# Patient Record
Sex: MALE | Race: ASIAN
[De-identification: names, ages, dates, MRNs, and addresses within clinical notes are randomized per-mention and may not be internally consistent; named-entity substitution may affect disease eponyms.]

---

## 2018-07-21 ENCOUNTER — HOSPITAL ENCOUNTER (INPATIENT)
Dept: HOSPITAL 36 - ER | Age: 68
LOS: 8 days | Discharge: NURSING, REHAB OR LONG-TERM CARE | DRG: 377 | End: 2018-07-29
Payer: MEDICARE

## 2018-07-21 DIAGNOSIS — N40.0: ICD-10-CM

## 2018-07-21 DIAGNOSIS — Z87.891: ICD-10-CM

## 2018-07-21 DIAGNOSIS — I25.2: ICD-10-CM

## 2018-07-21 DIAGNOSIS — J44.0: ICD-10-CM

## 2018-07-21 DIAGNOSIS — K92.2: Primary | ICD-10-CM

## 2018-07-21 DIAGNOSIS — J18.9: ICD-10-CM

## 2018-07-21 DIAGNOSIS — Z91.14: ICD-10-CM

## 2018-07-21 DIAGNOSIS — G89.4: ICD-10-CM

## 2018-07-21 DIAGNOSIS — F25.0: ICD-10-CM

## 2018-07-21 DIAGNOSIS — I25.10: ICD-10-CM

## 2018-07-21 DIAGNOSIS — F23: ICD-10-CM

## 2018-07-21 DIAGNOSIS — F03.90: ICD-10-CM

## 2018-07-21 DIAGNOSIS — G93.41: ICD-10-CM

## 2018-07-21 DIAGNOSIS — I48.91: ICD-10-CM

## 2018-07-21 DIAGNOSIS — N39.0: ICD-10-CM

## 2018-07-21 DIAGNOSIS — Z86.73: ICD-10-CM

## 2018-07-21 LAB
ALBUMIN SERPL-MCNC: 5 GM/DL (ref 4.2–5.5)
ALBUMIN/GLOB SERPL: 1.3 {RATIO} (ref 1–1.8)
ALP SERPL-CCNC: 109 U/L (ref 34–104)
ALT SERPL-CCNC: 20 U/L (ref 7–52)
AMYLASE SERPL-CCNC: 63 U/L (ref 29–103)
ANION GAP SERPL CALC-SCNC: 15.3 MMOL/L (ref 7–16)
AST SERPL-CCNC: 27 U/L (ref 13–39)
BASOPHILS # BLD AUTO: 0.1 TH/CUMM (ref 0–0.2)
BASOPHILS NFR BLD AUTO: 0.9 % (ref 0–2)
BILIRUB SERPL-MCNC: 0.5 MG/DL (ref 0.3–1)
BUN SERPL-MCNC: 19 MG/DL (ref 7–25)
CALCIUM SERPL-MCNC: 10.8 MG/DL (ref 8.6–10.3)
CHLORIDE SERPL-SCNC: 92 MEQ/L (ref 98–107)
CO2 SERPL-SCNC: 31.2 MEQ/L (ref 21–31)
CREAT SERPL-MCNC: 0.9 MG/DL (ref 0.7–1.3)
EOSINOPHIL # BLD AUTO: 0 TH/CMM (ref 0.1–0.4)
EOSINOPHIL NFR BLD AUTO: 0.4 % (ref 0–5)
ERYTHROCYTE [DISTWIDTH] IN BLOOD BY AUTOMATED COUNT: 14.9 % (ref 11.5–20)
GLOBULIN SER-MCNC: 3.8 GM/DL
GLUCOSE SERPL-MCNC: 119 MG/DL (ref 70–105)
HCT VFR BLD CALC: 44.8 % (ref 41–60)
HGB BLD-MCNC: 15.2 GM/DL (ref 12–16)
LIPASE SERPL-CCNC: 42 U/L (ref 11–82)
LYMPHOCYTE AB SER FC-ACNC: 1.3 TH/CMM (ref 1.5–3)
LYMPHOCYTES NFR BLD AUTO: 11.9 % (ref 20–50)
MAGNESIUM SERPL-MCNC: 2.4 MG/DL (ref 1.9–2.7)
MCH RBC QN AUTO: 31.3 PG (ref 27–31)
MCHC RBC AUTO-ENTMCNC: 33.8 PG (ref 28–36)
MCV RBC AUTO: 92.7 FL (ref 80–99)
MONOCYTES # BLD AUTO: 0.9 TH/CMM (ref 0.3–1)
MONOCYTES NFR BLD AUTO: 8.5 % (ref 2–10)
NEUTROPHILS # BLD: 8.7 TH/CMM (ref 1.8–8)
NEUTROPHILS NFR BLD AUTO: 78.3 % (ref 40–80)
PHOSPHATE SERPL-MCNC: 3.8 MG/DL (ref 2.5–5)
PLATELET # BLD: 315 TH/CMM (ref 150–400)
PMV BLD AUTO: 7 FL
POTASSIUM SERPL-SCNC: 3.5 MEQ/L (ref 3.5–5.1)
RBC # BLD AUTO: 4.84 MIL/CMM (ref 3.8–5.8)
SODIUM SERPL-SCNC: 135 MEQ/L (ref 136–145)
WBC # BLD AUTO: 11 TH/CMM (ref 4.8–10.8)

## 2018-07-21 PROCEDURE — Z7610: HCPCS

## 2018-07-21 PROCEDURE — C9113 INJ PANTOPRAZOLE SODIUM, VIA: HCPCS

## 2018-07-21 RX ADMIN — POTASSIUM CHLORIDE SCH MLS/HR: 2 INJECTION, SOLUTION, CONCENTRATE INTRAVENOUS at 09:50

## 2018-07-21 RX ADMIN — DILTIAZEM HYDROCHLORIDE SCH MG: 30 TABLET, FILM COATED ORAL at 09:51

## 2018-07-21 RX ADMIN — DILTIAZEM HYDROCHLORIDE SCH: 30 TABLET, FILM COATED ORAL at 10:55

## 2018-07-21 RX ADMIN — POTASSIUM CHLORIDE SCH MLS/HR: 2 INJECTION, SOLUTION, CONCENTRATE INTRAVENOUS at 23:33

## 2018-07-21 NOTE — ED PHYSICIAN CHART
ED Chief Complaint/HPI





- Patient Information


Date Seen:: 07/21/18


Time Seen:: 02:20


Chief Complaint:: coffee ground emesis


History of Present Illness:: 





coffee ground emesis


Allergies:: 


 Allergies











Allergy/AdvReac Type Severity Reaction Status Date / Time


 


No Known Allergies Allergy   Verified 07/21/18 02:32











Vitals:: 


 Vital Signs - 8 hr











  07/21/18





  02:20


 


Temp 98.4 F


 


HR 88


 


RR 20


 


/76


 


O2 Sat % 95














Family Medical History





- Family Member


  ** Mother


History Unknown: Yes





ED Physical Exam





- Physical Examination


General/Constitutional: Awake


Other Gen/Cons comments:: 


agitated, fighting and swearing.


Head: Atraumatic


Eyes: Lids, conjuctiva normal, PERRL, EOMI


Skin: Nl inspection, No rash, No skin lesions, No ecchymosis, Well hydrated, No 

lymphadenopathy


ENMT: External ears, nose nl, Nasal exam nl, Lips, teeth, gums nl


Neck: Nontender, Full ROM w/o pain, No JVD, No nuchal rigidity, No bruit, No 

mass, No stridor


Respiratory: Nl effort/Exclusion, Clear to Auscultation, No Wheeze/Rhonchi/Rales


Cardio Vascular: No murmur, gallop, rubs, NL S1 S2


Other Cardio Vascular comments:: 


tachycardic


GI: No tenderness/rebounding/guarding, No organomegaly, No hernia, Normal BS's, 

Nondistended, No mass/bruits, No McBurney tenderness


Other GI comments:: 


midline laparotomy scar.


: No CVA tenderness


Extremities: No tenderness or effusion, Full ROM, normal strength in all 

extremities, No edema, Normal digits & nails


Other Extremities comments:: 


right hip scar


Neuro/Psych: Normal motor strength


Other Neuro/Psych comments:: 


agitated, swearing and fighting.


Misc: Normal back, No paraspinal tenderness





ED Labs/Radiology/EKG Results





- Lab Results


Results: 


 Laboratory Tests











  07/21/18 07/21/18 07/21/18





  03:30 03:30 03:30


 


WBC  11.0 H  


 


RBC  4.84  


 


Hgb  15.2  


 


Hct  44.8  


 


MCV  92.7  


 


MCH  31.3 H  


 


MCHC Differential  33.8  


 


RDW  14.9  


 


Plt Count  315  


 


MPV  7.0  


 


Neutrophils %  78.3  


 


Lymphocytes %  11.9 L  


 


Monocytes %  8.5  


 


Eosinophils %  0.4  


 


Basophils %  0.9  


 


Sodium   135 L 


 


Potassium   3.5 


 


Chloride   92 L 


 


Carbon Dioxide   31.2 H 


 


Anion Gap   15.3 


 


BUN   19 


 


Creatinine   0.9 


 


Est GFR ( Amer)   > 60.0 


 


Est GFR (Non-Af Amer)   > 60.0 


 


BUN/Creatinine Ratio   21.1 


 


Glucose   119 H 


 


Calcium   10.8 H 


 


Phosphorus   3.8 


 


Magnesium   2.4 


 


Total Bilirubin   0.5 


 


AST   27 


 


ALT   20 


 


Alkaline Phosphatase   109 H 


 


Troponin I   


 


Total Protein   8.8 H 


 


Albumin   5.0 


 


Globulin   3.8 


 


Albumin/Globulin Ratio   1.3 


 


Amylase   63 


 


Lipase   42 


 


Blood Type    O POSITIVE


 


Antibody Screen    NEGATIVE














  07/21/18





  03:30


 


WBC 


 


RBC 


 


Hgb 


 


Hct 


 


MCV 


 


MCH 


 


MCHC Differential 


 


RDW 


 


Plt Count 


 


MPV 


 


Neutrophils % 


 


Lymphocytes % 


 


Monocytes % 


 


Eosinophils % 


 


Basophils % 


 


Sodium 


 


Potassium 


 


Chloride 


 


Carbon Dioxide 


 


Anion Gap 


 


BUN 


 


Creatinine 


 


Est GFR ( Amer) 


 


Est GFR (Non-Af Amer) 


 


BUN/Creatinine Ratio 


 


Glucose 


 


Calcium 


 


Phosphorus 


 


Magnesium 


 


Total Bilirubin 


 


AST 


 


ALT 


 


Alkaline Phosphatase 


 


Troponin I  < 0.01 L


 


Total Protein 


 


Albumin 


 


Globulin 


 


Albumin/Globulin Ratio 


 


Amylase 


 


Lipase 


 


Blood Type 


 


Antibody Screen 














ED Assessment





- Assessment


General Assessment: 


patient had to be given a shot of benadryl, haldol and ativan just to calm him 

down enough so that he would not hit personnel.  He also was placed in soft 

restraints which he broke off of his right wrist and then a leather restraint 

had to be placed on that side instead.


Assessment/Comments:: 


spoke to Dr. Limon about her patient at 11:45 p.m. who ordered the following 

for admit:  telemetry bed, hematocrit check every 6 hours, NPO, Protonix bolus 

and drip and GI consult.





EKG from 4:03:12 a.m. reveals normal sinus rhythm with a rate of 89, flipped t 

wave in III and nonspecific ST T wave changes.  











ED Septic Shock





- .


Is Septic Shock (SBP<90, OR Lactate>4 mmol\L) present?: No





- <6hrs of presentation:


Vital Signs: 


 Vital Signs - 8 hr











  07/21/18





  02:20


 


Temp 98.4 F


 


HR 88


 


RR 20


 


/76


 


O2 Sat % 95














ED Reassessment (Disposition)





- Reassessment


Reassessment Condition:: Improved





- Diagnosis


Diagnosis:: 


Upper GI bleed





Schizophrenia





H/o atrial fibrillation

## 2018-07-21 NOTE — CONSULTATION
DATE OF CONSULTATION:  07/21/2018



INPATIENT GASTROINTESTINAL CONSULTATION



CONSULTING PHYSICIAN:  Dr. Limon.



REASON FOR CONSULTATION:  Reported coffee-ground emesis.



HISTORY OF PRESENT ILLNESS:  The patient is a 68-year-old male with past medical

history significant for schizophrenia, atrial fibrillation, who was brought into

the hospital from his nursing facility after he was seen to have coffee-ground

emesis.  Of note, the patient is a poor historian and is unwilling to

participate in the interview other than saying that he feels fine and he denies

any vomiting.  Most of the history is obtained from the chart and the nursing

staff.  Apparently, the patient was seen to have coffee-ground emesis at his

other facility, although nothing has been observed here.  He was admitted to the

ER, given this history and was started on a Protonix drip and q.6 CBCs.  The

patient has been stable after admission and has not had any further emesis.  He

denies feeling sick and suddenly reports that he does not want to have any

procedures done on him.



PAST MEDICAL HISTORY:  Schizophrenia, atrial fibrillation.



PAST SURGICAL HISTORY:  Unknown.



FAMILY HISTORY:  Noncontributory.



SOCIAL HISTORY:  Unknown.



ALLERGIES:  No known drug allergies.



REVIEW OF SYSTEMS:  Not possible given the patient does not participate in the

interview process.



CURRENT MEDICATIONS:  Include amiodarone, cyanocobalamin, Cardizem, Depakote,

Colace, iron, gabapentin, Remeron, multivitamin, olanzapine, oxybutynin,

Protonix, tamsulosin.



PHYSICAL EXAMINATION:

VITAL SIGNS:  Blood pressure is 131/71, pulse 74 beats per minute, temperature

97.8, oxygenation 97%.

GENERAL:  The patient is lying on his side in bed, alert and oriented x 2.  He

is grumpy.

HEAD, EARS, EYES, NOSE AND THROAT:  Normocephalic, atraumatic appearing head. 

Pupils are equal and reactive.  Moist mucous membranes.

NECK:  Supple.  No obvious JVD or thyromegaly.

CHEST:  Clear to auscultation bilaterally.

CARDIOVASCULAR:  S1, S2 present.  Regular rate and rhythm.

ABDOMEN:  Soft, nontender to palpation.  No obvious guarding, rebound or fluid

distention.

EXTREMITIES:  No pitting edema.  Pulses are not present.

SKIN:  There is no obvious jaundice.



LABORATORIES:  White blood cell count is 11.0, hemoglobin 15.2, platelet count

is 315.  Sodium 135, BUN is 19, creatinine 0.9, AST 27, ALT 20, total bilirubin

0.5.  Troponin is negative.  Lipase 42.  No abdominal imaging has been

performed.



IMPRESSION:  This is a 68-year-old male with schizophrenia, who resides at the

nursing facility with atrial fibrillation, who comes into the hospital after

reported coffee-ground emesis.

1.  Coffee-ground emesis.

2.  Schizophrenia.

3.  Atrial fibrillation.



DISCUSSION:  Unclear if the patient actually had coffee-ground emesis, however,

this is reported.  He has not had any further episodes here.  His hemoglobin is

stable.  I doubt he is having an active GI bleed.  Nevertheless, we can

investigate the coffee ground emesis episode with upper endoscopy, although we

would have to obtain consent from this patient's conservator or family member

that is making decisions for him.  This would be on Monday if we _____ consent. 

This is not an emergency procedure.  Thus, we do not need to proceed urgently.



RECOMMENDATIONS:

1.  Start clear liquid diet and this can be advanced as tolerated.

2.  If we are able to obtain consent for EGD, we can plan for this Monday

morning, although this is not urgent.

3.  We will reduce the CBCs to every 12 hours.

4.  Change the Protonix drip to twice daily dosing Protonix as this is unlikely

to be a significant GI bleed.  We will continue to follow the patient.



Thank you for allowing us to participate in his care.  Please call if any

further questions.





DD: 07/21/2018 09:43

DT: 07/21/2018 10:10

[facility]# [id number]  [id number]

## 2018-07-21 NOTE — DIAGNOSTIC IMAGING REPORT
Exam: Portable chest x-ray



HISTORY: Pneumonia.



Findings:



Portable upright examination of the chest at 0815 hours reviewed, no

prior studies available comparison.



The study demonstrates mild bilateral interstitial infiltrates.  The

costophrenic angles are clear bony thorax is intact.  Mediastinal

structures midline the aortic arch calcified.



IMPRESSION:



Mild bilateral interstitial infiltrates.  Follow-up examination

recommended.

## 2018-07-21 NOTE — HISTORY & PHYSICAL
ADMIT DATE:  07/21/2018



CHIEF COMPLAINT:  Coffee-ground emesis by nursing staff.



HISTORY OF PRESENT ILLNESS:  The patient is a confused, noncompliant 68-year-old

male admitted from Emergency Room to telemetry floor of [facility] due to acute upper GI bleeding with ____ by nursing staff during

[facility] with coffee-ground emesis.  The patient was also noted to

have atrial fibrillation, rate is controlled.  The patient is more confused than

his baseline status, he is kind of noncompliant but education provided.  Vital

signs are basically stable.  Labs revealed WBC 11,000.  Sodium 135, BUN 19,

creatinine 0.9.  Troponin less than 0.01.  BNP was not done.  Chest x-ray

ordered earlier revealed bilateral interstitial infiltrates.



PAST MEDICAL HISTORY:  COPD, pneumonia, atrial fibrillation, coronary heart

disease, status post MI, urinary tract infection, BPH, difficulty walking due to

chronic pain syndrome.



PAST SURGICAL HISTORY:  Denies significant past surgical history.



MEDICATIONS:  See medication reconciliation list.



ALLERGIES:  No known drug allergies.



FAMILY HISTORY:  Noncontributory.



SOCIAL HISTORY:  The patient smoked before, quit years ago.  No history of

alcohol or IV drug use.



REVIEW OF SYSTEMS:  As per HPI.



PHYSICAL EXAMINATION:

GENERAL:  Well-developed, thin/cachectic male in no acute distress.

SKIN:  Warm and dry.

VITAL SIGNS:  Basically stable.

HEENT:  Normocephalic, atraumatic.  Pupils equal, round, react to light and

accommodation.

CHEST:  Symmetrical.

LUNGS:  Few rhonchi appreciated, bilaterally mild wheezing.

CARDIAC:  Atrial fibrillation, on off.

ABDOMEN:  Benign, soft, nontender.

EXTREMITIES:  No clubbing, cyanosis, edema bilaterally, 2+.

CRANIOLOGICAL:  Unremarkable.



LABORATORY TESTING:  Reviewed as seen from the computer as mentioned above.



ASSESSMENT AND PLAN:

1.  Acute upper gastrointestinal bleeding:  N.p.o.  GI consultation grossly

appreciated.  We will follow recommendation.

2.  Altered level of consciousness due to metabolic encephalopathy and dementia.

 We will observe closely.  The patient is on telemetry floor.

3.  Atrial fibrillation on and off, ____ rate controlled.

4.  Psychosis:  Continue medication, adjust accordingly.

5.  Mild early bilateral pneumonia:  Blood culture, sputum culture ordered,

empiric antibiotics started, which will be adjusted accordingly.

6.  Noncompliance:  Education provided.

7.  ____ urinary tract infection and BPH:  Urine culture ordered.  Blood culture

also ordered.  I will order PSA as well.

8.  Difficulty walking:  Multifactorial probably partially due to prior

cerebrovascular accident.

9.  DVT prophylaxis.





DD: 07/21/2018 13:12

DT: 07/21/2018 16:16

JOB# [id number]  [id number]

## 2018-07-22 LAB
APPEARANCE UR: CLEAR
BACTERIA #/AREA URNS HPF: (no result) /HPF
BILIRUB UR-MCNC: NEGATIVE MG/DL
COLOR UR: (no result)
EPI CELLS URNS QL MICRO: (no result) /LPF
GLUCOSE UR STRIP-MCNC: NEGATIVE MG/DL
KETONES UR STRIP-MCNC: (no result) MG/DL
LEUKOCYTE ESTERASE UR-ACNC: NEGATIVE
MICRO URNS: YES
NITRITE UR QL STRIP: NEGATIVE
PH UR STRIP: 5.5 [PH] (ref 4.6–8)
PROT UR STRIP-MCNC: (no result) MG/DL
RBC # UR STRIP: NEGATIVE /UL
RBC #/AREA URNS HPF: (no result) /HPF (ref 0–5)
SP GR UR STRIP: 1.02 (ref 1–1.03)
URINALYSIS COMPLETE PNL UR: (no result)
UROBILINOGEN UR STRIP-ACNC: 0.2 E.U./DL (ref 0.2–1)
WBC #/AREA URNS HPF: (no result) /HPF (ref 0–5)

## 2018-07-22 RX ADMIN — DILTIAZEM HYDROCHLORIDE SCH MG: 30 TABLET, FILM COATED ORAL at 08:57

## 2018-07-22 RX ADMIN — POTASSIUM CHLORIDE SCH MLS/HR: 2 INJECTION, SOLUTION, CONCENTRATE INTRAVENOUS at 13:05

## 2018-07-22 NOTE — INTERNAL MEDICINE PROG NOTE
Internal Medicine Subjective





- Subjective


Service Date: 07/22/18


Patient seen and examined:: without staff


Patient is:: awake, verbal, interactive, in bed, confused


Patient Complaints of:: congestion


Per staff patient has:: no adverse event





Internal Medicine Objective





- Results


Result Diagrams: 


 07/21/18 03:30





 07/21/18 03:30


Recent Labs: 


 Laboratory Last Values











WBC  11.0 Th/cmm (4.8-10.8)  H  07/21/18  03:30    


 


RBC  4.84 Mil/cmm (3.80-5.80)   07/21/18  03:30    


 


Hgb  15.2 gm/dL (12-16)   07/21/18  03:30    


 


Hct  44.8 % (41.0-60)   07/21/18  03:30    


 


MCV  92.7 fl (80-99)   07/21/18  03:30    


 


MCH  31.3 pg (27.0-31.0)  H  07/21/18  03:30    


 


MCHC Differential  33.8 pg (28.0-36.0)   07/21/18  03:30    


 


RDW  14.9 % (11.5-20.0)   07/21/18  03:30    


 


Plt Count  315 Th/cmm (150-400)   07/21/18  03:30    


 


MPV  7.0 fl  07/21/18  03:30    


 


Neutrophils %  78.3 % (40.0-80.0)   07/21/18  03:30    


 


Lymphocytes %  11.9 % (20.0-50.0)  L  07/21/18  03:30    


 


Monocytes %  8.5 % (2.0-10.0)   07/21/18  03:30    


 


Eosinophils %  0.4 % (0.0-5.0)   07/21/18  03:30    


 


Basophils %  0.9 % (0.0-2.0)   07/21/18  03:30    


 


Sodium  135 mEq/L (136-145)  L  07/21/18  03:30    


 


Potassium  3.5 mEq/L (3.5-5.1)   07/21/18  03:30    


 


Chloride  92 mEq/L ()  L  07/21/18  03:30    


 


Carbon Dioxide  31.2 mEq/L (21.0-31.0)  H  07/21/18  03:30    


 


Anion Gap  15.3  (7.0-16.0)   07/21/18  03:30    


 


BUN  19 mg/dL (7-25)   07/21/18  03:30    


 


Creatinine  0.9 mg/dL (0.7-1.3)   07/21/18  03:30    


 


Est GFR ( Amer)  > 60.0 ml/min (>90)   07/21/18  03:30    


 


Est GFR (Non-Af Amer)  > 60.0 ml/min  07/21/18  03:30    


 


BUN/Creatinine Ratio  21.1   07/21/18  03:30    


 


Glucose  119 mg/dL ()  H  07/21/18  03:30    


 


Calcium  10.8 mg/dL (8.6-10.3)  H  07/21/18  03:30    


 


Phosphorus  3.8 mg/dL (2.5-5.0)   07/21/18  03:30    


 


Magnesium  2.4 mg/dL (1.9-2.7)   07/21/18  03:30    


 


Total Bilirubin  0.5 mg/dL (0.3-1.0)   07/21/18  03:30    


 


AST  27 U/L (13-39)   07/21/18  03:30    


 


ALT  20 U/L (7-52)   07/21/18  03:30    


 


Alkaline Phosphatase  109 U/L ()  H  07/21/18  03:30    


 


Ammonia  53 umol/L (16-53)   07/22/18  05:06    


 


Troponin I  < 0.01 ng/mL (0.01-0.05)  L  07/21/18  03:30    


 


B-Natriuretic Peptide  87.0 pg/mL (5.0-100.0)   07/22/18  05:06    


 


Total Protein  8.8 gm/dL (6.0-8.3)  H  07/21/18  03:30    


 


Albumin  5.0 gm/dL (4.2-5.5)   07/21/18  03:30    


 


Globulin  3.8 gm/dL  07/21/18  03:30    


 


Albumin/Globulin Ratio  1.3  (1.0-1.8)   07/21/18  03:30    


 


Amylase  63 U/L ()   07/21/18  03:30    


 


Lipase  42 U/L (11-82)   07/21/18  03:30    


 


Urine Source  CLEAN C   07/22/18  03:44    


 


Urine Color  BROWN   07/22/18  03:44    


 


Urine Clarity  CLEAR  (CLEAR)   07/22/18  03:44    


 


Urine pH  5.5  (4.6 - 8.0)   07/22/18  03:44    


 


Ur Specific Gravity  1.025  (1.005-1.030)   07/22/18  03:44    


 


Urine Protein  TRACE mg/dL (NEGATIVE)   07/22/18  03:44    


 


Urine Glucose (UA)  NEGATIVE mg/dL (NEGATIVE)   07/22/18  03:44    


 


Urine Ketones  TRACE mg/dL (NEGATIVE)   07/22/18  03:44    


 


Urine Blood  NEGATIVE  (NEGATIVE)   07/22/18  03:44    


 


Urine Nitrate  NEGATIVE  (NEGATIVE)   07/22/18  03:44    


 


Urine Bilirubin  NEGATIVE  (NEGATIVE)   07/22/18  03:44    


 


Urine Urobilinogen  0.2 E.U./dL (0.2 - 1.0)   07/22/18  03:44    


 


Ur Leukocyte Esterase  NEGATIVE  (NEGATIVE)   07/22/18  03:44    


 


Urine RBC  NONE SEEN /hpf (0-5)   07/22/18  03:44    


 


Urine WBC  NONE SEEN /hpf (0-5)   07/22/18  03:44    


 


Ur Epithelial Cells  NONE SEEN /lpf (FEW)   07/22/18  03:44    


 


Urine Bacteria  NONE SEEN /hpf (NONE SEEN)   07/22/18  03:44    


 


Blood Type  O POSITIVE   07/21/18  03:30    


 


Antibody Screen  NEGATIVE   07/21/18  03:30    














- Physical Exam


Vitals and I&O: 


 Vital Signs











Temp  96.4 F   07/22/18 16:00


 


Pulse  67   07/22/18 16:00


 


Resp  21   07/22/18 19:00


 


BP  103/57   07/22/18 16:00


 


Pulse Ox  98   07/22/18 16:00








 Intake & Output











 07/22/18 07/22/18 07/23/18





 06:59 18:59 06:59


 


Intake Total 1055 1805 50


 


Output Total  1 


 


Balance 1055 1804 50


 


Weight (lbs)  67.585 kg 


 


Intake:   


 


  Intake, IV Amount 1055 1105 50


 


    Piperacillin Sodium/ 50 100 50





    Tazobact 3.375 gm In   





    Sodium Chloride 0.9% 50   





    ml @ 100 mls/hr IV Q8H   





    [facility] Rx#:[id number]   


 


    Potassium Chloride 10 meq 1005 1005 





    In D5-0.9%Ns 1,000 ml @   





    75 mls/hr IV .[id number] [facility]   





    Rx#:[id number]   


 


  Oral  700 


 


Output:   


 


  Emesis  1 


 


Other:   


 


  # Voids  3 


 


  Weight Source  Bedscale 











Active Medications: 


Current Medications





Albuterol/Ipratropium (Duoneb Neb)  3 ml HHN Q2H PRN


   PRN Reason: Wheezing


   Stop: 09/19/18 14:09


Amiodarone HCl (Cordarone)  200 mg PO DAILY [facility]


   Stop: 09/19/18 08:59


   Last Admin: 07/22/18 08:57 Dose:  200 mg


Cyanocobalamin (Vitamin B12)  1,000 mcg PO DAILY [facility]


   Stop: 09/19/18 08:59


   Last Admin: 07/22/18 08:57 Dose:  1,000 mcg


Diltiazem HCl (Cardizem)  60 mg PO DAILY [facility]


   Stop: 09/19/18 08:59


   Last Admin: 07/22/18 08:57 Dose:  60 mg


Divalproex Sodium (Depakote Dr)  500 mg PO BID [facility]; Protocol


   Stop: 09/19/18 08:59


   Last Admin: 07/22/18 16:56 Dose:  500 mg


Docusate Sodium (Colace)  100 mg PO BID PRN


   PRN Reason: CONSTIPATION


   Stop: 09/19/18 08:47


Ferrous Sulfate (Iron)  325 mg PO DAILY [facility]


   Stop: 09/19/18 08:59


   Last Admin: 07/22/18 08:58 Dose:  325 mg


Gabapentin (Neurontin)  300 mg PO TID [facility]


   Stop: 09/19/18 08:59


   Last Admin: 07/22/18 20:35 Dose:  300 mg


Potassium Chloride 10 meq/ (Dextrose/Sodium Chloride)  1,005 mls @ 75 mls/hr IV 

.[id number] [facility]


   Stop: 09/19/18 08:44


   Last Admin: 07/22/18 13:05 Dose:  75 mls/hr


Piperacillin Sod/Tazobactam (Sod 3.375 gm/ Sodium Chloride)  50 mls @ 100 mls/

hr IV Q8H [facility]


   Stop: 09/19/18 13:14


   Last Infusion: 07/22/18 21:10 Dose:  Infused


Lorazepam (Ativan)  1 mg IVP Q6HR PRN; Protocol


   PRN Reason: Agitation


   Stop: 09/19/18 12:32


Mirtazapine (Remeron)  15 mg PO HS [facility]; Protocol


   Stop: 09/19/18 20:59


   Last Admin: 07/22/18 20:35 Dose:  15 mg


Multivitamins/Vitamin C (Theragran)  1 tab PO DAILY [facility]


   Stop: 09/19/18 08:59


   Last Admin: 07/22/18 08:57 Dose:  1 tab


Olanzapine (Zyprexa)  10 mg PO HS [facility]


   Stop: 09/19/18 20:59


   Last Admin: 07/22/18 20:35 Dose:  10 mg


Ondansetron HCl (Zofran)  4 mg IV Q6H PRN


   PRN Reason: Nausea / Vomiting


   Stop: 09/20/18 19:24


Oxybutynin Chloride (Ditropan)  5 mg PO HS [facility]


   Stop: 09/19/18 20:59


   Last Admin: 07/22/18 20:35 Dose:  5 mg


Pantoprazole Sodium (Protonix)  40 mg IVP BID [facility]


   Stop: 09/19/18 16:59


   Last Admin: 07/22/18 16:56 Dose:  40 mg


Sodium Chloride (Nacl Tab)  1 gm PO BID [facility]


   Stop: 09/19/18 08:59


   Last Admin: 07/22/18 16:56 Dose:  1 gm


Tamsulosin HCl (Flomax)  0.4 mg PO DAILY [facility]


   Stop: 09/19/18 08:59


   Last Admin: 07/22/18 08:57 Dose:  0.4 mg








General: weak, lethargic, congested


HEENT: NC/AT, PERRLA, EOMI, anicteric sclerae, throat clear


Neck: Supple, No JVD, No thyromegaly


Lungs: wheezing, ronchi


Cardiovascular: RRR, Normal S1


Abdomen: soft, non-tender, non-distended, positive bowel sound


Extremities: clear


Neurological: no change





Internal Medicine Assmt/Plan





- Assessment


Assessment: 


Acute UGIB in SNF: observe; GI consultation.





ALOC: multifactorial.





BL early PNA: IVPB ABX.





h/o A. Fib: rate controlled.





Noncompliance: education provided.





Psychosis: continue meds.





DVT prophylaxis.

## 2018-07-22 NOTE — GI PROGRESS NOTE
Subjective





- Review of Systems


Service Date: 07/22/18


Subjective: 


RN reports one episode of dark green emesis today





Objective





- Results


Result Diagrams: 


 07/21/18 03:30





 07/21/18 03:30


Recent Labs: 


 Laboratory Last Values











WBC  11.0 Th/cmm (4.8-10.8)  H  07/21/18  03:30    


 


RBC  4.84 Mil/cmm (3.80-5.80)   07/21/18  03:30    


 


Hgb  15.2 gm/dL (12-16)   07/21/18  03:30    


 


Hct  44.8 % (41.0-60)   07/21/18  03:30    


 


MCV  92.7 fl (80-99)   07/21/18  03:30    


 


MCH  31.3 pg (27.0-31.0)  H  07/21/18  03:30    


 


MCHC Differential  33.8 pg (28.0-36.0)   07/21/18  03:30    


 


RDW  14.9 % (11.5-20.0)   07/21/18  03:30    


 


Plt Count  315 Th/cmm (150-400)   07/21/18  03:30    


 


MPV  7.0 fl  07/21/18  03:30    


 


Neutrophils %  78.3 % (40.0-80.0)   07/21/18  03:30    


 


Lymphocytes %  11.9 % (20.0-50.0)  L  07/21/18  03:30    


 


Monocytes %  8.5 % (2.0-10.0)   07/21/18  03:30    


 


Eosinophils %  0.4 % (0.0-5.0)   07/21/18  03:30    


 


Basophils %  0.9 % (0.0-2.0)   07/21/18  03:30    


 


Sodium  135 mEq/L (136-145)  L  07/21/18  03:30    


 


Potassium  3.5 mEq/L (3.5-5.1)   07/21/18  03:30    


 


Chloride  92 mEq/L ()  L  07/21/18  03:30    


 


Carbon Dioxide  31.2 mEq/L (21.0-31.0)  H  07/21/18  03:30    


 


Anion Gap  15.3  (7.0-16.0)   07/21/18  03:30    


 


BUN  19 mg/dL (7-25)   07/21/18  03:30    


 


Creatinine  0.9 mg/dL (0.7-1.3)   07/21/18  03:30    


 


Est GFR ( Amer)  > 60.0 ml/min (>90)   07/21/18  03:30    


 


Est GFR (Non-Af Amer)  > 60.0 ml/min  07/21/18  03:30    


 


BUN/Creatinine Ratio  21.1   07/21/18  03:30    


 


Glucose  119 mg/dL ()  H  07/21/18  03:30    


 


Calcium  10.8 mg/dL (8.6-10.3)  H  07/21/18  03:30    


 


Phosphorus  3.8 mg/dL (2.5-5.0)   07/21/18  03:30    


 


Magnesium  2.4 mg/dL (1.9-2.7)   07/21/18  03:30    


 


Total Bilirubin  0.5 mg/dL (0.3-1.0)   07/21/18  03:30    


 


AST  27 U/L (13-39)   07/21/18  03:30    


 


ALT  20 U/L (7-52)   07/21/18  03:30    


 


Alkaline Phosphatase  109 U/L ()  H  07/21/18  03:30    


 


Ammonia  53 umol/L (16-53)   07/22/18  05:06    


 


Troponin I  < 0.01 ng/mL (0.01-0.05)  L  07/21/18  03:30    


 


B-Natriuretic Peptide  87.0 pg/mL (5.0-100.0)   07/22/18  05:06    


 


Total Protein  8.8 gm/dL (6.0-8.3)  H  07/21/18  03:30    


 


Albumin  5.0 gm/dL (4.2-5.5)   07/21/18  03:30    


 


Globulin  3.8 gm/dL  07/21/18  03:30    


 


Albumin/Globulin Ratio  1.3  (1.0-1.8)   07/21/18  03:30    


 


Amylase  63 U/L ()   07/21/18  03:30    


 


Lipase  42 U/L (11-82)   07/21/18  03:30    


 


Urine Source  CLEAN C   07/22/18  03:44    


 


Urine Color  BROWN   07/22/18  03:44    


 


Urine Clarity  CLEAR  (CLEAR)   07/22/18  03:44    


 


Urine pH  5.5  (4.6 - 8.0)   07/22/18  03:44    


 


Ur Specific Gravity  1.025  (1.005-1.030)   07/22/18  03:44    


 


Urine Protein  TRACE mg/dL (NEGATIVE)   07/22/18  03:44    


 


Urine Glucose (UA)  NEGATIVE mg/dL (NEGATIVE)   07/22/18  03:44    


 


Urine Ketones  TRACE mg/dL (NEGATIVE)   07/22/18  03:44    


 


Urine Blood  NEGATIVE  (NEGATIVE)   07/22/18  03:44    


 


Urine Nitrate  NEGATIVE  (NEGATIVE)   07/22/18  03:44    


 


Urine Bilirubin  NEGATIVE  (NEGATIVE)   07/22/18  03:44    


 


Urine Urobilinogen  0.2 E.U./dL (0.2 - 1.0)   07/22/18  03:44    


 


Ur Leukocyte Esterase  NEGATIVE  (NEGATIVE)   07/22/18  03:44    


 


Urine RBC  NONE SEEN /hpf (0-5)   07/22/18  03:44    


 


Urine WBC  NONE SEEN /hpf (0-5)   07/22/18  03:44    


 


Ur Epithelial Cells  NONE SEEN /lpf (FEW)   07/22/18  03:44    


 


Urine Bacteria  NONE SEEN /hpf (NONE SEEN)   07/22/18  03:44    


 


Blood Type  O POSITIVE   07/21/18  03:30    


 


Antibody Screen  NEGATIVE   07/21/18  03:30    














- Physical Exam


Vitals and I&O: 


 Vital Signs











Temp  97.7 F   07/21/18 16:00


 


Pulse  85   07/22/18 07:51


 


Resp  14   07/22/18 07:51


 


BP  115/67   07/21/18 16:00


 


Pulse Ox  93   07/22/18 07:51








 Intake & Output











 07/21/18 07/22/18 07/22/18





 18:59 06:59 18:59


 


Intake Total 894.474 1925 


 


Balance 224.418 3188 


 


Weight (lbs) 67.585 kg  


 


Intake:   


 


  Intake, IV Amount 155.708 4309 


 


    Pantoprazole 80 mg In 69.333  





    Sodium Chloride 0.9% 100   





    ml @ 10 mls/hr IV Q10H   





    [facility] Rx#:[id number]   


 


    Piperacillin Sodium/ 50 50 





    Tazobact 3.375 gm In   





    Sodium Chloride 0.9% 50   





    ml @ 100 mls/hr IV Q8H   





    [facility] Rx#:[id number]   


 


    Potassium Chloride 10 meq  1005 





    In D5-0.9%Ns 1,000 ml @   





    75 mls/hr IV [id number] [facility]   





    Rx#:[id number]   


 


  Oral 200  


 


Other:   


 


  # Voids 1  


 


  Weight Source Bedscale  











Active Medications: 


Current Medications





Albuterol/Ipratropium (Duoneb Neb)  3 ml HHN Q2H PRN


   PRN Reason: Wheezing


   Stop: 09/19/18 14:09


Amiodarone HCl (Cordarone)  200 mg PO DAILY [facility]


   Stop: 09/19/18 08:59


   Last Admin: 07/21/18 10:55 Dose:  Not Given


Cyanocobalamin (Vitamin B12)  1,000 mcg PO DAILY [facility]


   Stop: 09/19/18 08:59


   Last Admin: 07/21/18 10:55 Dose:  Not Given


Diltiazem HCl (Cardizem)  60 mg PO DAILY [facility]


   Stop: 09/19/18 08:59


   Last Admin: 07/21/18 10:55 Dose:  Not Given


Divalproex Sodium (Depakote Dr)  500 mg PO BID [facility]; Protocol


   Stop: 09/19/18 08:59


   Last Admin: 07/21/18 17:21 Dose:  Not Given


Docusate Sodium (Colace)  100 mg PO BID PRN


   PRN Reason: CONSTIPATION


   Stop: 09/19/18 08:47


Ferrous Sulfate (Iron)  325 mg PO DAILY [facility]


   Stop: 09/19/18 08:59


   Last Admin: 07/21/18 10:56 Dose:  Not Given


Gabapentin (Neurontin)  300 mg PO TID [facility]


   Stop: 09/19/18 08:59


   Last Admin: 07/21/18 21:34 Dose:  Not Given


Potassium Chloride 10 meq/ (Dextrose/Sodium Chloride)  1,005 mls @ 75 mls/hr IV 

.[id number] [facility]


   Stop: 09/19/18 08:44


   Last Admin: 07/21/18 23:33 Dose:  75 mls/hr


Piperacillin Sod/Tazobactam (Sod 3.375 gm/ Sodium Chloride)  50 mls @ 100 mls/

hr IV Q8H [facility]


   Stop: 09/19/18 13:14


   Last Admin: 07/22/18 05:01 Dose:  100 mls/hr


Lorazepam (Ativan)  1 mg IVP Q6HR PRN; Protocol


   PRN Reason: Agitation


   Stop: 09/19/18 12:32


Mirtazapine (Remeron)  15 mg PO HS [facility]; Protocol


   Stop: 09/19/18 20:59


   Last Admin: 07/21/18 21:34 Dose:  Not Given


Multivitamins/Vitamin C (Theragran)  1 tab PO DAILY [facility]


   Stop: 09/19/18 08:59


   Last Admin: 07/21/18 10:56 Dose:  Not Given


Olanzapine (Zyprexa)  10 mg PO HS [facility]


   Stop: 09/19/18 20:59


   Last Admin: 07/21/18 21:34 Dose:  Not Given


Oxybutynin Chloride (Ditropan)  5 mg PO HS [facility]


   Stop: 09/19/18 20:59


   Last Admin: 07/21/18 21:35 Dose:  Not Given


Pantoprazole Sodium (Protonix)  40 mg IVP BID [facility]


   Stop: 09/19/18 16:59


   Last Admin: 07/21/18 17:14 Dose:  40 mg


Sodium Chloride (Nacl Tab)  1 gm PO BID [facility]


   Stop: 09/19/18 08:59


   Last Admin: 07/21/18 17:22 Dose:  Not Given


Tamsulosin HCl (Flomax)  0.4 mg PO DAILY [facility]


   Stop: 09/19/18 08:59


   Last Admin: 07/21/18 10:56 Dose:  Not Given








General: Alert


HEENT: Atraumatic


Neck: Supple


Cardiovascular: Regular rate


Abdomen: Bowel sounds, Soft, no Tender, no Hepatomegaly, no Splenomegaly, no 

Distended, no Rebound, no Mass, no Guarding





Assessment/Plan





- Problem List


Patient Problems: 


All Active Problems





Coffee ground emesis (Acute) K92.0











- Assessment


Assessment: 


# Coffee ground emesis


# Schizophrenia





Pt have have gastroenteritis, gastroparesis, esophagitis, or peptic ulcer 

disease as the cause of his vomiting. EGD indicated given the coffee ground 

color, but we will need to obtain consent from conservator.





Plan:





- EGD tomorrow if consent can be obtained\


- PPI q12


- diet as tolerated


- anti emetics

## 2018-07-22 NOTE — DIAGNOSTIC IMAGING REPORT
Exam: KUB the abdomen.



HISTORY: Constipation.



Findings:



Portable supine examination of the abdomen 0906 hours reviewed.  The

study demonstrates significant distention of the right colon with air.



There is evidence for distention of small bowel loops left lower

quadrant.  There is no evidence of significant fecal impaction.  Total

right hip prosthesis is noted.  No abnormal masses calcifications noted.



IMPRESSION significant distention of right colon with air consistent

with ileus.



No evidence for fecal impaction.

## 2018-07-23 RX ADMIN — POTASSIUM CHLORIDE SCH MLS/HR: 2 INJECTION, SOLUTION, CONCENTRATE INTRAVENOUS at 03:34

## 2018-07-23 RX ADMIN — POTASSIUM CHLORIDE SCH MLS/HR: 2 INJECTION, SOLUTION, CONCENTRATE INTRAVENOUS at 16:58

## 2018-07-23 RX ADMIN — DILTIAZEM HYDROCHLORIDE SCH MG: 30 TABLET, FILM COATED ORAL at 09:19

## 2018-07-23 NOTE — INTERNAL MEDICINE PROG NOTE
Internal Medicine Subjective





- Subjective


Service Date: 07/23/18


Patient seen and examined:: without staff


Patient is:: awake, verbal, interactive, in bed, confused


Patient Complaints of:: congestion


Per staff patient has:: no adverse event





Internal Medicine Objective





- Results


Result Diagrams: 


 07/21/18 03:30





 07/21/18 03:30


Recent Labs: 


 Laboratory Last Values











WBC  11.0 Th/cmm (4.8-10.8)  H  07/21/18  03:30    


 


RBC  4.84 Mil/cmm (3.80-5.80)   07/21/18  03:30    


 


Hgb  15.2 gm/dL (12-16)   07/21/18  03:30    


 


Hct  44.8 % (41.0-60)   07/21/18  03:30    


 


MCV  92.7 fl (80-99)   07/21/18  03:30    


 


MCH  31.3 pg (27.0-31.0)  H  07/21/18  03:30    


 


MCHC Differential  33.8 pg (28.0-36.0)   07/21/18  03:30    


 


RDW  14.9 % (11.5-20.0)   07/21/18  03:30    


 


Plt Count  315 Th/cmm (150-400)   07/21/18  03:30    


 


MPV  7.0 fl  07/21/18  03:30    


 


Neutrophils %  78.3 % (40.0-80.0)   07/21/18  03:30    


 


Lymphocytes %  11.9 % (20.0-50.0)  L  07/21/18  03:30    


 


Monocytes %  8.5 % (2.0-10.0)   07/21/18  03:30    


 


Eosinophils %  0.4 % (0.0-5.0)   07/21/18  03:30    


 


Basophils %  0.9 % (0.0-2.0)   07/21/18  03:30    


 


Sodium  135 mEq/L (136-145)  L  07/21/18  03:30    


 


Potassium  3.5 mEq/L (3.5-5.1)   07/21/18  03:30    


 


Chloride  92 mEq/L ()  L  07/21/18  03:30    


 


Carbon Dioxide  31.2 mEq/L (21.0-31.0)  H  07/21/18  03:30    


 


Anion Gap  15.3  (7.0-16.0)   07/21/18  03:30    


 


BUN  19 mg/dL (7-25)   07/21/18  03:30    


 


Creatinine  0.9 mg/dL (0.7-1.3)   07/21/18  03:30    


 


Est GFR ( Amer)  > 60.0 ml/min (>90)   07/21/18  03:30    


 


Est GFR (Non-Af Amer)  > 60.0 ml/min  07/21/18  03:30    


 


BUN/Creatinine Ratio  21.1   07/21/18  03:30    


 


Glucose  119 mg/dL ()  H  07/21/18  03:30    


 


Calcium  10.8 mg/dL (8.6-10.3)  H  07/21/18  03:30    


 


Phosphorus  3.8 mg/dL (2.5-5.0)   07/21/18  03:30    


 


Magnesium  2.4 mg/dL (1.9-2.7)   07/21/18  03:30    


 


Total Bilirubin  0.5 mg/dL (0.3-1.0)   07/21/18  03:30    


 


AST  27 U/L (13-39)   07/21/18  03:30    


 


ALT  20 U/L (7-52)   07/21/18  03:30    


 


Alkaline Phosphatase  109 U/L ()  H  07/21/18  03:30    


 


Ammonia  53 umol/L (16-53)   07/22/18  05:06    


 


Troponin I  < 0.01 ng/mL (0.01-0.05)  L  07/21/18  03:30    


 


B-Natriuretic Peptide  87.0 pg/mL (5.0-100.0)   07/22/18  05:06    


 


Total Protein  8.8 gm/dL (6.0-8.3)  H  07/21/18  03:30    


 


Albumin  5.0 gm/dL (4.2-5.5)   07/21/18  03:30    


 


Globulin  3.8 gm/dL  07/21/18  03:30    


 


Albumin/Globulin Ratio  1.3  (1.0-1.8)   07/21/18  03:30    


 


Amylase  63 U/L ()   07/21/18  03:30    


 


Lipase  42 U/L (11-82)   07/21/18  03:30    


 


Urine Source  CLEAN C   07/22/18  03:44    


 


Urine Color  BROWN   07/22/18  03:44    


 


Urine Clarity  CLEAR  (CLEAR)   07/22/18  03:44    


 


Urine pH  5.5  (4.6 - 8.0)   07/22/18  03:44    


 


Ur Specific Gravity  1.025  (1.005-1.030)   07/22/18  03:44    


 


Urine Protein  TRACE mg/dL (NEGATIVE)   07/22/18  03:44    


 


Urine Glucose (UA)  NEGATIVE mg/dL (NEGATIVE)   07/22/18  03:44    


 


Urine Ketones  TRACE mg/dL (NEGATIVE)   07/22/18  03:44    


 


Urine Blood  NEGATIVE  (NEGATIVE)   07/22/18  03:44    


 


Urine Nitrate  NEGATIVE  (NEGATIVE)   07/22/18  03:44    


 


Urine Bilirubin  NEGATIVE  (NEGATIVE)   07/22/18  03:44    


 


Urine Urobilinogen  0.2 E.U./dL (0.2 - 1.0)   07/22/18  03:44    


 


Ur Leukocyte Esterase  NEGATIVE  (NEGATIVE)   07/22/18  03:44    


 


Urine RBC  NONE SEEN /hpf (0-5)   07/22/18  03:44    


 


Urine WBC  NONE SEEN /hpf (0-5)   07/22/18  03:44    


 


Ur Epithelial Cells  NONE SEEN /lpf (FEW)   07/22/18  03:44    


 


Urine Bacteria  NONE SEEN /hpf (NONE SEEN)   07/22/18  03:44    


 


Blood Type  O POSITIVE   07/21/18  03:30    


 


Antibody Screen  NEGATIVE   07/21/18  03:30    














- Physical Exam


Vitals and I&O: 


 Vital Signs











Temp  97.4 F   07/23/18 20:00


 


Pulse  57   07/23/18 20:00


 


Resp  18   07/23/18 20:00


 


BP  107/63   07/23/18 20:00


 


Pulse Ox  97   07/23/18 20:00








 Intake & Output











 07/23/18 07/23/18 07/24/18





 06:59 18:59 06:59


 


Intake Total 1255 3355 


 


Balance 1255 3355 


 


Weight (lbs) 67.585 kg 66.678 kg 


 


Intake:   


 


  Intake, IV Amount 1105 1055 


 


    Piperacillin Sodium/ 100 50 





    Tazobact 3.375 gm In   





    Sodium Chloride 0.9% 50   





    ml @ 100 mls/hr IV Q8H   





    [facility] Rx#:[id number]   


 


    Potassium Chloride 10 meq 1005 1005 





    In D5-0.9%Ns 1,000 ml @   





    75 mls/hr IV [id number] [facility]   





    Rx#:[id number]   


 


  Oral 150 2300 


 


Other:   


 


  # Voids 3 5 


 


  # Bowel Movements 1 2 


 


  Stool Characteristics Soft Liquid 


 


  Weight Source Bedscale Bedscale 











Active Medications: 


Current Medications





Albuterol/Ipratropium (Duoneb Neb)  3 ml HHN Q2H PRN


   PRN Reason: Wheezing


   Stop: 09/19/18 14:09


Amiodarone HCl (Cordarone)  200 mg PO DAILY [facility]


   Stop: 09/19/18 08:59


   Last Admin: 07/23/18 09:20 Dose:  200 mg


Cyanocobalamin (Vitamin B12)  1,000 mcg PO DAILY [facility]


   Stop: 09/19/18 08:59


   Last Admin: 07/23/18 09:19 Dose:  1,000 mcg


Diltiazem HCl (Cardizem)  60 mg PO DAILY [facility]


   Stop: 09/19/18 08:59


   Last Admin: 07/23/18 09:19 Dose:  60 mg


Divalproex Sodium (Depakote Dr)  500 mg PO BID [facility]; Protocol


   Stop: 09/19/18 08:59


   Last Admin: 07/23/18 17:07 Dose:  500 mg


Docusate Sodium (Colace)  100 mg PO BID PRN


   PRN Reason: CONSTIPATION


   Stop: 09/19/18 08:47


Ferrous Sulfate (Iron)  325 mg PO DAILY [facility]


   Stop: 09/19/18 08:59


   Last Admin: 07/23/18 09:20 Dose:  325 mg


Gabapentin (Neurontin)  300 mg PO TID [facility]


   Stop: 09/19/18 08:59


   Last Admin: 07/23/18 21:27 Dose:  300 mg


Potassium Chloride 10 meq/ (Dextrose/Sodium Chloride)  1,005 mls @ 75 mls/hr IV 

.[id number] [facility]


   Stop: 09/19/18 08:44


   Last Admin: 07/23/18 16:58 Dose:  75 mls/hr


Piperacillin Sod/Tazobactam (Sod 3.375 gm/ Sodium Chloride)  50 mls @ 100 mls/

hr IV Q8H [facility]


   Stop: 09/19/18 13:14


   Last Admin: 07/23/18 21:27 Dose:  100 mls/hr


Lorazepam (Ativan)  1 mg IVP Q6HR PRN; Protocol


   PRN Reason: Agitation


   Stop: 09/19/18 12:32


   Last Admin: 07/23/18 09:31 Dose:  1 mg


Mirtazapine (Remeron)  15 mg PO HS [facility]; Protocol


   Stop: 09/19/18 20:59


   Last Admin: 07/23/18 21:27 Dose:  15 mg


Multivitamins/Vitamin C (Theragran)  1 tab PO DAILY [facility]


   Stop: 09/19/18 08:59


   Last Admin: 07/23/18 09:20 Dose:  1 tab


Olanzapine (Zyprexa)  10 mg PO HS [facility]


   Stop: 09/19/18 20:59


   Last Admin: 07/23/18 21:27 Dose:  10 mg


Ondansetron HCl (Zofran)  4 mg IV Q6H PRN


   PRN Reason: Nausea / Vomiting


   Stop: 09/20/18 19:24


Oxybutynin Chloride (Ditropan)  5 mg PO HS [facility]


   Stop: 09/19/18 20:59


   Last Admin: 07/23/18 21:27 Dose:  5 mg


Pantoprazole Sodium (Protonix)  40 mg IVP BID [facility]


   Stop: 09/19/18 16:59


   Last Admin: 07/23/18 17:17 Dose:  40 mg


Sodium Chloride (Nacl Tab)  1 gm PO BID [facility]


   Stop: 09/19/18 08:59


   Last Admin: 07/23/18 17:07 Dose:  1 gm


Tamsulosin HCl (Flomax)  0.4 mg PO DAILY [facility]


   Stop: 09/19/18 08:59


   Last Admin: 07/23/18 09:20 Dose:  0.4 mg








General: weak, lethargic, congested


HEENT: NC/AT, PERRLA, EOMI, anicteric sclerae, throat clear


Neck: Supple, No JVD, No thyromegaly


Lungs: wheezing, ronchi


Cardiovascular: RRR, Normal S1


Abdomen: soft, non-tender, non-distended, positive bowel sound


Extremities: clear


Neurological: no change





Internal Medicine Assmt/Plan





- Assessment


Assessment: 


BL early PNA: IVPB ABX.





Acute UGIB in [facility]: observe; GI consultation.





ALOC: multifactorial.





h/o A. Fib: rate controlled.





Noncompliance: education provided.





Psychosis: continue meds.





DVT prophylaxis.

## 2018-07-23 NOTE — GI PROGRESS NOTE
Subjective





- Review of Systems


Service Date: 07/23/18


Subjective: 


No further vomiting, pt remains combative 





Objective





- Results


Result Diagrams: 


 07/21/18 03:30





 07/21/18 03:30


Recent Labs: 


 Laboratory Last Values











WBC  11.0 Th/cmm (4.8-10.8)  H  07/21/18  03:30    


 


RBC  4.84 Mil/cmm (3.80-5.80)   07/21/18  03:30    


 


Hgb  15.2 gm/dL (12-16)   07/21/18  03:30    


 


Hct  44.8 % (41.0-60)   07/21/18  03:30    


 


MCV  92.7 fl (80-99)   07/21/18  03:30    


 


MCH  31.3 pg (27.0-31.0)  H  07/21/18  03:30    


 


MCHC Differential  33.8 pg (28.0-36.0)   07/21/18  03:30    


 


RDW  14.9 % (11.5-20.0)   07/21/18  03:30    


 


Plt Count  315 Th/cmm (150-400)   07/21/18  03:30    


 


MPV  7.0 fl  07/21/18  03:30    


 


Neutrophils %  78.3 % (40.0-80.0)   07/21/18  03:30    


 


Lymphocytes %  11.9 % (20.0-50.0)  L  07/21/18  03:30    


 


Monocytes %  8.5 % (2.0-10.0)   07/21/18  03:30    


 


Eosinophils %  0.4 % (0.0-5.0)   07/21/18  03:30    


 


Basophils %  0.9 % (0.0-2.0)   07/21/18  03:30    


 


Sodium  135 mEq/L (136-145)  L  07/21/18  03:30    


 


Potassium  3.5 mEq/L (3.5-5.1)   07/21/18  03:30    


 


Chloride  92 mEq/L ()  L  07/21/18  03:30    


 


Carbon Dioxide  31.2 mEq/L (21.0-31.0)  H  07/21/18  03:30    


 


Anion Gap  15.3  (7.0-16.0)   07/21/18  03:30    


 


BUN  19 mg/dL (7-25)   07/21/18  03:30    


 


Creatinine  0.9 mg/dL (0.7-1.3)   07/21/18  03:30    


 


Est GFR ( Amer)  > 60.0 ml/min (>90)   07/21/18  03:30    


 


Est GFR (Non-Af Amer)  > 60.0 ml/min  07/21/18  03:30    


 


BUN/Creatinine Ratio  21.1   07/21/18  03:30    


 


Glucose  119 mg/dL ()  H  07/21/18  03:30    


 


Calcium  10.8 mg/dL (8.6-10.3)  H  07/21/18  03:30    


 


Phosphorus  3.8 mg/dL (2.5-5.0)   07/21/18  03:30    


 


Magnesium  2.4 mg/dL (1.9-2.7)   07/21/18  03:30    


 


Total Bilirubin  0.5 mg/dL (0.3-1.0)   07/21/18  03:30    


 


AST  27 U/L (13-39)   07/21/18  03:30    


 


ALT  20 U/L (7-52)   07/21/18  03:30    


 


Alkaline Phosphatase  109 U/L ()  H  07/21/18  03:30    


 


Ammonia  53 umol/L (16-53)   07/22/18  05:06    


 


Troponin I  < 0.01 ng/mL (0.01-0.05)  L  07/21/18  03:30    


 


B-Natriuretic Peptide  87.0 pg/mL (5.0-100.0)   07/22/18  05:06    


 


Total Protein  8.8 gm/dL (6.0-8.3)  H  07/21/18  03:30    


 


Albumin  5.0 gm/dL (4.2-5.5)   07/21/18  03:30    


 


Globulin  3.8 gm/dL  07/21/18  03:30    


 


Albumin/Globulin Ratio  1.3  (1.0-1.8)   07/21/18  03:30    


 


Amylase  63 U/L ()   07/21/18  03:30    


 


Lipase  42 U/L (11-82)   07/21/18  03:30    


 


Urine Source  CLEAN C   07/22/18  03:44    


 


Urine Color  BROWN   07/22/18  03:44    


 


Urine Clarity  CLEAR  (CLEAR)   07/22/18  03:44    


 


Urine pH  5.5  (4.6 - 8.0)   07/22/18  03:44    


 


Ur Specific Gravity  1.025  (1.005-1.030)   07/22/18  03:44    


 


Urine Protein  TRACE mg/dL (NEGATIVE)   07/22/18  03:44    


 


Urine Glucose (UA)  NEGATIVE mg/dL (NEGATIVE)   07/22/18  03:44    


 


Urine Ketones  TRACE mg/dL (NEGATIVE)   07/22/18  03:44    


 


Urine Blood  NEGATIVE  (NEGATIVE)   07/22/18  03:44    


 


Urine Nitrate  NEGATIVE  (NEGATIVE)   07/22/18  03:44    


 


Urine Bilirubin  NEGATIVE  (NEGATIVE)   07/22/18  03:44    


 


Urine Urobilinogen  0.2 E.U./dL (0.2 - 1.0)   07/22/18  03:44    


 


Ur Leukocyte Esterase  NEGATIVE  (NEGATIVE)   07/22/18  03:44    


 


Urine RBC  NONE SEEN /hpf (0-5)   07/22/18  03:44    


 


Urine WBC  NONE SEEN /hpf (0-5)   07/22/18  03:44    


 


Ur Epithelial Cells  NONE SEEN /lpf (FEW)   07/22/18  03:44    


 


Urine Bacteria  NONE SEEN /hpf (NONE SEEN)   07/22/18  03:44    


 


Blood Type  O POSITIVE   07/21/18  03:30    


 


Antibody Screen  NEGATIVE   07/21/18  03:30    














- Physical Exam


Vitals and I&O: 


 Vital Signs











Temp  98.6 F   07/23/18 04:00


 


Pulse  67   07/23/18 04:00


 


Resp  21   07/23/18 06:43


 


BP  115/52   07/23/18 04:00


 


Pulse Ox  98   07/23/18 04:00








 Intake & Output











 07/22/18 07/23/18 07/23/18





 18:59 06:59 18:59


 


Intake Total 1805 1205 


 


Output Total 1  


 


Balance 1804 1205 


 


Weight (lbs) 67.585 kg 67.585 kg 


 


Intake:   


 


  Intake, IV Amount 1105 1055 


 


    Piperacillin Sodium/ 100 50 





    Tazobact 3.375 gm In   





    Sodium Chloride 0.9% 50   





    ml @ 100 mls/hr IV Q8H   





    [facility] Rx#:[id number]   


 


    Potassium Chloride 10 meq 1005 1005 





    In D5-0.9%Ns 1,000 ml @   





    75 mls/hr IV .[id number] [facility]   





    Rx#:[id number]   


 


  Oral 700 150 


 


Output:   


 


  Emesis 1  


 


Other:   


 


  # Voids 3 3 


 


  # Bowel Movements  1 


 


  Stool Characteristics  Soft 


 


  Weight Source Bedscale Bedscale 











Active Medications: 


Current Medications





Albuterol/Ipratropium (Duoneb Neb)  3 ml HHN Q2H PRN


   PRN Reason: Wheezing


   Stop: 09/19/18 14:09


Amiodarone HCl (Cordarone)  200 mg PO DAILY [facility]


   Stop: 09/19/18 08:59


   Last Admin: 07/22/18 08:57 Dose:  200 mg


Cyanocobalamin (Vitamin B12)  1,000 mcg PO DAILY [facility]


   Stop: 09/19/18 08:59


   Last Admin: 07/22/18 08:57 Dose:  1,000 mcg


Diltiazem HCl (Cardizem)  60 mg PO DAILY [facility]


   Stop: 09/19/18 08:59


   Last Admin: 07/22/18 08:57 Dose:  60 mg


Divalproex Sodium (Depakote Dr)  500 mg PO BID [facility]; Protocol


   Stop: 09/19/18 08:59


   Last Admin: 07/22/18 16:56 Dose:  500 mg


Docusate Sodium (Colace)  100 mg PO BID PRN


   PRN Reason: CONSTIPATION


   Stop: 09/19/18 08:47


Ferrous Sulfate (Iron)  325 mg PO DAILY [facility]


   Stop: 09/19/18 08:59


   Last Admin: 07/22/18 08:58 Dose:  325 mg


Gabapentin (Neurontin)  300 mg PO TID [facility]


   Stop: 09/19/18 08:59


   Last Admin: 07/22/18 20:35 Dose:  300 mg


Potassium Chloride 10 meq/ (Dextrose/Sodium Chloride)  1,005 mls @ 75 mls/hr IV 

.[id number] [facility]


   Stop: 09/19/18 08:44


   Last Admin: 07/23/18 03:34 Dose:  75 mls/hr


Piperacillin Sod/Tazobactam (Sod 3.375 gm/ Sodium Chloride)  50 mls @ 100 mls/

hr IV Q8H [facility]


   Stop: 09/19/18 13:14


   Last Admin: 07/23/18 04:27 Dose:  100 mls/hr


Lorazepam (Ativan)  1 mg IVP Q6HR PRN; Protocol


   PRN Reason: Agitation


   Stop: 09/19/18 12:32


   Last Admin: 07/23/18 02:23 Dose:  1 mg


Mirtazapine (Remeron)  15 mg PO HS [facility]; Protocol


   Stop: 09/19/18 20:59


   Last Admin: 07/22/18 20:35 Dose:  15 mg


Multivitamins/Vitamin C (Theragran)  1 tab PO DAILY [facility]


   Stop: 09/19/18 08:59


   Last Admin: 07/22/18 08:57 Dose:  1 tab


Olanzapine (Zyprexa)  10 mg PO HS [facility]


   Stop: 09/19/18 20:59


   Last Admin: 07/22/18 20:35 Dose:  10 mg


Ondansetron HCl (Zofran)  4 mg IV Q6H PRN


   PRN Reason: Nausea / Vomiting


   Stop: 09/20/18 19:24


Oxybutynin Chloride (Ditropan)  5 mg PO HS [facility]


   Stop: 09/19/18 20:59


   Last Admin: 07/22/18 20:35 Dose:  5 mg


Pantoprazole Sodium (Protonix)  40 mg IVP BID [facility]


   Stop: 09/19/18 16:59


   Last Admin: 07/22/18 16:56 Dose:  40 mg


Sodium Chloride (Nacl Tab)  1 gm PO BID [facility]


   Stop: 09/19/18 08:59


   Last Admin: 07/22/18 16:56 Dose:  1 gm


Tamsulosin HCl (Flomax)  0.4 mg PO DAILY [facility]


   Stop: 09/19/18 08:59


   Last Admin: 07/22/18 08:57 Dose:  0.4 mg








General: Alert


HEENT: Atraumatic


Neck: Supple


Cardiovascular: Regular rate


Abdomen: Bowel sounds, Soft, no Tender, no Hepatomegaly, no Splenomegaly, no 

Distended, no Rebound, no Mass, no Guarding





Assessment/Plan





- Problem List


Patient Problems: 


All Active Problems





Coffee ground emesis (Acute) K92.0











- Assessment


Assessment: 


# Coffee ground emesis


# Schizophrenia





Pt have have gastroenteritis, gastroparesis, esophagitis, or peptic ulcer 

disease as the cause of his vomiting. EGD indicated given the coffee ground 

color, but we will need to obtain consent from conservator.





Pt no longer having any evidence of GI bleed, and he is not vomiting. There is 

no urgency to EGD, although should likely still be performed non urgently to 

investigate the coffee grounds that the pt was reported to have at his 

facility. Still would need consent from conservator for this.





Plan:





- non urgent EGD if consent can be obtained


- psychiatric optimization


- PPI q12


- diet as tolerated


- anti emetics

## 2018-07-24 LAB
ALBUMIN SERPL-MCNC: 3.9 GM/DL (ref 4.2–5.5)
ALBUMIN/GLOB SERPL: 1.5 {RATIO} (ref 1–1.8)
ALP SERPL-CCNC: 90 U/L (ref 34–104)
ALT SERPL-CCNC: 18 U/L (ref 7–52)
ANION GAP SERPL CALC-SCNC: 9.7 MMOL/L (ref 7–16)
AST SERPL-CCNC: 21 U/L (ref 13–39)
BASOPHILS # BLD AUTO: 0 TH/CUMM (ref 0–0.2)
BASOPHILS NFR BLD AUTO: 0.5 % (ref 0–2)
BILIRUB SERPL-MCNC: 0.2 MG/DL (ref 0.3–1)
BUN SERPL-MCNC: 9 MG/DL (ref 7–25)
CALCIUM SERPL-MCNC: 9.1 MG/DL (ref 8.6–10.3)
CHLORIDE SERPL-SCNC: 103 MEQ/L (ref 98–107)
CO2 SERPL-SCNC: 27.1 MEQ/L (ref 21–31)
CREAT SERPL-MCNC: 0.7 MG/DL (ref 0.7–1.3)
EOSINOPHIL # BLD AUTO: 0.2 TH/CMM (ref 0.1–0.4)
EOSINOPHIL NFR BLD AUTO: 2.7 % (ref 0–5)
ERYTHROCYTE [DISTWIDTH] IN BLOOD BY AUTOMATED COUNT: 14.8 % (ref 11.5–20)
GLOBULIN SER-MCNC: 2.6 GM/DL
GLUCOSE SERPL-MCNC: 77 MG/DL (ref 70–105)
HCT VFR BLD CALC: 33.6 % (ref 41–60)
HGB BLD-MCNC: 11.5 GM/DL (ref 12–16)
LYMPHOCYTE AB SER FC-ACNC: 1.3 TH/CMM (ref 1.5–3)
LYMPHOCYTES NFR BLD AUTO: 21.5 % (ref 20–50)
MCH RBC QN AUTO: 32.2 PG (ref 27–31)
MCHC RBC AUTO-ENTMCNC: 34.4 PG (ref 28–36)
MCV RBC AUTO: 93.6 FL (ref 80–99)
MONOCYTES # BLD AUTO: 0.8 TH/CMM (ref 0.3–1)
MONOCYTES NFR BLD AUTO: 12.5 % (ref 2–10)
NEUTROPHILS # BLD: 3.8 TH/CMM (ref 1.8–8)
NEUTROPHILS NFR BLD AUTO: 62.8 % (ref 40–80)
PLATELET # BLD: 219 TH/CMM (ref 150–400)
PMV BLD AUTO: 7.3 FL
POTASSIUM SERPL-SCNC: 3.8 MEQ/L (ref 3.5–5.1)
RBC # BLD AUTO: 3.59 MIL/CMM (ref 3.8–5.8)
SODIUM SERPL-SCNC: 136 MEQ/L (ref 136–145)
WBC # BLD AUTO: 6.1 TH/CMM (ref 4.8–10.8)

## 2018-07-24 RX ADMIN — DILTIAZEM HYDROCHLORIDE SCH MG: 30 TABLET, FILM COATED ORAL at 09:09

## 2018-07-24 NOTE — GI PROGRESS NOTE
Subjective





- Review of Systems


Service Date: 07/24/18


Subjective: 


No events





Objective





- Results


Result Diagrams: 


 07/24/18 06:00





 07/24/18 06:00


Recent Labs: 


 Laboratory Last Values











WBC  6.1 Th/cmm (4.8-10.8)   07/24/18  06:00    


 


RBC  3.59 Mil/cmm (3.80-5.80)  L  07/24/18  06:00    


 


Hgb  11.5 gm/dL (12-16)  L  07/24/18  06:00    


 


Hct  33.6 % (41.0-60)  L  07/24/18  06:00    


 


MCV  93.6 fl (80-99)   07/24/18  06:00    


 


MCH  32.2 pg (27.0-31.0)  H  07/24/18  06:00    


 


MCHC Differential  34.4 pg (28.0-36.0)   07/24/18  06:00    


 


RDW  14.8 % (11.5-20.0)   07/24/18  06:00    


 


Plt Count  219 Th/cmm (150-400)   07/24/18  06:00    


 


MPV  7.3 fl  07/24/18  06:00    


 


Neutrophils %  62.8 % (40.0-80.0)   07/24/18  06:00    


 


Lymphocytes %  21.5 % (20.0-50.0)   07/24/18  06:00    


 


Monocytes %  12.5 % (2.0-10.0)  H  07/24/18  06:00    


 


Eosinophils %  2.7 % (0.0-5.0)   07/24/18  06:00    


 


Basophils %  0.5 % (0.0-2.0)   07/24/18  06:00    


 


Sodium  136 mEq/L (136-145)   07/24/18  06:00    


 


Potassium  3.8 mEq/L (3.5-5.1)   07/24/18  06:00    


 


Chloride  103 mEq/L ()   07/24/18  06:00    


 


Carbon Dioxide  27.1 mEq/L (21.0-31.0)   07/24/18  06:00    


 


Anion Gap  9.7  (7.0-16.0)   07/24/18  06:00    


 


BUN  9 mg/dL (7-25)   07/24/18  06:00    


 


Creatinine  0.7 mg/dL (0.7-1.3)   07/24/18  06:00    


 


Est GFR ( Amer)  > 60.0 ml/min (>90)   07/24/18  06:00    


 


Est GFR (Non-Af Amer)  > 60.0 ml/min  07/24/18  06:00    


 


BUN/Creatinine Ratio  12.9   07/24/18  06:00    


 


Glucose  77 mg/dL ()   07/24/18  06:00    


 


Calcium  9.1 mg/dL (8.6-10.3)   07/24/18  06:00    


 


Phosphorus  3.8 mg/dL (2.5-5.0)   07/21/18  03:30    


 


Magnesium  2.4 mg/dL (1.9-2.7)   07/21/18  03:30    


 


Total Bilirubin  0.2 mg/dL (0.3-1.0)  L  07/24/18  06:00    


 


AST  21 U/L (13-39)   07/24/18  06:00    


 


ALT  18 U/L (7-52)   07/24/18  06:00    


 


Alkaline Phosphatase  90 U/L ()   07/24/18  06:00    


 


Ammonia  53 umol/L (16-53)   07/22/18  05:06    


 


Troponin I  < 0.01 ng/mL (0.01-0.05)  L  07/21/18  03:30    


 


B-Natriuretic Peptide  87.0 pg/mL (5.0-100.0)   07/22/18  05:06    


 


Total Protein  6.5 gm/dL (6.0-8.3)   07/24/18  06:00    


 


Albumin  3.9 gm/dL (4.2-5.5)  L  07/24/18  06:00    


 


Globulin  2.6 gm/dL  07/24/18  06:00    


 


Albumin/Globulin Ratio  1.5  (1.0-1.8)   07/24/18  06:00    


 


Amylase  63 U/L ()   07/21/18  03:30    


 


Lipase  42 U/L (11-82)   07/21/18  03:30    


 


Urine Source  CLEAN C   07/22/18  03:44    


 


Urine Color  BROWN   07/22/18  03:44    


 


Urine Clarity  CLEAR  (CLEAR)   07/22/18  03:44    


 


Urine pH  5.5  (4.6 - 8.0)   07/22/18  03:44    


 


Ur Specific Gravity  1.025  (1.005-1.030)   07/22/18  03:44    


 


Urine Protein  TRACE mg/dL (NEGATIVE)   07/22/18  03:44    


 


Urine Glucose (UA)  NEGATIVE mg/dL (NEGATIVE)   07/22/18  03:44    


 


Urine Ketones  TRACE mg/dL (NEGATIVE)   07/22/18  03:44    


 


Urine Blood  NEGATIVE  (NEGATIVE)   07/22/18  03:44    


 


Urine Nitrate  NEGATIVE  (NEGATIVE)   07/22/18  03:44    


 


Urine Bilirubin  NEGATIVE  (NEGATIVE)   07/22/18  03:44    


 


Urine Urobilinogen  0.2 E.U./dL (0.2 - 1.0)   07/22/18  03:44    


 


Ur Leukocyte Esterase  NEGATIVE  (NEGATIVE)   07/22/18  03:44    


 


Urine RBC  NONE SEEN /hpf (0-5)   07/22/18  03:44    


 


Urine WBC  NONE SEEN /hpf (0-5)   07/22/18  03:44    


 


Ur Epithelial Cells  NONE SEEN /lpf (FEW)   07/22/18  03:44    


 


Urine Bacteria  NONE SEEN /hpf (NONE SEEN)   07/22/18  03:44    


 


Blood Type  O POSITIVE   07/21/18  03:30    


 


Antibody Screen  NEGATIVE   07/21/18  03:30    














- Physical Exam


Vitals and I&O: 


 Vital Signs











Temp  98.0 F   07/24/18 04:00


 


Pulse  64   07/24/18 04:00


 


Resp  20   07/24/18 04:00


 


BP  98/73   07/24/18 04:00


 


Pulse Ox  98   07/24/18 04:00








 Intake & Output











 07/23/18 07/24/18 07/24/18





 18:59 06:59 18:59


 


Intake Total 3355 907.5 


 


Balance 3355 907.5 


 


Weight (lbs) 66.678 kg  


 


Intake:   


 


  Intake, IV Amount 1055 907.5 


 


    Piperacillin Sodium/ 50 50 





    Tazobact 3.375 gm In   





    Sodium Chloride 0.9% 50   





    ml @ 100 mls/hr IV Q8H   





    [facility] Rx#:[id number]   


 


    Potassium Chloride 10 meq 1005 857.5 





    In D5-0.9%Ns 1,000 ml @   





    75 mls/hr IV .[id number] [facility]   





    Rx#:[id number]   


 


  Oral 2300  


 


Other:   


 


  # Voids 5  


 


  # Bowel Movements 2  


 


  Stool Characteristics Liquid  


 


  Weight Source Bedscale  











Active Medications: 


Current Medications





Albuterol/Ipratropium (Duoneb Neb)  3 ml HHN Q2H PRN


   PRN Reason: Wheezing


   Stop: 09/19/18 14:09


Amiodarone HCl (Cordarone)  200 mg PO DAILY [facility]


   Stop: 09/19/18 08:59


   Last Admin: 07/23/18 09:20 Dose:  200 mg


Cyanocobalamin (Vitamin B12)  1,000 mcg PO DAILY [facility]


   Stop: 09/19/18 08:59


   Last Admin: 07/23/18 09:19 Dose:  1,000 mcg


Diltiazem HCl (Cardizem)  60 mg PO DAILY [facility]


   Stop: 09/19/18 08:59


   Last Admin: 07/23/18 09:19 Dose:  60 mg


Divalproex Sodium (Depakote Dr)  500 mg PO BID [facility]; [facility]


   Stop: 09/19/18 08:59


   Last Admin: 07/23/18 17:07 Dose:  500 mg


Docusate Sodium (Colace)  100 mg PO BID PRN


   PRN Reason: CONSTIPATION


   Stop: 09/19/18 08:47


Ferrous Sulfate (Iron)  325 mg PO DAILY [facility]


   Stop: 09/19/18 08:59


   Last Admin: 07/23/18 09:20 Dose:  325 mg


Gabapentin (Neurontin)  300 mg PO TID [facility]


   Stop: 09/19/18 08:59


   Last Admin: 07/23/18 21:27 Dose:  300 mg


Potassium Chloride 10 meq/ (Dextrose/Sodium Chloride)  1,005 mls @ 75 mls/hr IV 

[id number] [facility]


   Stop: 09/19/18 08:44


   Last Infusion: 07/24/18 04:24 Dose:  0 mls/hr


Piperacillin Sod/Tazobactam (Sod 3.375 gm/ Sodium Chloride)  50 mls @ 100 mls/

hr IV Q8H [facility]


   Stop: 09/19/18 13:14


   Last Admin: 07/24/18 06:39 Dose:  Not Given


Lorazepam (Ativan)  1 mg IVP Q6HR PRN; Protocol


   PRN Reason: Agitation


   Stop: 09/19/18 12:32


   Last Admin: 07/23/18 09:31 Dose:  1 mg


Mirtazapine (Remeron)  15 mg PO HS [facility]; [facility]


   Stop: 09/19/18 20:59


   Last Admin: 07/23/18 21:27 Dose:  15 mg


Multivitamins/Vitamin C (Theragran)  1 tab PO DAILY [facility]


   Stop: 09/19/18 08:59


   Last Admin: 07/23/18 09:20 Dose:  1 tab


Olanzapine (Zyprexa)  10 mg PO HS [facility]


   Stop: 09/19/18 20:59


   Last Admin: 07/23/18 21:27 Dose:  10 mg


Ondansetron HCl (Zofran)  4 mg IV Q6H PRN


   PRN Reason: Nausea / Vomiting


   Stop: 09/20/18 19:24


Oxybutynin Chloride (Ditropan)  5 mg PO HS [facility]


   Stop: 09/19/18 20:59


   Last Admin: 07/23/18 21:27 Dose:  5 mg


Pantoprazole Sodium (Protonix)  40 mg IVP BID [facility]


   Stop: 09/19/18 16:59


   Last Admin: 07/23/18 17:17 Dose:  40 mg


Sodium Chloride (Nacl Tab)  1 gm PO BID [facility]


   Stop: 09/19/18 08:59


   Last Admin: 07/23/18 17:07 Dose:  1 gm


Tamsulosin HCl (Flomax)  0.4 mg PO DAILY [facility]


   Stop: 09/19/18 08:59


   Last Admin: 07/23/18 09:20 Dose:  0.4 mg








General: Alert


HEENT: Atraumatic


Neck: Supple


Cardiovascular: Regular rate


Abdomen: Bowel sounds, Soft, no Tender, no Hepatomegaly, no Splenomegaly, no 

Distended, no Rebound, no Mass, no Guarding





Assessment/Plan





- Problem List


Patient Problems: 


All Active Problems





Coffee ground emesis (Acute) K92.0











- Assessment


Assessment: 


# Coffee ground emesis


# Schizophrenia





Pt have have gastroenteritis, gastroparesis, esophagitis, or peptic ulcer 

disease as the cause of his vomiting. EGD indicated given the coffee ground 

color, but we will need to obtain consent from conservator.





Pt no longer having any evidence of GI bleed, and he is not vomiting. There is 

no urgency to EGD, although should likely still be performed non urgently to 

investigate the coffee grounds that the pt was reported to have at his 

facility. Still would need consent from conservator for this.





Plan:





- non urgent EGD if consent can be obtained


- psychiatric optimization


- PPI q12


- diet as tolerated


- anti emetics

## 2018-07-24 NOTE — INTERNAL MEDICINE PROG NOTE
Internal Medicine Subjective





- Subjective


Service Date: 07/24/18


Patient seen and examined:: with staff


Patient is:: awake, verbal, interactive, in bed, confused


Patient Complaints of:: congestion


Per staff patient has:: no adverse event





Internal Medicine Objective





- Results


Result Diagrams: 


 07/24/18 06:00





 07/24/18 06:00


Recent Labs: 


 Laboratory Last Values











WBC  6.1 Th/cmm (4.8-10.8)   07/24/18  06:00    


 


RBC  3.59 Mil/cmm (3.80-5.80)  L  07/24/18  06:00    


 


Hgb  11.5 gm/dL (12-16)  L  07/24/18  06:00    


 


Hct  33.6 % (41.0-60)  L  07/24/18  06:00    


 


MCV  93.6 fl (80-99)   07/24/18  06:00    


 


MCH  32.2 pg (27.0-31.0)  H  07/24/18  06:00    


 


MCHC Differential  34.4 pg (28.0-36.0)   07/24/18  06:00    


 


RDW  14.8 % (11.5-20.0)   07/24/18  06:00    


 


Plt Count  219 Th/cmm (150-400)   07/24/18  06:00    


 


MPV  7.3 fl  07/24/18  06:00    


 


Neutrophils %  62.8 % (40.0-80.0)   07/24/18  06:00    


 


Lymphocytes %  21.5 % (20.0-50.0)   07/24/18  06:00    


 


Monocytes %  12.5 % (2.0-10.0)  H  07/24/18  06:00    


 


Eosinophils %  2.7 % (0.0-5.0)   07/24/18  06:00    


 


Basophils %  0.5 % (0.0-2.0)   07/24/18  06:00    


 


Sodium  136 mEq/L (136-145)   07/24/18  06:00    


 


Potassium  3.8 mEq/L (3.5-5.1)   07/24/18  06:00    


 


Chloride  103 mEq/L ()   07/24/18  06:00    


 


Carbon Dioxide  27.1 mEq/L (21.0-31.0)   07/24/18  06:00    


 


Anion Gap  9.7  (7.0-16.0)   07/24/18  06:00    


 


BUN  9 mg/dL (7-25)   07/24/18  06:00    


 


Creatinine  0.7 mg/dL (0.7-1.3)   07/24/18  06:00    


 


Est GFR ( Amer)  > 60.0 ml/min (>90)   07/24/18  06:00    


 


Est GFR (Non-Af Amer)  > 60.0 ml/min  07/24/18  06:00    


 


BUN/Creatinine Ratio  12.9   07/24/18  06:00    


 


Glucose  77 mg/dL ()   07/24/18  06:00    


 


Calcium  9.1 mg/dL (8.6-10.3)   07/24/18  06:00    


 


Phosphorus  3.8 mg/dL (2.5-5.0)   07/21/18  03:30    


 


Magnesium  2.4 mg/dL (1.9-2.7)   07/21/18  03:30    


 


Total Bilirubin  0.2 mg/dL (0.3-1.0)  L  07/24/18  06:00    


 


AST  21 U/L (13-39)   07/24/18  06:00    


 


ALT  18 U/L (7-52)   07/24/18  06:00    


 


Alkaline Phosphatase  90 U/L ()   07/24/18  06:00    


 


Ammonia  53 umol/L (16-53)   07/22/18  05:06    


 


Troponin I  < 0.01 ng/mL (0.01-0.05)  L  07/21/18  03:30    


 


B-Natriuretic Peptide  87.0 pg/mL (5.0-100.0)   07/22/18  05:06    


 


Total Protein  6.5 gm/dL (6.0-8.3)   07/24/18  06:00    


 


Albumin  3.9 gm/dL (4.2-5.5)  L  07/24/18  06:00    


 


Globulin  2.6 gm/dL  07/24/18  06:00    


 


Albumin/Globulin Ratio  1.5  (1.0-1.8)   07/24/18  06:00    


 


Amylase  63 U/L ()   07/21/18  03:30    


 


Lipase  42 U/L (11-82)   07/21/18  03:30    


 


Carcinoembryonic Ag  3.2 ng/mL (0.0-4.7)   07/22/18  05:06    


 


Urine Source  CLEAN C   07/22/18  03:44    


 


Urine Color  BROWN   07/22/18  03:44    


 


Urine Clarity  CLEAR  (CLEAR)   07/22/18  03:44    


 


Urine pH  5.5  (4.6 - 8.0)   07/22/18  03:44    


 


Ur Specific Gravity  1.025  (1.005-1.030)   07/22/18  03:44    


 


Urine Protein  TRACE mg/dL (NEGATIVE)   07/22/18  03:44    


 


Urine Glucose (UA)  NEGATIVE mg/dL (NEGATIVE)   07/22/18  03:44    


 


Urine Ketones  TRACE mg/dL (NEGATIVE)   07/22/18  03:44    


 


Urine Blood  NEGATIVE  (NEGATIVE)   07/22/18  03:44    


 


Urine Nitrate  NEGATIVE  (NEGATIVE)   07/22/18  03:44    


 


Urine Bilirubin  NEGATIVE  (NEGATIVE)   07/22/18  03:44    


 


Urine Urobilinogen  0.2 E.U./dL (0.2 - 1.0)   07/22/18  03:44    


 


Ur Leukocyte Esterase  NEGATIVE  (NEGATIVE)   07/22/18  03:44    


 


Urine RBC  NONE SEEN /hpf (0-5)   07/22/18  03:44    


 


Urine WBC  NONE SEEN /hpf (0-5)   07/22/18  03:44    


 


Ur Epithelial Cells  NONE SEEN /lpf (FEW)   07/22/18  03:44    


 


Urine Bacteria  NONE SEEN /hpf (NONE SEEN)   07/22/18  03:44    


 


Blood Type  O POSITIVE   07/21/18  03:30    


 


Antibody Screen  NEGATIVE   07/21/18  03:30    














- Physical Exam


Vitals and I&O: 


 Vital Signs











Temp  98.8 F   07/24/18 20:00


 


Pulse  72   07/24/18 20:00


 


Resp  18   07/24/18 20:00


 


BP  133/68   07/24/18 20:00


 


Pulse Ox  98   07/24/18 20:00








 Intake & Output











 07/24/18 07/24/18 07/25/18





 06:59 18:59 06:59


 


Intake Total 907.5 852 


 


Balance 907.5 852 


 


Weight (lbs)  66.678 kg 


 


Intake:   


 


  Intake, IV Amount 907.5  


 


    Piperacillin Sodium/ 50  





    Tazobact 3.375 gm In   





    Sodium Chloride 0.9% 50   





    ml @ 100 mls/hr IV Q8H   





    [facility] Rx#:[id number]   


 


    Potassium Chloride 10 meq 857.5  





    In D5-0.9%Ns 1,000 ml @   





    75 mls/hr IV .[id number] [facility]   





    Rx#:[id number]   


 


  Oral  852 


 


Other:   


 


  Stool Characteristics  Soft 





  Formed 





  Liquid 


 


  Weight Source  Bedscale 











Active Medications: 


Current Medications





Albuterol/Ipratropium (Duoneb Neb)  3 ml HHN Q2H PRN


   PRN Reason: Wheezing


   Stop: 09/19/18 14:09


Amiodarone HCl (Cordarone)  200 mg PO DAILY [facility]


   Stop: 09/19/18 08:59


   Last Admin: 07/24/18 09:09 Dose:  200 mg


Cyanocobalamin (Vitamin B12)  1,000 mcg PO DAILY [facility]


   Stop: 09/19/18 08:59


   Last Admin: 07/24/18 09:09 Dose:  1,000 mcg


Diltiazem HCl (Cardizem)  60 mg PO DAILY [facility]


   Stop: 09/19/18 08:59


   Last Admin: 07/24/18 09:09 Dose:  60 mg


Divalproex Sodium (Depakote Dr)  500 mg PO BID [facility]; Protocol


   Stop: 09/19/18 08:59


   Last Admin: 07/24/18 16:26 Dose:  500 mg


Docusate Sodium (Colace)  100 mg PO BID PRN


   PRN Reason: CONSTIPATION


   Stop: 09/19/18 08:47


Ferrous Sulfate (Iron)  325 mg PO DAILY [facility]


   Stop: 09/19/18 08:59


   Last Admin: 07/24/18 09:10 Dose:  325 mg


Gabapentin (Neurontin)  300 mg PO TID [facility]


   Stop: 09/19/18 08:59


   Last Admin: 07/24/18 21:23 Dose:  300 mg


Potassium Chloride 10 meq/ (Dextrose/Sodium Chloride)  1,005 mls @ 75 mls/hr IV 

[id number] [facility]


   Stop: 09/19/18 08:44


   Last Infusion: 07/24/18 04:24 Dose:  0 mls/hr


Piperacillin Sod/Tazobactam (Sod 3.375 gm/ Sodium Chloride)  50 mls @ 100 mls/

hr IV Q8H [facility]


   Stop: 09/19/18 13:14


   Last Admin: 07/24/18 22:12 Dose:  Not Given


Lorazepam (Ativan)  1 mg IVP Q6HR PRN; Protocol


   PRN Reason: Agitation


   Stop: 09/19/18 12:32


   Last Admin: 07/23/18 09:31 Dose:  1 mg


Mirtazapine (Remeron)  15 mg PO HS [facility]; Protocol


   Stop: 09/19/18 20:59


   Last Admin: 07/24/18 21:22 Dose:  15 mg


Multivitamins/Vitamin C (Theragran)  1 tab PO DAILY [facility]


   Stop: 09/19/18 08:59


   Last Admin: 07/24/18 09:09 Dose:  1 tab


Olanzapine (Zyprexa)  10 mg PO HS [facility]


   Stop: 09/19/18 20:59


   Last Admin: 07/24/18 21:22 Dose:  10 mg


Ondansetron HCl (Zofran)  4 mg IV Q6H PRN


   PRN Reason: Nausea / Vomiting


   Stop: 09/20/18 19:24


Oxybutynin Chloride (Ditropan)  5 mg PO HS [facility]


   Stop: 09/19/18 20:59


   Last Admin: 07/24/18 21:23 Dose:  5 mg


Pantoprazole Sodium (Protonix)  40 mg IVP BID [facility]


   Stop: 09/19/18 16:59


   Last Admin: 07/24/18 16:52 Dose:  Not Given


Sodium Chloride (Nacl Tab)  1 gm PO BID [facility]


   Stop: 09/19/18 08:59


   Last Admin: 07/24/18 16:26 Dose:  1 gm


Tamsulosin HCl (Flomax)  0.4 mg PO DAILY [facility]


   Stop: 09/19/18 08:59


   Last Admin: 07/24/18 09:13 Dose:  0.4 mg








General: weak, lethargic, congested


HEENT: NC/AT, PERRLA, EOMI, anicteric sclerae, throat clear


Neck: Supple, No JVD, No thyromegaly


Lungs: wheezing, ronchi


Cardiovascular: RRR, Normal S1


Abdomen: soft, non-tender, non-distended, positive bowel sound


Extremities: clear


Neurological: no change





Internal Medicine Assmt/Plan





- Assessment


Assessment: 


ALOC: multifactorial.





h/o A. Fib: rate controlled.





BL early PNA: IVPB ABX.





Acute UGIB in SNF: observe; GI consultation.





Noncompliance: education provided.





Psychosis: continue meds.





DVT prophylaxis.

## 2018-07-25 RX ADMIN — DILTIAZEM HYDROCHLORIDE SCH MG: 30 TABLET, FILM COATED ORAL at 10:10

## 2018-07-25 NOTE — GI PROGRESS NOTE
Subjective





- Review of Systems


Service Date: 07/25/18


Subjective: 


No events, walking the halls. No vomiting





Objective





- Results


Result Diagrams: 


 07/24/18 06:00





 07/24/18 06:00


Recent Labs: 


 Laboratory Last Values











WBC  6.1 Th/cmm (4.8-10.8)   07/24/18  06:00    


 


RBC  3.59 Mil/cmm (3.80-5.80)  L  07/24/18  06:00    


 


Hgb  11.5 gm/dL (12-16)  L  07/24/18  06:00    


 


Hct  33.6 % (41.0-60)  L  07/24/18  06:00    


 


MCV  93.6 fl (80-99)   07/24/18  06:00    


 


MCH  32.2 pg (27.0-31.0)  H  07/24/18  06:00    


 


MCHC Differential  34.4 pg (28.0-36.0)   07/24/18  06:00    


 


RDW  14.8 % (11.5-20.0)   07/24/18  06:00    


 


Plt Count  219 Th/cmm (150-400)   07/24/18  06:00    


 


MPV  7.3 fl  07/24/18  06:00    


 


Neutrophils %  62.8 % (40.0-80.0)   07/24/18  06:00    


 


Lymphocytes %  21.5 % (20.0-50.0)   07/24/18  06:00    


 


Monocytes %  12.5 % (2.0-10.0)  H  07/24/18  06:00    


 


Eosinophils %  2.7 % (0.0-5.0)   07/24/18  06:00    


 


Basophils %  0.5 % (0.0-2.0)   07/24/18  06:00    


 


Sodium  136 mEq/L (136-145)   07/24/18  06:00    


 


Potassium  3.8 mEq/L (3.5-5.1)   07/24/18  06:00    


 


Chloride  103 mEq/L ()   07/24/18  06:00    


 


Carbon Dioxide  27.1 mEq/L (21.0-31.0)   07/24/18  06:00    


 


Anion Gap  9.7  (7.0-16.0)   07/24/18  06:00    


 


BUN  9 mg/dL (7-25)   07/24/18  06:00    


 


Creatinine  0.7 mg/dL (0.7-1.3)   07/24/18  06:00    


 


Est GFR ( Amer)  > 60.0 ml/min (>90)   07/24/18  06:00    


 


Est GFR (Non-Af Amer)  > 60.0 ml/min  07/24/18  06:00    


 


BUN/Creatinine Ratio  12.9   07/24/18  06:00    


 


Glucose  77 mg/dL ()   07/24/18  06:00    


 


Calcium  9.1 mg/dL (8.6-10.3)   07/24/18  06:00    


 


Phosphorus  3.8 mg/dL (2.5-5.0)   07/21/18  03:30    


 


Magnesium  2.4 mg/dL (1.9-2.7)   07/21/18  03:30    


 


Total Bilirubin  0.2 mg/dL (0.3-1.0)  L  07/24/18  06:00    


 


AST  21 U/L (13-39)   07/24/18  06:00    


 


ALT  18 U/L (7-52)   07/24/18  06:00    


 


Alkaline Phosphatase  90 U/L ()   07/24/18  06:00    


 


Ammonia  53 umol/L (16-53)   07/22/18  05:06    


 


Troponin I  < 0.01 ng/mL (0.01-0.05)  L  07/21/18  03:30    


 


B-Natriuretic Peptide  87.0 pg/mL (5.0-100.0)   07/22/18  05:06    


 


Total Protein  6.5 gm/dL (6.0-8.3)   07/24/18  06:00    


 


Albumin  3.9 gm/dL (4.2-5.5)  L  07/24/18  06:00    


 


Globulin  2.6 gm/dL  07/24/18  06:00    


 


Albumin/Globulin Ratio  1.5  (1.0-1.8)   07/24/18  06:00    


 


Amylase  63 U/L ()   07/21/18  03:30    


 


Lipase  42 U/L (11-82)   07/21/18  03:30    


 


Carcinoembryonic Ag  3.2 ng/mL (0.0-4.7)   07/22/18  05:06    


 


Urine Source  CLEAN C   07/22/18  03:44    


 


Urine Color  BROWN   07/22/18  03:44    


 


Urine Clarity  CLEAR  (CLEAR)   07/22/18  03:44    


 


Urine pH  5.5  (4.6 - 8.0)   07/22/18  03:44    


 


Ur Specific Gravity  1.025  (1.005-1.030)   07/22/18  03:44    


 


Urine Protein  TRACE mg/dL (NEGATIVE)   07/22/18  03:44    


 


Urine Glucose (UA)  NEGATIVE mg/dL (NEGATIVE)   07/22/18  03:44    


 


Urine Ketones  TRACE mg/dL (NEGATIVE)   07/22/18  03:44    


 


Urine Blood  NEGATIVE  (NEGATIVE)   07/22/18  03:44    


 


Urine Nitrate  NEGATIVE  (NEGATIVE)   07/22/18  03:44    


 


Urine Bilirubin  NEGATIVE  (NEGATIVE)   07/22/18  03:44    


 


Urine Urobilinogen  0.2 E.U./dL (0.2 - 1.0)   07/22/18  03:44    


 


Ur Leukocyte Esterase  NEGATIVE  (NEGATIVE)   07/22/18  03:44    


 


Urine RBC  NONE SEEN /hpf (0-5)   07/22/18  03:44    


 


Urine WBC  NONE SEEN /hpf (0-5)   07/22/18  03:44    


 


Ur Epithelial Cells  NONE SEEN /lpf (FEW)   07/22/18  03:44    


 


Urine Bacteria  NONE SEEN /hpf (NONE SEEN)   07/22/18  03:44    


 


Blood Type  O POSITIVE   07/21/18  03:30    


 


Antibody Screen  NEGATIVE   07/21/18  03:30    














- Physical Exam


Vitals and I&O: 


 Vital Signs











Temp  97.4 F   07/25/18 07:44


 


Pulse  80   07/25/18 07:44


 


Resp  18   07/25/18 07:44


 


BP  117/70   07/25/18 07:44


 


Pulse Ox  96   07/25/18 07:44








 Intake & Output











 07/24/18 07/25/18 07/25/18





 18:59 06:59 18:59


 


Intake Total 852 500 


 


Balance 852 500 


 


Weight (lbs) 66.678 kg 65.317 kg 


 


Intake:   


 


  Oral 852 500 


 


Other:   


 


  # Voids  4 


 


  # Bowel Movements  0 


 


  Stool Characteristics Soft  





 Formed  





 Liquid  


 


  Weight Source Bedscale Bedscale 











Active Medications: 


Current Medications





Albuterol/Ipratropium (Duoneb Neb)  3 ml HHN Q2H PRN


   PRN Reason: Wheezing


   Stop: 09/19/18 14:09


Amiodarone HCl (Cordarone)  200 mg PO DAILY [facility]


   Stop: 09/19/18 08:59


   Last Admin: 07/24/18 09:09 Dose:  200 mg


Cyanocobalamin (Vitamin B12)  1,000 mcg PO DAILY [facility]


   Stop: 09/19/18 08:59


   Last Admin: 07/24/18 09:09 Dose:  1,000 mcg


Diltiazem HCl (Cardizem)  60 mg PO DAILY [facility]


   Stop: 09/19/18 08:59


   Last Admin: 07/24/18 09:09 Dose:  60 mg


Divalproex Sodium (Depakote Dr)  500 mg PO BID [facility]; Protocol


   Stop: 09/19/18 08:59


   Last Admin: 07/24/18 16:26 Dose:  500 mg


Docusate Sodium (Colace)  100 mg PO BID PRN


   PRN Reason: CONSTIPATION


   Stop: 09/19/18 08:47


Ferrous Sulfate (Iron)  325 mg PO DAILY [facility]


   Stop: 09/19/18 08:59


   Last Admin: 07/24/18 09:10 Dose:  325 mg


Gabapentin (Neurontin)  300 mg PO TID [facility]


   Stop: 09/19/18 08:59


   Last Admin: 07/24/18 21:23 Dose:  300 mg


Potassium Chloride 10 meq/ (Dextrose/Sodium Chloride)  1,005 mls @ 75 mls/hr IV 

.[id number] [facility]


   Stop: 09/19/18 08:44


   Last Infusion: 07/24/18 04:24 Dose:  0 mls/hr


Piperacillin Sod/Tazobactam (Sod 3.375 gm/ Sodium Chloride)  50 mls @ 100 mls/

hr IV Q8H [facility]


   Stop: 09/19/18 13:14


   Last Admin: 07/25/18 05:49 Dose:  Not Given


Lorazepam (Ativan)  1 mg IVP Q6HR PRN; Protocol


   PRN Reason: Agitation


   Stop: 09/19/18 12:32


   Last Admin: 07/23/18 09:31 Dose:  1 mg


Mirtazapine (Remeron)  15 mg PO HS [facility]; Protocol


   Stop: 09/19/18 20:59


   Last Admin: 07/24/18 21:22 Dose:  15 mg


Multivitamins/Vitamin C (Theragran)  1 tab PO DAILY [facility]


   Stop: 09/19/18 08:59


   Last Admin: 07/24/18 09:09 Dose:  1 tab


Olanzapine (Zyprexa)  10 mg PO HS [facility]


   Stop: 09/19/18 20:59


   Last Admin: 07/24/18 21:22 Dose:  10 mg


Ondansetron HCl (Zofran)  4 mg IV Q6H PRN


   PRN Reason: Nausea / Vomiting


   Stop: 09/20/18 19:24


Oxybutynin Chloride (Ditropan)  5 mg PO HS [facility]


   Stop: 09/19/18 20:59


   Last Admin: 07/24/18 21:23 Dose:  5 mg


Pantoprazole Sodium (Protonix)  40 mg IVP BID [facility]


   Stop: 09/19/18 16:59


   Last Admin: 07/24/18 16:52 Dose:  Not Given


Sodium Chloride (Nacl Tab)  1 gm PO BID [facility]


   Stop: 09/19/18 08:59


   Last Admin: 07/24/18 16:26 Dose:  1 gm


Tamsulosin HCl (Flomax)  0.4 mg PO DAILY [facility]


   Stop: 09/19/18 08:59


   Last Admin: 07/24/18 09:13 Dose:  0.4 mg








General: Alert


HEENT: Atraumatic


Neck: Supple


Cardiovascular: Regular rate


Abdomen: Bowel sounds, Soft, no Tender, no Hepatomegaly, no Splenomegaly, no 

Distended, no Rebound, no Mass, no Guarding





Assessment/Plan





- Problem List


Patient Problems: 


All Active Problems





Coffee ground emesis (Acute) K92.0











- Assessment


Assessment: 


# Coffee ground emesis


# Schizophrenia





Pt have have gastroenteritis, gastroparesis, esophagitis, or peptic ulcer 

disease as the cause of his vomiting. EGD indicated given the coffee ground 

color, but we will need to obtain consent from conservator.





Pt no longer having any evidence of GI bleed, and he is not vomiting. There is 

no urgency to EGD, although should likely still be performed non urgently to 

investigate the coffee grounds that the pt was reported to have at his 

facility. Still would need consent from conservator for this.








Plan:





- non urgent EGD if consent can be obtained


- psychiatric optimization


- PPI q12


- diet as tolerated


- anti emetics








GI is available to see this patient as needed, or if consent is obtained for 

EGD. Please call with any questions

## 2018-07-26 RX ADMIN — DILTIAZEM HYDROCHLORIDE SCH MG: 30 TABLET, FILM COATED ORAL at 09:55

## 2018-07-27 RX ADMIN — PANTOPRAZOLE SODIUM SCH MG: 40 TABLET, DELAYED RELEASE ORAL at 17:53

## 2018-07-27 RX ADMIN — DILTIAZEM HYDROCHLORIDE SCH MG: 30 TABLET, FILM COATED ORAL at 09:37

## 2018-07-27 RX ADMIN — PANTOPRAZOLE SODIUM SCH MG: 40 TABLET, DELAYED RELEASE ORAL at 09:37

## 2018-07-27 NOTE — INTERNAL MEDICINE PROG NOTE
Internal Medicine Subjective





- Subjective


Service Date: 07/26/18


Patient seen and examined:: without staff


Patient is:: awake, verbal, interactive, in bed, confused


Patient Complaints of:: congestion


Per staff patient has:: no adverse event





Internal Medicine Objective





- Results


Result Diagrams: 


 07/24/18 06:00





 07/24/18 06:00


Recent Labs: 


 Laboratory Last Values











WBC  6.1 Th/cmm (4.8-10.8)   07/24/18  06:00    


 


RBC  3.59 Mil/cmm (3.80-5.80)  L  07/24/18  06:00    


 


Hgb  11.5 gm/dL (12-16)  L  07/24/18  06:00    


 


Hct  33.6 % (41.0-60)  L  07/24/18  06:00    


 


MCV  93.6 fl (80-99)   07/24/18  06:00    


 


MCH  32.2 pg (27.0-31.0)  H  07/24/18  06:00    


 


MCHC Differential  34.4 pg (28.0-36.0)   07/24/18  06:00    


 


RDW  14.8 % (11.5-20.0)   07/24/18  06:00    


 


Plt Count  219 Th/cmm (150-400)   07/24/18  06:00    


 


MPV  7.3 fl  07/24/18  06:00    


 


Neutrophils %  62.8 % (40.0-80.0)   07/24/18  06:00    


 


Lymphocytes %  21.5 % (20.0-50.0)   07/24/18  06:00    


 


Monocytes %  12.5 % (2.0-10.0)  H  07/24/18  06:00    


 


Eosinophils %  2.7 % (0.0-5.0)   07/24/18  06:00    


 


Basophils %  0.5 % (0.0-2.0)   07/24/18  06:00    


 


Sodium  136 mEq/L (136-145)   07/24/18  06:00    


 


Potassium  3.8 mEq/L (3.5-5.1)   07/24/18  06:00    


 


Chloride  103 mEq/L ()   07/24/18  06:00    


 


Carbon Dioxide  27.1 mEq/L (21.0-31.0)   07/24/18  06:00    


 


Anion Gap  9.7  (7.0-16.0)   07/24/18  06:00    


 


BUN  9 mg/dL (7-25)   07/24/18  06:00    


 


Creatinine  0.7 mg/dL (0.7-1.3)   07/24/18  06:00    


 


Est GFR ( Amer)  > 60.0 ml/min (>90)   07/24/18  06:00    


 


Est GFR (Non-Af Amer)  > 60.0 ml/min  07/24/18  06:00    


 


BUN/Creatinine Ratio  12.9   07/24/18  06:00    


 


Glucose  77 mg/dL ()   07/24/18  06:00    


 


Calcium  9.1 mg/dL (8.6-10.3)   07/24/18  06:00    


 


Phosphorus  3.8 mg/dL (2.5-5.0)   07/21/18  03:30    


 


Magnesium  2.4 mg/dL (1.9-2.7)   07/21/18  03:30    


 


Total Bilirubin  0.2 mg/dL (0.3-1.0)  L  07/24/18  06:00    


 


AST  21 U/L (13-39)   07/24/18  06:00    


 


ALT  18 U/L (7-52)   07/24/18  06:00    


 


Alkaline Phosphatase  90 U/L ()   07/24/18  06:00    


 


Ammonia  53 umol/L (16-53)   07/22/18  05:06    


 


Troponin I  < 0.01 ng/mL (0.01-0.05)  L  07/21/18  03:30    


 


B-Natriuretic Peptide  87.0 pg/mL (5.0-100.0)   07/22/18  05:06    


 


Total Protein  6.5 gm/dL (6.0-8.3)   07/24/18  06:00    


 


Albumin  3.9 gm/dL (4.2-5.5)  L  07/24/18  06:00    


 


Globulin  2.6 gm/dL  07/24/18  06:00    


 


Albumin/Globulin Ratio  1.5  (1.0-1.8)   07/24/18  06:00    


 


Amylase  63 U/L ()   07/21/18  03:30    


 


Lipase  42 U/L (11-82)   07/21/18  03:30    


 


Carcinoembryonic Ag  3.2 ng/mL (0.0-4.7)   07/22/18  05:06    


 


Urine Source  CLEAN C   07/22/18  03:44    


 


Urine Color  BROWN   07/22/18  03:44    


 


Urine Clarity  CLEAR  (CLEAR)   07/22/18  03:44    


 


Urine pH  5.5  (4.6 - 8.0)   07/22/18  03:44    


 


Ur Specific Gravity  1.025  (1.005-1.030)   07/22/18  03:44    


 


Urine Protein  TRACE mg/dL (NEGATIVE)   07/22/18  03:44    


 


Urine Glucose (UA)  NEGATIVE mg/dL (NEGATIVE)   07/22/18  03:44    


 


Urine Ketones  TRACE mg/dL (NEGATIVE)   07/22/18  03:44    


 


Urine Blood  NEGATIVE  (NEGATIVE)   07/22/18  03:44    


 


Urine Nitrate  NEGATIVE  (NEGATIVE)   07/22/18  03:44    


 


Urine Bilirubin  NEGATIVE  (NEGATIVE)   07/22/18  03:44    


 


Urine Urobilinogen  0.2 E.U./dL (0.2 - 1.0)   07/22/18  03:44    


 


Ur Leukocyte Esterase  NEGATIVE  (NEGATIVE)   07/22/18  03:44    


 


Urine RBC  NONE SEEN /hpf (0-5)   07/22/18  03:44    


 


Urine WBC  NONE SEEN /hpf (0-5)   07/22/18  03:44    


 


Ur Epithelial Cells  NONE SEEN /lpf (FEW)   07/22/18  03:44    


 


Urine Bacteria  NONE SEEN /hpf (NONE SEEN)   07/22/18  03:44    


 


Blood Type  O POSITIVE   07/21/18  03:30    


 


Antibody Screen  NEGATIVE   07/21/18  03:30    














- Physical Exam


Vitals and I&O: 


 Vital Signs











Temp  98.3 F   07/27/18 03:00


 


Pulse  78   07/27/18 08:46


 


Resp  18   07/27/18 03:00


 


BP  118/76   07/27/18 03:00


 


Pulse Ox  96   07/27/18 03:00








 Intake & Output











 07/26/18 07/27/18 07/27/18





 18:59 06:59 18:59


 


Intake Total 1500 400 


 


Output Total  2 


 


Balance 1500 398 


 


Weight (lbs) 61.235 kg 61.235 kg 


 


Intake:   


 


  Oral 1500 400 


 


Output:   


 


  Urine  2 


 


Other:   


 


  # Voids 4 3 


 


  # Bowel Movements 1  


 


  Weight Source Estimated Bedscale 











Active Medications: 


Current Medications





Albuterol/Ipratropium (Duoneb Neb)  3 ml HHN Q2H PRN


   PRN Reason: Wheezing


   Stop: 09/19/18 14:09


Amiodarone HCl (Cordarone)  200 mg PO DAILY [facility]


   Stop: 09/19/18 08:59


   Last Admin: 07/27/18 08:46 Dose:  200 mg


Cyanocobalamin (Vitamin B12)  1,000 mcg PO DAILY [facility]


   Stop: 09/19/18 08:59


   Last Admin: 07/27/18 08:45 Dose:  1,000 mcg


Diltiazem HCl (Cardizem)  60 mg PO DAILY [facility]


   Stop: 09/19/18 08:59


   Last Admin: 07/26/18 09:55 Dose:  60 mg


Divalproex Sodium (Depakote Dr)  500 mg PO BID [facility]; Protocol


   Stop: 09/19/18 08:59


   Last Admin: 07/27/18 08:46 Dose:  500 mg


Docusate Sodium (Colace)  100 mg PO BID PRN


   PRN Reason: CONSTIPATION


   Stop: 09/19/18 08:47


Ferrous Sulfate (Iron)  325 mg PO DAILY [facility]


   Stop: 09/19/18 08:59


   Last Admin: 07/27/18 08:46 Dose:  325 mg


Gabapentin (Neurontin)  300 mg PO TID [facility]


   Stop: 09/19/18 08:59


   Last Admin: 07/27/18 08:46 Dose:  300 mg


Lorazepam (Ativan)  1 mg PO Q4HR PRN; Protocol


   PRN Reason: Agitation


   Stop: 09/24/18 18:34


   Last Admin: 07/27/18 08:55 Dose:  1 mg


Lorazepam (Ativan)  2 mg IVP Q6HR PRN; Protocol


   PRN Reason: Agitation


   Stop: 09/19/18 12:32


Mirtazapine (Remeron)  15 mg PO HS [facility]; Protocol


   Stop: 09/19/18 20:59


   Last Admin: 07/26/18 22:53 Dose:  Not Given


Multivitamins/Vitamin C (Theragran)  1 tab PO DAILY [facility]


   Stop: 09/19/18 08:59


   Last Admin: 07/27/18 08:46 Dose:  1 tab


Olanzapine (Zyprexa)  10 mg PO HS [facility]


   Stop: 09/19/18 20:59


   Last Admin: 07/26/18 22:53 Dose:  Not Given


Olanzapine (Zyprexa)  5 mg PO DAILY [facility]; Protocol


   Stop: 09/25/18 08:59


   Last Admin: 07/27/18 08:46 Dose:  5 mg


Ondansetron HCl (Zofran)  4 mg IV Q6H PRN


   PRN Reason: Nausea / Vomiting


   Stop: 09/20/18 19:24


Oxybutynin Chloride (Ditropan)  5 mg PO HS [facility]


   Stop: 09/19/18 20:59


   Last Admin: 07/26/18 22:53 Dose:  Not Given


Pantoprazole Sodium (Protonix)  40 mg PO BID [facility]


   Stop: 09/25/18 08:59


Sodium Chloride (Nacl Tab)  1 gm PO BID [facility]


   Stop: 09/19/18 08:59


   Last Admin: 07/27/18 08:47 Dose:  1 gm


Tamsulosin HCl (Flomax)  0.4 mg PO DAILY [facility]


   Stop: 09/19/18 08:59


   Last Admin: 07/27/18 08:46 Dose:  0.4 mg








General: weak, lethargic, congested


HEENT: NC/AT, PERRLA, EOMI, anicteric sclerae, throat clear


Neck: Supple, No JVD, No thyromegaly


Lungs: wheezing, ronchi


Cardiovascular: RRR, Normal S1


Abdomen: soft, non-tender, non-distended, positive bowel sound


Extremities: clear


Neurological: no change





Internal Medicine Assmt/Plan





- Assessment


Assessment: 


BL early PNA: IVPB ABX.





ALOC: multifactorial.





h/o A. Fib: rate controlled.





Acute UGIB in [facility]: observe; GI consultation.





Noncompliance: education provided.





Psychosis: continue meds.





DVT prophylaxis.
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
## 2018-07-27 NOTE — INTERNAL MEDICINE PROG NOTE
Internal Medicine Subjective





- Subjective


Service Date: 07/27/18


Patient seen and examined:: with staff


Patient is:: awake, verbal, interactive, in bed, confused


Patient Complaints of:: congestion


Per staff patient has:: no adverse event





Internal Medicine Objective





- Results


Result Diagrams: 


 07/24/18 06:00





 07/24/18 06:00


Recent Labs: 


 Laboratory Last Values











WBC  6.1 Th/cmm (4.8-10.8)   07/24/18  06:00    


 


RBC  3.59 Mil/cmm (3.80-5.80)  L  07/24/18  06:00    


 


Hgb  11.5 gm/dL (12-16)  L  07/24/18  06:00    


 


Hct  33.6 % (41.0-60)  L  07/24/18  06:00    


 


MCV  93.6 fl (80-99)   07/24/18  06:00    


 


MCH  32.2 pg (27.0-31.0)  H  07/24/18  06:00    


 


MCHC Differential  34.4 pg (28.0-36.0)   07/24/18  06:00    


 


RDW  14.8 % (11.5-20.0)   07/24/18  06:00    


 


Plt Count  219 Th/cmm (150-400)   07/24/18  06:00    


 


MPV  7.3 fl  07/24/18  06:00    


 


Neutrophils %  62.8 % (40.0-80.0)   07/24/18  06:00    


 


Lymphocytes %  21.5 % (20.0-50.0)   07/24/18  06:00    


 


Monocytes %  12.5 % (2.0-10.0)  H  07/24/18  06:00    


 


Eosinophils %  2.7 % (0.0-5.0)   07/24/18  06:00    


 


Basophils %  0.5 % (0.0-2.0)   07/24/18  06:00    


 


Sodium  136 mEq/L (136-145)   07/24/18  06:00    


 


Potassium  3.8 mEq/L (3.5-5.1)   07/24/18  06:00    


 


Chloride  103 mEq/L ()   07/24/18  06:00    


 


Carbon Dioxide  27.1 mEq/L (21.0-31.0)   07/24/18  06:00    


 


Anion Gap  9.7  (7.0-16.0)   07/24/18  06:00    


 


BUN  9 mg/dL (7-25)   07/24/18  06:00    


 


Creatinine  0.7 mg/dL (0.7-1.3)   07/24/18  06:00    


 


Est GFR ( Amer)  > 60.0 ml/min (>90)   07/24/18  06:00    


 


Est GFR (Non-Af Amer)  > 60.0 ml/min  07/24/18  06:00    


 


BUN/Creatinine Ratio  12.9   07/24/18  06:00    


 


Glucose  77 mg/dL ()   07/24/18  06:00    


 


Calcium  9.1 mg/dL (8.6-10.3)   07/24/18  06:00    


 


Phosphorus  3.8 mg/dL (2.5-5.0)   07/21/18  03:30    


 


Magnesium  2.4 mg/dL (1.9-2.7)   07/21/18  03:30    


 


Total Bilirubin  0.2 mg/dL (0.3-1.0)  L  07/24/18  06:00    


 


AST  21 U/L (13-39)   07/24/18  06:00    


 


ALT  18 U/L (7-52)   07/24/18  06:00    


 


Alkaline Phosphatase  90 U/L ()   07/24/18  06:00    


 


Ammonia  53 umol/L (16-53)   07/22/18  05:06    


 


Troponin I  < 0.01 ng/mL (0.01-0.05)  L  07/21/18  03:30    


 


B-Natriuretic Peptide  87.0 pg/mL (5.0-100.0)   07/22/18  05:06    


 


Total Protein  6.5 gm/dL (6.0-8.3)   07/24/18  06:00    


 


Albumin  3.9 gm/dL (4.2-5.5)  L  07/24/18  06:00    


 


Globulin  2.6 gm/dL  07/24/18  06:00    


 


Albumin/Globulin Ratio  1.5  (1.0-1.8)   07/24/18  06:00    


 


Amylase  63 U/L ()   07/21/18  03:30    


 


Lipase  42 U/L (11-82)   07/21/18  03:30    


 


Carcinoembryonic Ag  3.2 ng/mL (0.0-4.7)   07/22/18  05:06    


 


Urine Source  CLEAN C   07/22/18  03:44    


 


Urine Color  BROWN   07/22/18  03:44    


 


Urine Clarity  CLEAR  (CLEAR)   07/22/18  03:44    


 


Urine pH  5.5  (4.6 - 8.0)   07/22/18  03:44    


 


Ur Specific Gravity  1.025  (1.005-1.030)   07/22/18  03:44    


 


Urine Protein  TRACE mg/dL (NEGATIVE)   07/22/18  03:44    


 


Urine Glucose (UA)  NEGATIVE mg/dL (NEGATIVE)   07/22/18  03:44    


 


Urine Ketones  TRACE mg/dL (NEGATIVE)   07/22/18  03:44    


 


Urine Blood  NEGATIVE  (NEGATIVE)   07/22/18  03:44    


 


Urine Nitrate  NEGATIVE  (NEGATIVE)   07/22/18  03:44    


 


Urine Bilirubin  NEGATIVE  (NEGATIVE)   07/22/18  03:44    


 


Urine Urobilinogen  0.2 E.U./dL (0.2 - 1.0)   07/22/18  03:44    


 


Ur Leukocyte Esterase  NEGATIVE  (NEGATIVE)   07/22/18  03:44    


 


Urine RBC  NONE SEEN /hpf (0-5)   07/22/18  03:44    


 


Urine WBC  NONE SEEN /hpf (0-5)   07/22/18  03:44    


 


Ur Epithelial Cells  NONE SEEN /lpf (FEW)   07/22/18  03:44    


 


Urine Bacteria  NONE SEEN /hpf (NONE SEEN)   07/22/18  03:44    


 


Valproic Acid  25.5 ug/mL (50.0-100.0)  L  07/27/18  05:42    


 


Blood Type  O POSITIVE   07/21/18  03:30    


 


Antibody Screen  NEGATIVE   07/21/18  03:30    














- Physical Exam


Vitals and I&O: 


 Vital Signs











Temp  98.9 F   07/27/18 20:00


 


Pulse  102   07/27/18 20:00


 


Resp  18   07/27/18 20:00


 


BP  115/57   07/27/18 20:00


 


Pulse Ox  93   07/27/18 20:00








 Intake & Output











 07/27/18 07/27/18 07/28/18





 06:59 18:59 06:59


 


Intake Total 400 1000 


 


Output Total 2  


 


Balance 398 1000 


 


Weight (lbs) 61.235 kg 61.235 kg 


 


Intake:   


 


  Oral 400 1000 


 


Output:   


 


  Urine 2  


 


Other:   


 


  # Voids 3 2 


 


  # Bowel Movements  1 


 


  Weight Source Bedscale Bedscale 











Active Medications: 


Current Medications





Albuterol/Ipratropium (Duoneb Neb)  3 ml HHN Q2H PRN


   PRN Reason: Wheezing


   Stop: 09/19/18 14:09


Amiodarone HCl (Cordarone)  200 mg PO DAILY [facility]


   Stop: 09/19/18 08:59


   Last Admin: 07/27/18 08:46 Dose:  200 mg


Cyanocobalamin (Vitamin B12)  1,000 mcg PO DAILY [facility]


   Stop: 09/19/18 08:59


   Last Admin: 07/27/18 08:45 Dose:  1,000 mcg


Diltiazem HCl (Cardizem)  60 mg PO DAILY [facility]


   Stop: 09/19/18 08:59


   Last Admin: 07/27/18 09:37 Dose:  60 mg


Divalproex Sodium (Depakote Dr)  500 mg PO BID [facility]; Protocol


   Stop: 09/19/18 08:59


   Last Admin: 07/27/18 17:53 Dose:  500 mg


Docusate Sodium (Colace)  100 mg PO BID PRN


   PRN Reason: CONSTIPATION


   Stop: 09/19/18 08:47


Ferrous Sulfate (Iron)  325 mg PO DAILY [facility]


   Stop: 09/19/18 08:59


   Last Admin: 07/27/18 08:46 Dose:  325 mg


Gabapentin (Neurontin)  300 mg PO TID [facility]


   Stop: 09/19/18 08:59


   Last Admin: 07/27/18 22:15 Dose:  Not Given


Lorazepam (Ativan)  1 mg PO Q4HR PRN; Protocol


   PRN Reason: Agitation


   Stop: 09/24/18 18:34


   Last Admin: 07/27/18 13:40 Dose:  1 mg


Lorazepam (Ativan)  2 mg IVP Q6HR PRN; Protocol


   PRN Reason: Agitation


   Stop: 09/19/18 12:32


Mirtazapine (Remeron)  15 mg PO HS [facility]; Protocol


   Stop: 09/19/18 20:59


   Last Admin: 07/27/18 22:15 Dose:  Not Given


Multivitamins/Vitamin C (Theragran)  1 tab PO DAILY [facility]


   Stop: 09/19/18 08:59


   Last Admin: 07/27/18 08:46 Dose:  1 tab


Olanzapine (Zyprexa)  10 mg PO HS [facility]


   Stop: 09/19/18 20:59


   Last Admin: 07/27/18 22:15 Dose:  Not Given


Olanzapine (Zyprexa)  5 mg PO DAILY [facility]; Protocol


   Stop: 09/25/18 08:59


   Last Admin: 07/27/18 08:46 Dose:  5 mg


Ondansetron HCl (Zofran)  4 mg IV Q6H PRN


   PRN Reason: Nausea / Vomiting


   Stop: 09/20/18 19:24


Oxybutynin Chloride (Ditropan)  5 mg PO HS [facility]


   Stop: 09/19/18 20:59


   Last Admin: 07/27/18 22:15 Dose:  Not Given


Pantoprazole Sodium (Protonix)  40 mg PO BID [facility]


   Stop: 09/25/18 08:59


   Last Admin: 07/27/18 17:53 Dose:  40 mg


Sodium Chloride (Nacl Tab)  1 gm PO BID [facility]


   Stop: 09/19/18 08:59


   Last Admin: 07/27/18 17:53 Dose:  1 gm


Tamsulosin HCl (Flomax)  0.4 mg PO DAILY [facility]


   Stop: 09/19/18 08:59


   Last Admin: 07/27/18 08:46 Dose:  0.4 mg








General: weak, lethargic, congested


HEENT: NC/AT, PERRLA, EOMI, anicteric sclerae, throat clear


Neck: Supple, No JVD, No thyromegaly


Lungs: wheezing, ronchi


Cardiovascular: RRR, Normal S1


Abdomen: soft, non-tender, non-distended, positive bowel sound


Extremities: clear


Neurological: no change





Internal Medicine Assmt/Plan





- Assessment


Assessment: 


ALOC: multifactorial.





Agitation: sitter 1x1





BL early PNA: IVPB ABX.





h/o A. Fib: rate controlled.





Acute UGIB in SNF: observe; GI consultation.





Noncompliance: education provided.





Psychosis: continue meds.





DVT prophylaxis.

## 2018-07-28 LAB
AMPHET UR-MCNC: NEGATIVE NG/ML
ANION GAP SERPL CALC-SCNC: 14.6 MMOL/L (ref 7–16)
APPEARANCE UR: CLEAR
BARBITURATES UR-MCNC: NEGATIVE UG/ML
BASOPHILS NFR BLD: 0 % (ref 0–3)
BENZODIAZEPINES PNL UR: POSITIVE
BILIRUB UR-MCNC: NEGATIVE MG/DL
BUN SERPL-MCNC: 11 MG/DL (ref 7–25)
CALCIUM SERPL-MCNC: 9.2 MG/DL (ref 8.6–10.3)
CANNABINOIDS SERPL QL CFM: NEGATIVE
CHLORIDE SERPL-SCNC: 102 MEQ/L (ref 98–107)
CO2 SERPL-SCNC: 19.5 MEQ/L (ref 21–31)
COCAINE METAB.OTHER UR-MCNC: NEGATIVE NG/ML
COLOR UR: YELLOW
CREAT SERPL-MCNC: 0.7 MG/DL (ref 0.7–1.3)
EOSINOPHIL NFR BLD: 0 % (ref 0–5)
ERYTHROCYTE [DISTWIDTH] IN BLOOD BY AUTOMATED COUNT: 15 % (ref 11.5–20)
GLUCOSE SERPL-MCNC: 112 MG/DL (ref 70–105)
GLUCOSE UR STRIP-MCNC: NEGATIVE MG/DL
HCT VFR BLD CALC: 38.3 % (ref 41–60)
HGB BLD-MCNC: 12.9 GM/DL (ref 12–16)
KETONES UR STRIP-MCNC: NEGATIVE MG/DL
LEUKOCYTE ESTERASE UR-ACNC: NEGATIVE
LYMPHOCYTES # BLD MANUAL: 15 % (ref 20–50)
MCH RBC QN AUTO: 31.3 PG (ref 27–31)
MCHC RBC AUTO-ENTMCNC: 33.6 PG (ref 28–36)
MCV RBC AUTO: 93.1 FL (ref 80–99)
METHADONE UR CFM-MCNC: NEGATIVE NG/ML
METHAMPHET UR QL: NEGATIVE
MICRO URNS: NO
MONOCYTES # BLD MANUAL: 8 % (ref 2–10)
NEUTROPHILS NFR BLD AUTO: 75 % (ref 40–80)
NEUTS BAND NFR BLD: 2 % (ref 0–10)
NITRITE UR QL STRIP: NEGATIVE
OPIATES UR QL: NEGATIVE
PCP UR-MCNC: NEGATIVE UG/L
PH UR STRIP: 7 [PH] (ref 4.6–8)
PLATELET # BLD: 309 TH/CMM (ref 150–400)
PMV BLD AUTO: 7.3 FL
POTASSIUM SERPL-SCNC: 4.1 MEQ/L (ref 3.5–5.1)
PROT UR STRIP-MCNC: NEGATIVE MG/DL
RBC # BLD AUTO: 4.11 MIL/CMM (ref 3.8–5.8)
RBC # UR STRIP: NEGATIVE /UL
SODIUM SERPL-SCNC: 132 MEQ/L (ref 136–145)
SP GR UR STRIP: 1.01 (ref 1–1.03)
TRICYCLICS UR QL: NEGATIVE
URINALYSIS COMPLETE PNL UR: (no result)
UROBILINOGEN UR STRIP-ACNC: 0.2 E.U./DL (ref 0.2–1)
WBC # BLD AUTO: 18.7 TH/CMM (ref 4.8–10.8)

## 2018-07-28 RX ADMIN — DILTIAZEM HYDROCHLORIDE SCH MG: 30 TABLET, FILM COATED ORAL at 08:50

## 2018-07-28 RX ADMIN — PANTOPRAZOLE SODIUM SCH MG: 40 TABLET, DELAYED RELEASE ORAL at 17:08

## 2018-07-28 RX ADMIN — PANTOPRAZOLE SODIUM SCH MG: 40 TABLET, DELAYED RELEASE ORAL at 08:50

## 2018-07-28 NOTE — DIAGNOSTIC IMAGING REPORT
Portable chest x-ray



HISTORY: Shortness of breath, leukocytosis



There is a poor inspiration.  The heart appears enlarged with left

ventricular prominence.  Atherosclerotic calcination seen in the aorta. 

There is generalized accentuation of the interstitial lung markings. 

However, no focal processes are seen.



IMPRESSION:

1.  Poor inspiration with accentuation of the interstitial lung

markings.  However, no definite focal processes are seen.

2.  Cardiomegaly

## 2018-07-28 NOTE — INTERNAL MEDICINE PROG NOTE
Internal Medicine Subjective





- Subjective


Service Date: 18


Patient seen and examined:: with staff


Patient is:: awake, verbal, interactive, in bed, confused


Patient Complaints of:: congestion


Per staff patient has:: no adverse event





Internal Medicine Objective





- Results


Result Diagrams: 


 18 07:15





 18 07:15


Recent Labs: 


 Laboratory Last Values











WBC  18.7 Th/cmm (4.8-10.8)  H  18  07:15    


 


RBC  4.11 Mil/cmm (3.80-5.80)   18  07:15    


 


Hgb  12.9 gm/dL (12-16)   18  07:15    


 


Hct  38.3 % (41.0-60)  L  18  07:15    


 


MCV  93.1 fl (80-99)   18  07:15    


 


MCH  31.3 pg (27.0-31.0)  H  18  07:15    


 


MCHC Differential  33.6 pg (28.0-36.0)   18  07:15    


 


RDW  15.0 % (11.5-20.0)   18  07:15    


 


Plt Count  309 Th/cmm (150-400)   18  07:15    


 


MPV  7.3 fl  18  07:15    


 


Add Manual Diff  YES   18  07:15    


 


Neutrophils %  62.8 % (40.0-80.0)   18  06:00    


 


Band Neutrophils %  2 % (0-10)   18  07:15    


 


Lymphocytes %  21.5 % (20.0-50.0)   18  06:00    


 


Monocytes %  12.5 % (2.0-10.0)  H  18  06:00    


 


Eosinophils %  2.7 % (0.0-5.0)   18  06:00    


 


Basophils %  0.5 % (0.0-2.0)   18  06:00    


 


Neutrophils (Manual)  75 % (40-80)   18  07:15    


 


Lymphocytes  15 % (20-50)  L  18  07:15    


 


Monocytes  8 % (2-10)   18  07:15    


 


Eosinophils  0 % (0-5)   18  07:15    


 


Basophils  0 % (0-3)   18  07:15    


 


Sodium  132 mEq/L (136-145)  L  18  07:15    


 


Potassium  4.1 mEq/L (3.5-5.1)   18  07:15    


 


Chloride  102 mEq/L ()   18  07:15    


 


Carbon Dioxide  19.5 mEq/L (21.0-31.0)  L  18  07:15    


 


Anion Gap  14.6  (7.0-16.0)   18  07:15    


 


BUN  11 mg/dL (7-25)   18  07:15    


 


Creatinine  0.7 mg/dL (0.7-1.3)   18  07:15    


 


Est GFR ( Amer)  > 60.0 ml/min (>90)   18  07:15    


 


Est GFR (Non-Af Amer)  > 60.0 ml/min  18  07:15    


 


BUN/Creatinine Ratio  15.7   18  07:15    


 


Glucose  112 mg/dL ()  H  18  07:15    


 


Calcium  9.2 mg/dL (8.6-10.3)   18  07:15    


 


Phosphorus  3.8 mg/dL (2.5-5.0)   18  03:30    


 


Magnesium  2.4 mg/dL (1.9-2.7)   18  03:30    


 


Total Bilirubin  0.2 mg/dL (0.3-1.0)  L  18  06:00    


 


AST  21 U/L (13-39)   18  06:00    


 


ALT  18 U/L (7-52)   18  06:00    


 


Alkaline Phosphatase  90 U/L ()   18  06:00    


 


Ammonia  53 umol/L (16-53)   18  05:06    


 


Troponin I  < 0.01 ng/mL (0.01-0.05)  L  18  03:30    


 


B-Natriuretic Peptide  87.0 pg/mL (5.0-100.0)   18  05:06    


 


Total Protein  6.5 gm/dL (6.0-8.3)   18  06:00    


 


Albumin  3.9 gm/dL (4.2-5.5)  L  18  06:00    


 


Globulin  2.6 gm/dL  18  06:00    


 


Albumin/Globulin Ratio  1.5  (1.0-1.8)   18  06:00    


 


Amylase  63 U/L ()   18  03:30    


 


Lipase  42 U/L (11-82)   18  03:30    


 


Carcinoembryonic Ag  3.2 ng/mL (0.0-4.7)   18  05:06    


 


Urine Source  MIDSTREAM   18  10:00    


 


Urine Color  YELLOW   18  10:00    


 


Urine Clarity  CLEAR  (CLEAR)   18  10:00    


 


Urine pH  7.0  (4.6 - 8.0)   18  10:00    


 


Ur Specific Gravity  1.015  (1.005-1.030)   18  10:00    


 


Urine Protein  NEGATIVE mg/dL (NEGATIVE)   18  10:00    


 


Urine Glucose (UA)  NEGATIVE mg/dL (NEGATIVE)   18  10:00    


 


Urine Ketones  NEGATIVE mg/dL (NEGATIVE)   18  10:00    


 


Urine Blood  NEGATIVE  (NEGATIVE)   18  10:00    


 


Urine Nitrate  NEGATIVE  (NEGATIVE)   18  10:00    


 


Urine Bilirubin  NEGATIVE  (NEGATIVE)   18  10:00    


 


Urine Urobilinogen  0.2 E.U./dL (0.2 - 1.0)   18  10:00    


 


Ur Leukocyte Esterase  NEGATIVE  (NEGATIVE)   18  10:00    


 


Urine RBC  NONE SEEN /hpf (0-5)   18  03:44    


 


Urine WBC  NONE SEEN /hpf (0-5)   18  03:44    


 


Ur Epithelial Cells  NONE SEEN /lpf (FEW)   18  03:44    


 


Urine Bacteria  NONE SEEN /hpf (NONE SEEN)   18  03:44    


 


Urine Opiates Screen  NEGATIVE  (NEGATIVE)   18  10:00    


 


Urine Methadone Screen  NEGATIVE  (NEGATIVE)   18  10:00    


 


Ur Barbiturates Screen  NEGATIVE  (NEGATIVE)   18  10:00    


 


Valproic Acid  25.5 ug/mL (50.0-100.0)  L  18  05:42    


 


Ur Tricyclics Screen  NEGATIVE  (NEGATIVE)   18  10:00    


 


Ur Phencyclidine Scrn  NEGATIVE  (NEGATIVE)   18  10:00    


 


Amphetamines Screen  NEGATIVE  (NEGATIVE)   18  10:00    


 


U Methamphetamines Scrn  NEGATIVE  (NEGATIVE)   18  10:00    


 


U Benzodiazepines Scrn  POSITIVE  (NEGATIVE)  H  18  10:00    


 


U Cocaine Metab Screen  NEGATIVE  (NEGATIVE)   18  10:00    


 


U Cannabinoids Screen  NEGATIVE  (NEGATIVE)   18  10:00    


 


Blood Type  O POSITIVE   18  03:30    


 


Antibody Screen  NEGATIVE   18  03:30    














- Physical Exam


Vitals and I&O: 


 Vital Signs











Temp  98.0 F   18 20:00


 


Pulse  77   18 20:00


 


Resp  20   18 20:00


 


BP  103/57   18 20:00


 


Pulse Ox  97   18 20:00








 Intake & Output











 18





 06:59 18:59 06:59


 


Intake Total 300 800 


 


Balance 300 800 


 


Weight (lbs) 61.235 kg 61.235 kg 


 


Intake:   


 


  Oral 300 800 


 


Other:   


 


  # Voids 3 4 


 


  # Bowel Movements 1 1 


 


  Weight Source Bedscale Bedscale 











Active Medications: 


Current Medications





Albuterol/Ipratropium (Duoneb Neb)  3 ml HHN Q2H PRN


   PRN Reason: Wheezing


   Stop: 18 14:09


Amiodarone HCl (Cordarone)  200 mg PO DAILY [facility]


   Stop: 18 08:59


   Last Admin: 18 08:50 Dose:  200 mg


Amoxicillin/Clavulanate Potassium (Augmentin 875-125mg)  1 tab PO BIDBRS [facility]


   Stop: 18 17:59


   Last Admin: 18 17:08 Dose:  1 tab


Cyanocobalamin (Vitamin B12)  1,000 mcg PO DAILY [facility]


   Stop: 18 08:59


   Last Admin: 18 08:50 Dose:  1,000 mcg


Diltiazem HCl (Cardizem)  60 mg PO DAILY [facility]


   Stop: 18 08:59


   Last Admin: 18 08:50 Dose:  60 mg


Divalproex Sodium (Depakote Dr)  500 mg PO BID [facility]; Protocol


   Stop: 18 08:59


   Last Admin: 18 17:08 Dose:  500 mg


Docusate Sodium (Colace)  100 mg PO BID PRN


   PRN Reason: CONSTIPATION


   Stop: 18 08:47


Ferrous Sulfate (Iron)  325 mg PO DAILY [facility]


   Stop: 18 08:59


   Last Admin: 18 08:49 Dose:  325 mg


Gabapentin (Neurontin)  300 mg PO TID [facility]


   Stop: 18 08:59


   Last Admin: 18 20:39 Dose:  300 mg


Lorazepam (Ativan)  1 mg PO Q4HR PRN; Protocol


   PRN Reason: Agitation


   Stop: 18 18:34


   Last Admin: 18 20:39 Dose:  1 mg


Lorazepam (Ativan)  2 mg IVP Q6HR PRN; Protocol


   PRN Reason: Agitation


   Stop: 18 12:32


Mirtazapine (Remeron)  15 mg PO HS [facility]; Protocol


   Stop: 18 20:59


   Last Admin: 18 20:39 Dose:  15 mg


Multivitamins/Vitamin C (Theragran)  1 tab PO DAILY [facility]


   Stop: 18 08:59


   Last Admin: 18 08:49 Dose:  1 tab


Olanzapine (Zyprexa)  10 mg PO HS [facility]


   Stop: 18 20:59


   Last Admin: 18 20:39 Dose:  10 mg


Olanzapine (Zyprexa)  5 mg PO DAILY [facility]; Protocol


   Stop: 18 08:59


   Last Admin: 18 08:50 Dose:  5 mg


Ondansetron HCl (Zofran)  4 mg IV Q6H PRN


   PRN Reason: Nausea / Vomiting


   Stop: 18 19:24


Oxybutynin Chloride (Ditropan)  5 mg PO HS [facility]


   Stop: 18 20:59


   Last Admin: 18 20:39 Dose:  5 mg


Pantoprazole Sodium (Protonix)  40 mg PO BID [facility]


   Stop: 18 08:59


   Last Admin: 18 17:08 Dose:  40 mg


Sodium Chloride (Nacl Tab)  1 gm PO BID [facility]


   Stop: 18 08:59


   Last Admin: 18 17:08 Dose:  1 gm


Tamsulosin HCl (Flomax)  0.4 mg PO DAILY [facility]


   Stop: 18 08:59


   Last Admin: 18 08:49 Dose:  0.4 mg








General: weak, lethargic, congested


HEENT: NC/AT, PERRLA, EOMI, anicteric sclerae, throat clear


Neck: Supple, No JVD, No thyromegaly


Lungs: wheezing, ronchi


Cardiovascular: RRR, Normal S1


Abdomen: soft, non-tender, non-distended, positive bowel sound


Extremities: clear


Neurological: no change





Internal Medicine Assmt/Plan





- Assessment


Assessment: 


Acute leukocytosis: work up in progress.





ALOC: multifactorial.





Agitation: sitter 1x1





BL early PNA: IVPB ABX.





h/o A. Fib: rate controlled.





Acute UGIB in SNF: observe; GI consultation.





Noncompliance: education provided.





Psychosis: continue meds.





DVT prophylaxis.














Nutritional Asmnt/Malnutr-PDOC





- Dietary Evaluation


Malnutrition Findings (Please click <Entered> for more info): 








Nutritional Asmnt/Malnutrition                             Start:  18 11:

51


Text:                                                      Status: Complete    

  


Freq:                                                                          

  


Protocol:                                                                      

  


 Document     18 11:51  ELIAS  (Rec: 18 11:56  ELIAS BUSH-

FNS1)


 Nutritional Asmnt/Malnutrition


     Patient General Information


      Diagnosis                                  Upper GI bleed


      Pertinent Medical Hx/Surgical Hx           COPD, PNA, A-Fib, CAD, MI, UTI


                                                 , BPH, difficulty walking 2/2


                                                 chronic pain syndrome


      Subjective Information                     Pt asleep at time of visit


      Current Diet Order/ Nutrition Support      soft/bland diet


      Pertinent Medications                      vit B12, colace, Fe, theragran


                                                 , zofran, NaCl tab


      Pertinent Labs                             : Na 132, K 4.1, Cl 102,


                                                 CO2 19.5, BUN 11, Cr 0.7, Ca 9


                                                 .2, glucose 112


     Nutritional Hx/Data


      Height                                     1.75 m


      Height (Calculated Centimeters)            175.3


      Current Weight (lbs)                       61.235 kg


      Weight (Calculated Kilograms)              61.2


      Weight (Calculated Grams)                  04927.0


      Body Mass Index (BMI)                      19.9


      Weight Status                              Approriate


     GI Symptoms


      Last BM                                    


      Cultural/Ethnic/Amish Belief           unknown


      Usual diet at home                         regular


      Skin Integrity/Comment:                    karla score 18


     Estimated Nutritional Goals


      BEE in Kcals:                              Using Current wt


      Calories/Kcals/Kg                          28-33kcals/kg


      Kcals Calculated                           1708-2013kcals/day


      Protein:                                   Using Current wt


      Protein g/k-1.3g/day


      Protein Calculated                         61-79g/day


      Fluid: ml                                  1708-2013ml/day (1ml/kcal)


     Nutritional Problem


      1. Problem


       Problem                                   No nutrition diagnosis at this


                                                 time


     Intervention/Recommendation


      Comments                                   Recommend continuing soft/


                                                 bland diet


     Expected Outcomes/Goals


      Expected Outcomes/Goals                    PO intake>75%

## 2018-07-29 ENCOUNTER — HOSPITAL ENCOUNTER (INPATIENT)
Dept: HOSPITAL 36 - GERO | Age: 68
LOS: 15 days | Discharge: ANOTHER HOSPITAL | DRG: 885 | End: 2018-08-13
Attending: PSYCHIATRY & NEUROLOGY | Admitting: PSYCHIATRY & NEUROLOGY
Payer: MEDICARE

## 2018-07-29 VITALS — DIASTOLIC BLOOD PRESSURE: 67 MMHG | SYSTOLIC BLOOD PRESSURE: 124 MMHG

## 2018-07-29 DIAGNOSIS — F25.0: Primary | ICD-10-CM

## 2018-07-29 DIAGNOSIS — D72.829: ICD-10-CM

## 2018-07-29 DIAGNOSIS — F41.9: ICD-10-CM

## 2018-07-29 DIAGNOSIS — F32.9: ICD-10-CM

## 2018-07-29 DIAGNOSIS — I25.2: ICD-10-CM

## 2018-07-29 DIAGNOSIS — I25.10: ICD-10-CM

## 2018-07-29 DIAGNOSIS — J44.9: ICD-10-CM

## 2018-07-29 DIAGNOSIS — N40.0: ICD-10-CM

## 2018-07-29 DIAGNOSIS — Z91.19: ICD-10-CM

## 2018-07-29 DIAGNOSIS — R45.1: ICD-10-CM

## 2018-07-29 DIAGNOSIS — I48.91: ICD-10-CM

## 2018-07-29 DIAGNOSIS — F23: ICD-10-CM

## 2018-07-29 DIAGNOSIS — J18.9: ICD-10-CM

## 2018-07-29 DIAGNOSIS — G47.00: ICD-10-CM

## 2018-07-29 DIAGNOSIS — R26.2: ICD-10-CM

## 2018-07-29 PROCEDURE — Z7610: HCPCS

## 2018-07-29 RX ADMIN — DILTIAZEM HYDROCHLORIDE SCH MG: 30 TABLET, FILM COATED ORAL at 09:09

## 2018-07-29 RX ADMIN — PANTOPRAZOLE SODIUM SCH MG: 40 TABLET, DELAYED RELEASE ORAL at 16:33

## 2018-07-29 RX ADMIN — AMOXICILLIN AND CLAVULANATE POTASSIUM SCH TAB: 875; 125 TABLET, FILM COATED ORAL at 17:02

## 2018-07-29 RX ADMIN — PANTOPRAZOLE SODIUM SCH MG: 40 TABLET, DELAYED RELEASE ORAL at 09:09

## 2018-07-29 NOTE — DISCHARGE SUMMARY
DATE OF DISCHARGE:  07/28/2018



FINAL DIAGNOSES:

1.  Acute gastrointestinal bleeding stopped and stabilized.

2.  Altered level of consciousness on and off.

3.  Acute psychosis.

4.  Acute leukocytosis, multifactorial.

5.  Noncompliance.



HOSPITAL COURSE:  The patient is a 68-year-old male admitted due to acute upper

GI bleeding and probable pneumonia and altered level of consciousness, etc.  The

patient received proper management with significant improvement.  He was

scheduled to be discharged once he developed acute psychosis and ambulance

refused to take him.  Psychiatrist consultation requested subsequently as a

result.  The patient's white count elevated significantly and he refused IVPB

antibiotics.  I think the antibiotic p.o. Augmentin 875 mg b.i.d.  The patient

is accepted to [facility] Unit.



DISCHARGE CONDITION:  Stable.



DISPOSITION:  [facility] Unit.



DISCHARGE MEDICATIONS:  Continue medication from here.



DIET:  Cardiac, soft diet.



ACTIVITY:  Bed rest with physical therapy.



Follow up:  Same day in [facility] Unit.





DD: 07/29/2018 22:09

DT: 07/29/2018 23:20

[facility]# [id number]  [id number]
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
## 2018-07-30 LAB
ANION GAP SERPL CALC-SCNC: 10.8 MMOL/L (ref 7–16)
BASOPHILS # BLD AUTO: 0.1 TH/CUMM (ref 0–0.2)
BASOPHILS NFR BLD AUTO: 0.8 % (ref 0–2)
BUN SERPL-MCNC: 14 MG/DL (ref 7–25)
CALCIUM SERPL-MCNC: 9.4 MG/DL (ref 8.6–10.3)
CHLORIDE SERPL-SCNC: 105 MEQ/L (ref 98–107)
CO2 SERPL-SCNC: 26.4 MEQ/L (ref 21–31)
CREAT SERPL-MCNC: 0.8 MG/DL (ref 0.7–1.3)
EOSINOPHIL # BLD AUTO: 0.1 TH/CMM (ref 0.1–0.4)
EOSINOPHIL NFR BLD AUTO: 1.7 % (ref 0–5)
ERYTHROCYTE [DISTWIDTH] IN BLOOD BY AUTOMATED COUNT: 14.9 % (ref 11.5–20)
GLUCOSE SERPL-MCNC: 87 MG/DL (ref 70–105)
HCT VFR BLD CALC: 38.5 % (ref 41–60)
HGB BLD-MCNC: 13 GM/DL (ref 12–16)
LYMPHOCYTE AB SER FC-ACNC: 2.7 TH/CMM (ref 1.5–3)
LYMPHOCYTES NFR BLD AUTO: 31.6 % (ref 20–50)
MCH RBC QN AUTO: 31.8 PG (ref 27–31)
MCHC RBC AUTO-ENTMCNC: 33.6 PG (ref 28–36)
MCV RBC AUTO: 94.5 FL (ref 80–99)
MONOCYTES # BLD AUTO: 0.9 TH/CMM (ref 0.3–1)
MONOCYTES NFR BLD AUTO: 10.9 % (ref 2–10)
NEUTROPHILS # BLD: 4.6 TH/CMM (ref 1.8–8)
NEUTROPHILS NFR BLD AUTO: 55 % (ref 40–80)
PLATELET # BLD: 303 TH/CMM (ref 150–400)
PMV BLD AUTO: 7.1 FL
POTASSIUM SERPL-SCNC: 4.2 MEQ/L (ref 3.5–5.1)
RBC # BLD AUTO: 4.08 MIL/CMM (ref 3.8–5.8)
SODIUM SERPL-SCNC: 138 MEQ/L (ref 136–145)
WBC # BLD AUTO: 8.4 TH/CMM (ref 4.8–10.8)

## 2018-07-30 RX ADMIN — AMOXICILLIN AND CLAVULANATE POTASSIUM SCH TAB: 875; 125 TABLET, FILM COATED ORAL at 08:23

## 2018-07-30 RX ADMIN — AMOXICILLIN AND CLAVULANATE POTASSIUM SCH TAB: 875; 125 TABLET, FILM COATED ORAL at 17:26

## 2018-07-30 RX ADMIN — PANTOPRAZOLE SODIUM SCH MG: 40 TABLET, DELAYED RELEASE ORAL at 08:24

## 2018-07-30 RX ADMIN — DILTIAZEM HYDROCHLORIDE SCH MG: 30 TABLET, FILM COATED ORAL at 08:25

## 2018-07-30 RX ADMIN — PANTOPRAZOLE SODIUM SCH MG: 40 TABLET, DELAYED RELEASE ORAL at 17:26

## 2018-07-30 NOTE — HISTORY & PHYSICAL
ADMIT DATE:  07/29/2018



CHIEF COMPLAINT:  Dizziness and confusion.



HISTORY OF PRESENT ILLNESS:  The patient is a 68-year-old male admitted to

Geropsych Unit of [facility] due to acute psychosis which

developed just few days ago prior to a planned discharge back to nursing home. 

The patient was recently admitted to telemetry floor of [facility] due to upper GI bleeding, also loss of consciousness, atrial

fibrillation, and/or pneumonia, etc.  The patient's symptoms improved and he was

planned to be discharged back to nursing home once he developed acute psychosis.

 Psychiatrist consultation requested at request of the patient, and the patient

was accepted to Geropsych unit.  The patient is more confused than his baseline

status and has apparent psychosis.  She required sitter.



PAST MEDICAL HISTORY:  Coronary artery disease status post MI, atrial

fibrillation, anemia, BPH, COPD, pneumonia, ____, psychosis, anxiety,

depression.



PAST SURGICAL HISTORY:  Denies significant past surgical history.



MEDICATIONS:  See medication reconciliation sheet.



ALLERGIES:  No known drug allergy.



FAMILY HISTORY:  Noncontributory.



SOCIAL HISTORY:  The patient smoked fever.  No history of alcohol or IV drug

abuse.



REVIEW OF SYSTEMS:  As per HPI.



PHYSICAL EXAMINATION:

GENERAL:  A well-developed, thin male in no acute distress.

SKIN:  Warm and dry.

VITAL SIGNS:  Basically stable.

HEENT:  Normocephalic and atraumatic.  Pupils equal, round, react to light and

accommodation.

CHEST:  Symmetrical.

LUNGS:  Few rhonchi appreciated.

CARDIAC:  Normal sinus rhythm.  S1, S2.

ABDOMEN:  Benign, soft, and nontender.

EXTREMITIES:  No clubbing, cyanosis, or edema.  ____ bilaterally appreciated 2+.

NEUROLOGICAL:  Unremarkable.



LABORATORY DATA:  ordered.



ASSESSMENT AND PLAN:

1.  Acute altered level of consciousness, multifactorial, we will observe

closely.

2.  Acute psychosis on chronic psychotic disorder.  Psychiatry consultation

appreciated.

3.  Leukocytosis.  The patient refused IVPB antibiotics and I had changed to

Augmentin 875 mg p.o. b.i.d.

4.  Pneumonia, improving.

5.  Status post recent upper gastrointestinal bleeding, stabilized.

6.  Atrial fibrillation with rate control.

7.  History of coronary artery disease, status post myocardial infarction.

8.  Difficulty walking:  Fall precaution and physical therapy.

4.  Deep venous thrombosis prophylaxis.





DD: 07/29/2018 22:04

DT: 07/30/2018 00:09

JOB# [id number]  [id number]

## 2018-07-30 NOTE — INTERNAL MEDICINE PROG NOTE
Internal Medicine Subjective





- Subjective


Service Date: 07/30/18


Patient seen and examined:: without staff


Patient is:: awake, non-interactive, in bed, agitated


Patient Complaints of:: unable to sleep


Per staff patient has:: no adverse event





Internal Medicine Objective





- Results


Result Diagrams: 


 07/30/18 07:55





 07/30/18 07:55


Recent Labs: 


 Laboratory Last Values











WBC  8.4 Th/cmm (4.8-10.8)   07/30/18  07:55    


 


RBC  4.08 Mil/cmm (3.80-5.80)   07/30/18  07:55    


 


Hgb  13.0 gm/dL (12-16)   07/30/18  07:55    


 


Hct  38.5 % (41.0-60)  L  07/30/18  07:55    


 


MCV  94.5 fl (80-99)   07/30/18  07:55    


 


MCH  31.8 pg (27.0-31.0)  H  07/30/18  07:55    


 


MCHC Differential  33.6 pg (28.0-36.0)   07/30/18  07:55    


 


RDW  14.9 % (11.5-20.0)   07/30/18  07:55    


 


Plt Count  303 Th/cmm (150-400)   07/30/18  07:55    


 


MPV  7.1 fl  07/30/18  07:55    


 


Neutrophils %  55.0 % (40.0-80.0)   07/30/18  07:55    


 


Lymphocytes %  31.6 % (20.0-50.0)   07/30/18  07:55    


 


Monocytes %  10.9 % (2.0-10.0)  H  07/30/18  07:55    


 


Eosinophils %  1.7 % (0.0-5.0)   07/30/18  07:55    


 


Basophils %  0.8 % (0.0-2.0)   07/30/18  07:55    


 


Sodium  138 mEq/L (136-145)   07/30/18  07:55    


 


Potassium  4.2 mEq/L (3.5-5.1)   07/30/18  07:55    


 


Chloride  105 mEq/L ()   07/30/18  07:55    


 


Carbon Dioxide  26.4 mEq/L (21.0-31.0)   07/30/18  07:55    


 


Anion Gap  10.8  (7.0-16.0)   07/30/18  07:55    


 


BUN  14 mg/dL (7-25)   07/30/18  07:55    


 


Creatinine  0.8 mg/dL (0.7-1.3)   07/30/18  07:55    


 


Est GFR ( Amer)  > 60.0 ml/min (>90)   07/30/18  07:55    


 


Est GFR (Non-Af Amer)  > 60.0 ml/min  07/30/18  07:55    


 


BUN/Creatinine Ratio  17.5   07/30/18  07:55    


 


Glucose  87 mg/dL ()   07/30/18  07:55    


 


Calcium  9.4 mg/dL (8.6-10.3)   07/30/18  07:55    














- Physical Exam


Vitals and I&O: 


 Vital Signs











Temp  97.4 F   07/30/18 16:21


 


Pulse  72   07/30/18 19:30


 


Resp  16   07/30/18 19:30


 


BP  105/66   07/30/18 16:21


 


Pulse Ox  96   07/30/18 19:30








 Intake & Output











 07/30/18 07/30/18 07/31/18





 06:59 18:59 06:59


 


Intake Total 120 1200 


 


Balance 120 1200 


 


Intake:   


 


  Oral 120 1200 


 


Other:   


 


  # Voids 3 4 


 


  # Bowel Movements 0 2 











Active Medications: 


Current Medications





Acetaminophen (Tylenol)  650 mg PO Q4HR PRN


   PRN Reason: Mild Pain / Temp above 100


   Stop: 09/27/18 01:46


Al Hydrox/Mg Hydrox/Simethicone (Maalox)  30 ml PO Q4HR PRN


   PRN Reason: GI DISTRESS


   Stop: 09/27/18 01:46


Albuterol/Ipratropium (Duoneb Neb)  3 ml HHN Q2HRT PRN


   PRN Reason: Wheezing


   Stop: 09/27/18 08:00


Amiodarone HCl (Cordarone)  200 mg PO DAILY [facility]


   Stop: 09/27/18 08:59


   Last Admin: 07/30/18 08:26 Dose:  200 mg


Amoxicillin/Clavulanate Potassium (Augmentin 875-125mg)  1 tab PO BIDBRS [facility]


   Stop: 09/27/18 17:59


   Last Admin: 07/30/18 17:26 Dose:  1 tab


Cyanocobalamin (Vitamin B12)  1,000 mcg PO DAILY [facility]


   Stop: 09/27/18 08:59


   Last Admin: 07/30/18 08:25 Dose:  1,000 mcg


Diltiazem HCl (Cardizem)  60 mg PO DAILY [facility]


   Stop: 09/27/18 08:59


   Last Admin: 07/30/18 08:25 Dose:  60 mg


Divalproex Sodium (Depakote Dr)  500 mg PO BID [facility]; Protocol


   Stop: 09/27/18 08:59


   Last Admin: 07/30/18 17:26 Dose:  500 mg


Docusate Sodium (Colace)  100 mg PO BID PRN


   PRN Reason: CONSTIPATION


   Stop: 09/27/18 08:00


Ferrous Sulfate (Iron)  325 mg PO DAILY [facility]


   Stop: 09/27/18 08:59


   Last Admin: 07/30/18 08:23 Dose:  325 mg


Gabapentin (Neurontin)  300 mg PO TID [facility]


   Stop: 09/27/18 08:59


   Last Admin: 07/30/18 14:14 Dose:  300 mg


Lorazepam (Ativan)  0.5 mg PO Q4HR PRN; Protocol


   PRN Reason: Agitation


   Stop: 08/28/18 01:59


   Last Admin: 07/30/18 14:14 Dose:  0.5 mg


Magnesium Hydroxide (Milk Of Magnesia)  30 ml PO HS PRN


   PRN Reason: Constipation


Mirtazapine (Remeron)  15 mg PO HS [facility]; Protocol


   Stop: 09/27/18 20:59


   Last Admin: 07/29/18 20:40 Dose:  15 mg


Multivitamins/Vitamin C (Theragran)  1 tab PO DAILY [facility]


   Stop: 09/27/18 08:59


   Last Admin: 07/30/18 08:23 Dose:  1 tab


Olanzapine (Zyprexa)  5 mg PO HS [facility]; Protocol


   Stop: 09/28/18 20:59


Oxybutynin Chloride (Ditropan)  5 mg PO HS [facility]


   Stop: 09/27/18 20:59


   Last Admin: 07/29/18 20:40 Dose:  5 mg


Pantoprazole Sodium (Protonix)  40 mg PO BID [facility]


   Stop: 09/27/18 08:59


   Last Admin: 07/30/18 17:26 Dose:  40 mg


Quetiapine Fumarate (Seroquel)  100 mg PO BID [facility]; Protocol


   Stop: 09/28/18 08:59


   Last Admin: 07/30/18 17:26 Dose:  100 mg


Sodium Chloride (Nacl Tab)  1 gm PO BID [facility]


   Stop: 09/27/18 08:59


   Last Admin: 07/30/18 17:27 Dose:  1 gm


Tamsulosin HCl (Flomax)  0.4 mg PO DAILY [facility]


   Stop: 09/27/18 08:59


   Last Admin: 07/30/18 08:23 Dose:  0.4 mg


Zolpidem Tartrate (Ambien)  5 mg PO HS PRN


   PRN Reason: Insomnia


   Stop: 09/27/18 01:46


   Last Admin: 07/29/18 20:40 Dose:  5 mg








General: weak, lethargic, congested, demented


HEENT: NC/AT, PERRLA, EOMI, anicteric sclerae, throat clear, thinning hair


Neck: Supple, No JVD, No LAD


Lungs: wheezing, ronchi


Cardiovascular: RRR, Normal S1


Abdomen: soft, non-tender, non-distended


Extremities: clear


Neurological: no change





Internal Medicine Assmt/Plan





- Assessment


Assessment: 





ALOC: on and off; observe closely.





PNA: improving?





Acute Psychosis: follow recommendation by Psychiatrist.





Insomnia: on and off.





Agitation: sitter PRN





H/O A. Fib: rate controlled.





Leukocytosis: resolving?





s/p recent UGIB.

## 2018-07-31 RX ADMIN — PANTOPRAZOLE SODIUM SCH MG: 40 TABLET, DELAYED RELEASE ORAL at 16:37

## 2018-07-31 RX ADMIN — AMOXICILLIN AND CLAVULANATE POTASSIUM SCH TAB: 875; 125 TABLET, FILM COATED ORAL at 08:34

## 2018-07-31 RX ADMIN — AMOXICILLIN AND CLAVULANATE POTASSIUM SCH TAB: 875; 125 TABLET, FILM COATED ORAL at 17:31

## 2018-07-31 RX ADMIN — PANTOPRAZOLE SODIUM SCH MG: 40 TABLET, DELAYED RELEASE ORAL at 08:34

## 2018-07-31 RX ADMIN — DILTIAZEM HYDROCHLORIDE SCH MG: 30 TABLET, FILM COATED ORAL at 08:35

## 2018-07-31 NOTE — INTERNAL MEDICINE PROG NOTE
Internal Medicine Subjective





- Subjective


Service Date: 07/31/18


Patient seen and examined:: without staff


Patient is:: awake, non-interactive, in bed, agitated


Patient Complaints of:: unable to sleep


Per staff patient has:: no adverse event





Internal Medicine Objective





- Results


Result Diagrams: 


 07/30/18 07:55





 07/30/18 07:55


Recent Labs: 


 Laboratory Last Values











WBC  8.4 Th/cmm (4.8-10.8)   07/30/18  07:55    


 


RBC  4.08 Mil/cmm (3.80-5.80)   07/30/18  07:55    


 


Hgb  13.0 gm/dL (12-16)   07/30/18  07:55    


 


Hct  38.5 % (41.0-60)  L  07/30/18  07:55    


 


MCV  94.5 fl (80-99)   07/30/18  07:55    


 


MCH  31.8 pg (27.0-31.0)  H  07/30/18  07:55    


 


MCHC Differential  33.6 pg (28.0-36.0)   07/30/18  07:55    


 


RDW  14.9 % (11.5-20.0)   07/30/18  07:55    


 


Plt Count  303 Th/cmm (150-400)   07/30/18  07:55    


 


MPV  7.1 fl  07/30/18  07:55    


 


Neutrophils %  55.0 % (40.0-80.0)   07/30/18  07:55    


 


Lymphocytes %  31.6 % (20.0-50.0)   07/30/18  07:55    


 


Monocytes %  10.9 % (2.0-10.0)  H  07/30/18  07:55    


 


Eosinophils %  1.7 % (0.0-5.0)   07/30/18  07:55    


 


Basophils %  0.8 % (0.0-2.0)   07/30/18  07:55    


 


Sodium  138 mEq/L (136-145)   07/30/18  07:55    


 


Potassium  4.2 mEq/L (3.5-5.1)   07/30/18  07:55    


 


Chloride  105 mEq/L ()   07/30/18  07:55    


 


Carbon Dioxide  26.4 mEq/L (21.0-31.0)   07/30/18  07:55    


 


Anion Gap  10.8  (7.0-16.0)   07/30/18  07:55    


 


BUN  14 mg/dL (7-25)   07/30/18  07:55    


 


Creatinine  0.8 mg/dL (0.7-1.3)   07/30/18  07:55    


 


Est GFR ( Amer)  > 60.0 ml/min (>90)   07/30/18  07:55    


 


Est GFR (Non-Af Amer)  > 60.0 ml/min  07/30/18  07:55    


 


BUN/Creatinine Ratio  17.5   07/30/18  07:55    


 


Glucose  87 mg/dL ()   07/30/18  07:55    


 


Calcium  9.4 mg/dL (8.6-10.3)   07/30/18  07:55    














- Physical Exam


Vitals and I&O: 


 Vital Signs











Temp  98.2 F   07/31/18 06:38


 


Pulse  74   07/31/18 08:35


 


Resp  19   07/31/18 11:30


 


BP  138/71   07/31/18 06:38


 


Pulse Ox  96   07/31/18 07:37








 Intake & Output











 07/30/18 07/31/18 07/31/18





 18:59 06:59 18:59


 


Intake Total 1200 360 


 


Balance 1200 360 


 


Intake:   


 


  Oral 1200 360 


 


Other:   


 


  # Voids 4 2 


 


  # Bowel Movements 2 0 











Active Medications: 


Current Medications





Acetaminophen (Tylenol)  650 mg PO Q4HR PRN


   PRN Reason: Mild Pain / Temp above 100


   Stop: 09/27/18 01:46


Al Hydrox/Mg Hydrox/Simethicone (Maalox)  30 ml PO Q4HR PRN


   PRN Reason: GI DISTRESS


   Stop: 09/27/18 01:46


Albuterol/Ipratropium (Duoneb Neb)  3 ml HHN Q2HRT PRN


   PRN Reason: Wheezing


   Stop: 09/27/18 08:00


Amiodarone HCl (Cordarone)  200 mg PO DAILY [facility]


   Stop: 09/27/18 08:59


   Last Admin: 07/31/18 08:35 Dose:  200 mg


Amoxicillin/Clavulanate Potassium (Augmentin 875-125mg)  1 tab PO BIDBRS [facility]


   Stop: 09/27/18 17:59


   Last Admin: 07/31/18 08:34 Dose:  1 tab


Cyanocobalamin (Vitamin B12)  1,000 mcg PO DAILY [facility]


   Stop: 09/27/18 08:59


   Last Admin: 07/31/18 08:35 Dose:  1,000 mcg


Diltiazem HCl (Cardizem)  60 mg PO DAILY [facility]


   Stop: 09/27/18 08:59


   Last Admin: 07/31/18 08:35 Dose:  60 mg


Divalproex Sodium (Depakote Dr)  500 mg PO BID [facility]; Protocol


   Stop: 09/27/18 08:59


   Last Admin: 07/31/18 08:35 Dose:  500 mg


Docusate Sodium (Colace)  100 mg PO BID PRN


   PRN Reason: CONSTIPATION


   Stop: 09/27/18 08:00


Ferrous Sulfate (Iron)  325 mg PO DAILY [facility]


   Stop: 09/27/18 08:59


   Last Admin: 07/31/18 08:34 Dose:  325 mg


Gabapentin (Neurontin)  300 mg PO TID [facility]


   Stop: 09/27/18 08:59


   Last Admin: 07/31/18 08:34 Dose:  300 mg


Lorazepam (Ativan)  0.5 mg PO Q4HR PRN; Protocol


   PRN Reason: Agitation


   Stop: 08/28/18 01:59


   Last Admin: 07/31/18 08:35 Dose:  0.5 mg


Magnesium Hydroxide (Milk Of Magnesia)  30 ml PO HS PRN


   PRN Reason: Constipation


Mirtazapine (Remeron)  15 mg PO HS [facility]; Protocol


   Stop: 09/27/18 20:59


   Last Admin: 07/30/18 21:25 Dose:  15 mg


Multivitamins/Vitamin C (Theragran)  1 tab PO DAILY [facility]


   Stop: 09/27/18 08:59


   Last Admin: 07/31/18 08:35 Dose:  1 tab


Olanzapine (Zyprexa)  5 mg PO HS [facility]; Protocol


   Stop: 09/28/18 20:59


   Last Admin: 07/30/18 21:25 Dose:  5 mg


Oxybutynin Chloride (Ditropan)  5 mg PO HS [facility]


   Stop: 09/27/18 20:59


   Last Admin: 07/30/18 21:25 Dose:  5 mg


Pantoprazole Sodium (Protonix)  40 mg PO BID [facility]


   Stop: 09/27/18 08:59


   Last Admin: 07/31/18 08:34 Dose:  40 mg


Quetiapine Fumarate (Seroquel)  100 mg PO BID [facility]; Protocol


   Stop: 09/28/18 08:59


   Last Admin: 07/31/18 08:34 Dose:  100 mg


Sodium Chloride (Nacl Tab)  1 gm PO BID [facility]


   Stop: 09/27/18 08:59


   Last Admin: 07/31/18 08:36 Dose:  1 gm


Tamsulosin HCl (Flomax)  0.4 mg PO DAILY [facility]


   Stop: 09/27/18 08:59


   Last Admin: 07/31/18 08:36 Dose:  0.4 mg


Zolpidem Tartrate (Ambien)  5 mg PO HS PRN


   PRN Reason: Insomnia


   Stop: 09/27/18 01:46


   Last Admin: 07/30/18 21:25 Dose:  5 mg








General: weak, lethargic, congested, demented


HEENT: NC/AT, PERRLA, EOMI, anicteric sclerae, throat clear, thinning hair


Neck: Supple, No JVD, No LAD


Lungs: wheezing, ronchi


Cardiovascular: RRR, Normal S1


Abdomen: soft, non-tender, non-distended


Extremities: clear


Neurological: no change





Internal Medicine Assmt/Plan





- Assessment


Assessment: 


H/O A. Fib: rate controlled.





ALOC: on and off; observe closely.





PNA: improving?





Acute Psychosis: follow recommendation by Psychiatrist.





Insomnia: on and off.





Agitation: sitter PRN





Leukocytosis: resolving?





s/p recent UGIB.

## 2018-07-31 NOTE — PROGRESS NOTES
DATE:  07/31/2018



SUBJECTIVE:  Chart reviewed and the patient interviewed.  Also, discussed the

patient's condition with the staff and reviewed records and labs.  The patient

is confused and he is still forgetful.  The patient also is still in a depressed

mood.  The patient also is ____ and he is impulsive.  Also, is still preoccupied

with "I want to smoke."  Otherwise, the patient is compliant with taking his

medications with no side effects of medications.



ASSESSMENT:  The patient is still anxious and in irritable mood and needs close

monitoring.



TREATMENT PLAN:  We will continue Depakote, Seroquel, Remeron and Zyprexa. 

Also, we will monitor Depakote blood level and we will continue to follow up

closely.





DD: 07/31/2018 17:52

DT: 07/31/2018 23:41

JOB# [id number]  [id number]

## 2018-08-01 RX ADMIN — AMOXICILLIN AND CLAVULANATE POTASSIUM SCH TAB: 875; 125 TABLET, FILM COATED ORAL at 17:00

## 2018-08-01 RX ADMIN — PANTOPRAZOLE SODIUM SCH MG: 40 TABLET, DELAYED RELEASE ORAL at 16:59

## 2018-08-01 RX ADMIN — PANTOPRAZOLE SODIUM SCH MG: 40 TABLET, DELAYED RELEASE ORAL at 06:36

## 2018-08-01 RX ADMIN — AMOXICILLIN AND CLAVULANATE POTASSIUM SCH TAB: 875; 125 TABLET, FILM COATED ORAL at 09:00

## 2018-08-01 RX ADMIN — DILTIAZEM HYDROCHLORIDE SCH MG: 30 TABLET, FILM COATED ORAL at 09:26

## 2018-08-01 NOTE — INTERNAL MEDICINE PROG NOTE
Internal Medicine Subjective





- Subjective


Service Date: 18


Patient seen and examined:: without staff


Patient is:: awake, non-interactive, in bed, agitated


Patient Complaints of:: unable to sleep


Per staff patient has:: no adverse event





Internal Medicine Objective





- Results


Result Diagrams: 


 18 07:55





 18 07:55


Recent Labs: 


 Laboratory Last Values











WBC  8.4 Th/cmm (4.8-10.8)   18  07:55    


 


RBC  4.08 Mil/cmm (3.80-5.80)   18  07:55    


 


Hgb  13.0 gm/dL (12-16)   18  07:55    


 


Hct  38.5 % (41.0-60)  L  18  07:55    


 


MCV  94.5 fl (80-99)   18  07:55    


 


MCH  31.8 pg (27.0-31.0)  H  18  07:55    


 


MCHC Differential  33.6 pg (28.0-36.0)   18  07:55    


 


RDW  14.9 % (11.5-20.0)   18  07:55    


 


Plt Count  303 Th/cmm (150-400)   18  07:55    


 


MPV  7.1 fl  18  07:55    


 


Neutrophils %  55.0 % (40.0-80.0)   18  07:55    


 


Lymphocytes %  31.6 % (20.0-50.0)   18  07:55    


 


Monocytes %  10.9 % (2.0-10.0)  H  18  07:55    


 


Eosinophils %  1.7 % (0.0-5.0)   18  07:55    


 


Basophils %  0.8 % (0.0-2.0)   18  07:55    


 


Sodium  138 mEq/L (136-145)   18  07:55    


 


Potassium  4.2 mEq/L (3.5-5.1)   18  07:55    


 


Chloride  105 mEq/L ()   18  07:55    


 


Carbon Dioxide  26.4 mEq/L (21.0-31.0)   18  07:55    


 


Anion Gap  10.8  (7.0-16.0)   18  07:55    


 


BUN  14 mg/dL (7-25)   18  07:55    


 


Creatinine  0.8 mg/dL (0.7-1.3)   18  07:55    


 


Est GFR ( Amer)  > 60.0 ml/min (>90)   18  07:55    


 


Est GFR (Non-Af Amer)  > 60.0 ml/min  18  07:55    


 


BUN/Creatinine Ratio  17.5   18  07:55    


 


Glucose  87 mg/dL ()   18  07:55    


 


Calcium  9.4 mg/dL (8.6-10.3)   18  07:55    














- Physical Exam


Vitals and I&O: 


 Vital Signs











Temp  99.1 F   18 20:00


 


Pulse  101   18 20:00


 


Resp  18   18 20:00


 


BP  119/77   18 20:00


 


Pulse Ox  96   18 20:00








 Intake & Output











 18





 06:59 18:59 06:59


 


Intake Total 120 1200 


 


Balance 120 1200 


 


Intake:   


 


  Oral 120 1200 


 


Other:   


 


  # Voids 3  


 


  # Bowel Movements 1 1 











Active Medications: 


Current Medications





Acetaminophen (Tylenol)  650 mg PO Q4HR PRN


   PRN Reason: Mild Pain / Temp above 100


   Stop: 18 01:46


Al Hydrox/Mg Hydrox/Simethicone (Maalox)  30 ml PO Q4HR PRN


   PRN Reason: GI DISTRESS


   Stop: 18 01:46


Albuterol/Ipratropium (Duoneb Neb)  3 ml HHN Q2HRT PRN


   PRN Reason: Wheezing


   Stop: 18 08:00


Amiodarone HCl (Cordarone)  200 mg PO DAILY [facility]


   Stop: 18 08:59


   Last Admin: 18 09:24 Dose:  200 mg


Amoxicillin/Clavulanate Potassium (Augmentin 875-125mg)  1 tab PO BIDBRS [facility]


   Stop: 18 17:59


   Last Admin: 18 17:00 Dose:  1 tab


Cyanocobalamin (Vitamin B12)  1,000 mcg PO DAILY [facility]


   Stop: 18 08:59


   Last Admin: 18 09:24 Dose:  1,000 mcg


Diltiazem HCl (Cardizem)  60 mg PO DAILY [facility]


   Stop: 18 08:59


   Last Admin: 18 09:26 Dose:  60 mg


Divalproex Sodium (Depakote Dr)  500 mg PO BID [facility]; Protocol


   Stop: 18 08:59


   Last Admin: 18 16:59 Dose:  500 mg


Docusate Sodium (Colace)  100 mg PO BID PRN


   PRN Reason: CONSTIPATION


   Stop: 18 08:00


Ferrous Sulfate (Iron)  325 mg PO DAILY [facility]


   Stop: 18 08:59


   Last Admin: 18 09:25 Dose:  325 mg


Gabapentin (Neurontin)  300 mg PO TID [facility]


   Stop: 18 08:59


   Last Admin: 18 20:15 Dose:  Not Given


Lorazepam (Ativan)  0.5 mg PO Q4HR PRN; Protocol


   PRN Reason: Agitation


   Stop: 18 01:59


   Last Admin: 18 20:11 Dose:  0.5 mg


Magnesium Hydroxide (Milk Of Magnesia)  30 ml PO HS PRN


   PRN Reason: Constipation


Mirtazapine (Remeron)  15 mg PO HS [facility]; Protocol


   Stop: 18 20:59


   Last Admin: 18 20:12 Dose:  15 mg


Multivitamins/Vitamin C (Theragran)  1 tab PO DAILY [facility]


   Stop: 18 08:59


   Last Admin: 18 09:25 Dose:  1 tab


Olanzapine (Zyprexa)  5 mg PO HS [facility]; Protocol


   Stop: 18 20:59


   Last Admin: 18 20:11 Dose:  5 mg


Ondansetron HCl (Zofran Odt)  4 mg PO Q6H PRN


   PRN Reason: Nausea / Vomiting


   Stop: 18 05:59


   Last Admin: 18 06:36 Dose:  4 mg


Oxybutynin Chloride (Ditropan)  5 mg PO HS [facility]


   Stop: 18 20:59


   Last Admin: 18 20:11 Dose:  5 mg


Pantoprazole Sodium (Protonix)  40 mg PO BID [facility]


   Stop: 18 06:59


   Last Admin: 18 16:59 Dose:  40 mg


Quetiapine Fumarate (Seroquel)  100 mg PO BID [facility]; Protocol


   Stop: 18 08:59


   Last Admin: 18 16:59 Dose:  100 mg


Sodium Chloride (Nacl Tab)  1 gm PO BID [facility]


   Stop: 18 08:59


   Last Admin: 18 17:00 Dose:  1 gm


Tamsulosin HCl (Flomax)  0.4 mg PO DAILY [facility]


   Stop: 18 08:59


   Last Admin: 18 09:24 Dose:  0.4 mg


Zolpidem Tartrate (Ambien)  5 mg PO HS PRN


   PRN Reason: Insomnia


   Stop: 18 01:46


   Last Admin: 18 20:11 Dose:  5 mg








General: weak, lethargic, congested, demented


HEENT: NC/AT, PERRLA, EOMI, anicteric sclerae, throat clear, thinning hair


Neck: Supple, No JVD, No LAD


Lungs: wheezing, ronchi


Cardiovascular: RRR, Normal S1


Abdomen: soft, non-tender, non-distended


Extremities: clear


Neurological: no change





Internal Medicine Assmt/Plan





- Assessment


Assessment: 


Noncompliance: education.





PNA: improving? pt refuses IVPB ABX.





H/O A. Fib: rate controlled.





ALOC: on and off; observe closely.





Acute Psychosis: follow recommendation by Psychiatrist.





Insomnia: on and off.





Agitation: sitter PRN





Leukocytosis: resolving?





s/p recent UGIB.





Nutritional Asmnt/Malnutr-PDOC





- Dietary Evaluation


Malnutrition Findings (Please click <Entered> for more info): 








Nutritional Asmnt/Malnutrition                             Start:  18 13:

17


Text:                                                      Status: Complete    

  


Freq:                                                                          

  


Protocol:                                                                      

  


 Document     18 13:17  LCLINDAG  (Rec: 18 13:22  LCLINDAG  [email or web address])


 Nutritional Asmnt/Malnutrition


     Patient General Information


      Nutritional Screening                      Moderate Risk


      Diagnosis                                  psychosis


      Pertinent Medical Hx/Surgical Hx           CAD, s/p MI, afib, anemia, BPH


                                                 , COPD, PNA, psychosis,


                                                 anxiety, depression


      Subjective Information                     Pt seen sleeping in bed at


                                                 time of visit. Per EMR, PO


                                                 intake 100% of meals. Per


                                                 nurse note, pt had episode of


                                                 vomiting x 1 last night.


      Current Diet Order/ Nutrition Support      [facility] soft chopped bland


      Pertinent Medications                      vit B12, colace, iron, remeron


                                                 , theragran, protonix, nacl


                                                 tab, seroquel


      Pertinent Labs                              nutritin labs WNL


     Nutritional Hx/Data


      Height                                     1.75 m


      Height (Calculated Centimeters)            175.3


      Current Weight (lbs)                       61.235 kg


      Weight (Calculated Kilograms)              61.2


      Weight (Calculated Grams)                  20497.0


      Ideal Body Weight                          160


      Body Mass Index (BMI)                      19.9


      Weight Status                              Approriate


     GI Symptoms


      GI Symptoms                                None


      Last BM                                    


      Difficult in:                              None


      Skin Integrity/Comment:                    intact


      Current %PO                                Good (%)


     Estimated Nutritional Goals


      BEE in Kcals:                              Using Current wt


      Calories/Kcals/Kg                          25-30


      Kcals Calculated                           4279-2747


      Protein:                                   Using Current wt


      Protein g/k


      Protein Calculated                         61


      Fluid: ml                                  1525-1830ml (1ml/kcal)


     Nutritional Problem


      No current Nutrition Prob


       Problem                                   N/A


     Malnutrition Alert


      Is there a minimum of two criteria         No


       selected?                                 


       Query Text:Check all the applicable       


       criteria. A minimum of two criteria are   


       recommended for diagnosis of either       


       severe or non-severe malnutrition.        


     Malnutrition Related to Morbid Obesity


      Malnutrition related to morbid obesity     No


     Intervention/Recommendation


      Comments                                   1. Continue with [facility] soft


                                                 chopped bland diet as ordered.


                                                 2. Monitor PO intake, wt, labs


                                                 and skin integrity


                                                 3. F/U as low risk in 7 days,


                                                 


     Expected Outcomes/Goals


      Expected Outcomes/Goals                    1. PO intake to meet at least


                                                 75% of nutritional needs.


                                                 2. Wt stability, skin to


                                                 remain intact, labs WNL.

## 2018-08-02 RX ADMIN — AMOXICILLIN AND CLAVULANATE POTASSIUM SCH TAB: 875; 125 TABLET, FILM COATED ORAL at 17:13

## 2018-08-02 RX ADMIN — PANTOPRAZOLE SODIUM SCH MG: 40 TABLET, DELAYED RELEASE ORAL at 08:41

## 2018-08-02 RX ADMIN — AMOXICILLIN AND CLAVULANATE POTASSIUM SCH TAB: 875; 125 TABLET, FILM COATED ORAL at 08:41

## 2018-08-02 RX ADMIN — DILTIAZEM HYDROCHLORIDE SCH MG: 30 TABLET, FILM COATED ORAL at 08:39

## 2018-08-02 RX ADMIN — PANTOPRAZOLE SODIUM SCH MG: 40 TABLET, DELAYED RELEASE ORAL at 17:13

## 2018-08-02 NOTE — INTERNAL MEDICINE PROG NOTE
Internal Medicine Subjective





- Subjective


Service Date: 18


Patient seen and examined:: without staff


Patient is:: awake, non-interactive, in bed, agitated


Patient Complaints of:: unable to sleep


Per staff patient has:: no adverse event





Internal Medicine Objective





- Results


Result Diagrams: 


 18 07:55





 18 07:55


Recent Labs: 


 Laboratory Last Values











WBC  8.4 Th/cmm (4.8-10.8)   18  07:55    


 


RBC  4.08 Mil/cmm (3.80-5.80)   18  07:55    


 


Hgb  13.0 gm/dL (12-16)   18  07:55    


 


Hct  38.5 % (41.0-60)  L  18  07:55    


 


MCV  94.5 fl (80-99)   18  07:55    


 


MCH  31.8 pg (27.0-31.0)  H  18  07:55    


 


MCHC Differential  33.6 pg (28.0-36.0)   18  07:55    


 


RDW  14.9 % (11.5-20.0)   18  07:55    


 


Plt Count  303 Th/cmm (150-400)   18  07:55    


 


MPV  7.1 fl  18  07:55    


 


Neutrophils %  55.0 % (40.0-80.0)   18  07:55    


 


Lymphocytes %  31.6 % (20.0-50.0)   18  07:55    


 


Monocytes %  10.9 % (2.0-10.0)  H  18  07:55    


 


Eosinophils %  1.7 % (0.0-5.0)   18  07:55    


 


Basophils %  0.8 % (0.0-2.0)   18  07:55    


 


Sodium  138 mEq/L (136-145)   18  07:55    


 


Potassium  4.2 mEq/L (3.5-5.1)   18  07:55    


 


Chloride  105 mEq/L ()   18  07:55    


 


Carbon Dioxide  26.4 mEq/L (21.0-31.0)   18  07:55    


 


Anion Gap  10.8  (7.0-16.0)   18  07:55    


 


BUN  14 mg/dL (7-25)   18  07:55    


 


Creatinine  0.8 mg/dL (0.7-1.3)   18  07:55    


 


Est GFR ( Amer)  > 60.0 ml/min (>90)   18  07:55    


 


Est GFR (Non-Af Amer)  > 60.0 ml/min  18  07:55    


 


BUN/Creatinine Ratio  17.5   18  07:55    


 


Glucose  87 mg/dL ()   18  07:55    


 


Calcium  9.4 mg/dL (8.6-10.3)   18  07:55    














- Physical Exam


Vitals and I&O: 


 Vital Signs











Temp  98.2 F   18 20:00


 


Pulse  78   18 20:32


 


Resp  20   18 20:32


 


BP  127/70   18 20:00


 


Pulse Ox  94   18 20:32








 Intake & Output











 18





 06:59 18:59 06:59


 


Intake Total 120  


 


Balance 120  


 


Intake:   


 


  Oral 120  


 


Other:   


 


  # Voids 3  











Active Medications: 


Current Medications





Acetaminophen (Tylenol)  650 mg PO Q4HR PRN


   PRN Reason: Mild Pain / Temp above 100


   Stop: 18 01:46


Al Hydrox/Mg Hydrox/Simethicone (Maalox)  30 ml PO Q4HR PRN


   PRN Reason: GI DISTRESS


   Stop: 18 01:46


Albuterol/Ipratropium (Duoneb Neb)  3 ml HHN Q2HRT PRN


   PRN Reason: Wheezing


   Stop: 18 08:00


Amiodarone HCl (Cordarone)  200 mg PO DAILY [facility]


   Stop: 18 08:59


   Last Admin: 18 08:42 Dose:  200 mg


Amoxicillin/Clavulanate Potassium (Augmentin 875-125mg)  1 tab PO BIDBRS [facility]


   Stop: 18 17:59


   Last Admin: 18 17:13 Dose:  1 tab


Cyanocobalamin (Vitamin B12)  1,000 mcg PO DAILY [facility]


   Stop: 18 08:59


   Last Admin: 18 08:41 Dose:  1,000 mcg


Diltiazem HCl (Cardizem)  60 mg PO DAILY [facility]


   Stop: 18 08:59


   Last Admin: 18 08:39 Dose:  60 mg


Divalproex Sodium (Depakote Dr)  500 mg PO BID [facility]; Protocol


   Stop: 18 08:59


   Last Admin: 18 17:12 Dose:  500 mg


Docusate Sodium (Colace)  100 mg PO BID PRN


   PRN Reason: CONSTIPATION


   Stop: 18 08:00


Ferrous Sulfate (Iron)  325 mg PO DAILY [facility]


   Stop: 18 08:59


   Last Admin: 18 08:41 Dose:  325 mg


Gabapentin (Neurontin)  300 mg PO TID [facility]


   Stop: 18 08:59


   Last Admin: 18 20:42 Dose:  300 mg


Lorazepam (Ativan)  0.5 mg PO Q4HR PRN; Protocol


   PRN Reason: Agitation


   Stop: 18 01:59


   Last Admin: 18 20:11 Dose:  0.5 mg


Magnesium Hydroxide (Milk Of Magnesia)  30 ml PO HS PRN


   PRN Reason: Constipation


Mirtazapine (Remeron)  15 mg PO HS [facility]; Protocol


   Stop: 18 20:59


   Last Admin: 18 20:42 Dose:  15 mg


Multivitamins/Vitamin C (Theragran)  1 tab PO DAILY [facility]


   Stop: 18 08:59


   Last Admin: 18 08:40 Dose:  1 tab


Ondansetron HCl (Zofran Odt)  4 mg PO Q6H PRN


   PRN Reason: Nausea / Vomiting


   Stop: 18 05:59


   Last Admin: 18 06:36 Dose:  4 mg


Oxybutynin Chloride (Ditropan)  5 mg PO HS [facility]


   Stop: 18 20:59


   Last Admin: 18 20:42 Dose:  5 mg


Pantoprazole Sodium (Protonix)  40 mg PO BID [facility]


   Stop: 18 06:59


   Last Admin: 18 17:13 Dose:  40 mg


Quetiapine Fumarate (Seroquel)  150 mg PO BID [facility]; Protocol


   Stop: 10/01/18 08:59


   Last Admin: 18 17:13 Dose:  150 mg


Quetiapine Fumarate (Seroquel)  150 mg PO HS [facility]; Protocol


   Stop: 10/01/18 20:59


   Last Admin: 18 20:42 Dose:  150 mg


Sodium Chloride (Nacl Tab)  1 gm PO BID [facility]


   Stop: 18 08:59


   Last Admin: 18 17:14 Dose:  1 gm


Tamsulosin HCl (Flomax)  0.4 mg PO DAILY [facility]


   Stop: 18 08:59


   Last Admin: 18 08:41 Dose:  0.4 mg


Zolpidem Tartrate (Ambien)  5 mg PO HS PRN


   PRN Reason: Insomnia


   Stop: 18 01:46


   Last Admin: 18 20:42 Dose:  5 mg








General: weak, lethargic, congested, demented


HEENT: NC/AT, PERRLA, EOMI, anicteric sclerae, throat clear, thinning hair


Neck: Supple, No JVD, No LAD


Lungs: wheezing, ronchi


Cardiovascular: RRR, Normal S1


Abdomen: soft, non-tender, non-distended


Extremities: clear


Neurological: no change





Internal Medicine Assmt/Plan





- Assessment


Assessment: 


ALOC: on and off; observe closely.





Noncompliance: education.





PNA: improving? pt refuses IVPB ABX.





H/O A. Fib: rate controlled.





Acute Psychosis: follow recommendation by Psychiatrist.





Insomnia: on and off.





Agitation: sitter PRN





Leukocytosis: resolving?





s/p recent UGIB.





Nutritional Asmnt/Malnutr-PDOC





- Dietary Evaluation


Malnutrition Findings (Please click <Entered> for more info): 








Nutritional Asmnt/Malnutrition                             Start:  18 13:

17


Text:                                                      Status: Complete    

  


Freq:                                                                          

  


Protocol:                                                                      

  


 Document     18 13:17  LCLINDAG  (Rec: 18 13:22  LCLINDAG  [email or web address])


 Nutritional Asmnt/Malnutrition


     Patient General Information


      Nutritional Screening                      Moderate Risk


      Diagnosis                                  psychosis


      Pertinent Medical Hx/Surgical Hx           CAD, s/p MI, afib, anemia, BPH


                                                 , COPD, PNA, psychosis,


                                                 anxiety, depression


      Subjective Information                     Pt seen sleeping in bed at


                                                 time of visit. Per EMR, PO


                                                 intake 100% of meals. Per


                                                 nurse note, pt had episode of


                                                 vomiting x 1 last night.


      Current Diet Order/ Nutrition Support      [facility] soft chopped bland


      Pertinent Medications                      vit B12, colace, iron, remeron


                                                 , theragran, protonix, nacl


                                                 tab, seroquel


      Pertinent Labs                              nutritin labs WNL


     Nutritional Hx/Data


      Height                                     1.75 m


      Height (Calculated Centimeters)            175.3


      Current Weight (lbs)                       61.235 kg


      Weight (Calculated Kilograms)              61.2


      Weight (Calculated Grams)                  41382.0


      Ideal Body Weight                          160


      Body Mass Index (BMI)                      19.9


      Weight Status                              Approriate


     GI Symptoms


      GI Symptoms                                None


      Last BM                                    


      Difficult in:                              None


      Skin Integrity/Comment:                    intact


      Current %PO                                Good (%)


     Estimated Nutritional Goals


      BEE in Kcals:                              Using Current wt


      Calories/Kcals/Kg                          25-30


      Kcals Calculated                           0813-5458


      Protein:                                   Using Current wt


      Protein g/k


      Protein Calculated                         61


      Fluid: ml                                  1525-1830ml (1ml/kcal)


     Nutritional Problem


      No current Nutrition Prob


       Problem                                   N/A


     Malnutrition Alert


      Is there a minimum of two criteria         No


       selected?                                 


       Query Text:Check all the applicable       


       criteria. A minimum of two criteria are   


       recommended for diagnosis of either       


       severe or non-severe malnutrition.        


     Malnutrition Related to Morbid Obesity


      Malnutrition related to morbid obesity     No


     Intervention/Recommendation


      Comments                                   1. Continue with [facility] soft


                                                 chopped bland diet as ordered.


                                                 2. Monitor PO intake, wt, labs


                                                 and skin integrity


                                                 3. F/U as low risk in 7 days,


                                                 


     Expected Outcomes/Goals


      Expected Outcomes/Goals                    1. PO intake to meet at least


                                                 75% of nutritional needs.


                                                 2. Wt stability, skin to


                                                 remain intact, labs WNL.

## 2018-08-03 RX ADMIN — AMOXICILLIN AND CLAVULANATE POTASSIUM SCH TAB: 875; 125 TABLET, FILM COATED ORAL at 08:13

## 2018-08-03 RX ADMIN — PANTOPRAZOLE SODIUM SCH MG: 40 TABLET, DELAYED RELEASE ORAL at 08:15

## 2018-08-03 RX ADMIN — PANTOPRAZOLE SODIUM SCH MG: 40 TABLET, DELAYED RELEASE ORAL at 17:06

## 2018-08-03 RX ADMIN — AMOXICILLIN AND CLAVULANATE POTASSIUM SCH TAB: 875; 125 TABLET, FILM COATED ORAL at 17:05

## 2018-08-03 RX ADMIN — DILTIAZEM HYDROCHLORIDE SCH MG: 30 TABLET, FILM COATED ORAL at 08:19

## 2018-08-03 NOTE — INTERNAL MEDICINE PROG NOTE
Internal Medicine Subjective





- Subjective


Service Date: 18


Patient seen and examined:: without staff


Patient is:: awake, non-interactive, in bed, agitated


Patient Complaints of:: unable to sleep


Per staff patient has:: no adverse event





Internal Medicine Objective





- Results


Result Diagrams: 


 18 07:55





 18 07:55


Recent Labs: 


 Laboratory Last Values











WBC  8.4 Th/cmm (4.8-10.8)   18  07:55    


 


RBC  4.08 Mil/cmm (3.80-5.80)   18  07:55    


 


Hgb  13.0 gm/dL (12-16)   18  07:55    


 


Hct  38.5 % (41.0-60)  L  18  07:55    


 


MCV  94.5 fl (80-99)   18  07:55    


 


MCH  31.8 pg (27.0-31.0)  H  18  07:55    


 


MCHC Differential  33.6 pg (28.0-36.0)   18  07:55    


 


RDW  14.9 % (11.5-20.0)   18  07:55    


 


Plt Count  303 Th/cmm (150-400)   18  07:55    


 


MPV  7.1 fl  18  07:55    


 


Neutrophils %  55.0 % (40.0-80.0)   18  07:55    


 


Lymphocytes %  31.6 % (20.0-50.0)   18  07:55    


 


Monocytes %  10.9 % (2.0-10.0)  H  18  07:55    


 


Eosinophils %  1.7 % (0.0-5.0)   18  07:55    


 


Basophils %  0.8 % (0.0-2.0)   18  07:55    


 


Sodium  138 mEq/L (136-145)   18  07:55    


 


Potassium  4.2 mEq/L (3.5-5.1)   18  07:55    


 


Chloride  105 mEq/L ()   18  07:55    


 


Carbon Dioxide  26.4 mEq/L (21.0-31.0)   18  07:55    


 


Anion Gap  10.8  (7.0-16.0)   18  07:55    


 


BUN  14 mg/dL (7-25)   18  07:55    


 


Creatinine  0.8 mg/dL (0.7-1.3)   18  07:55    


 


Est GFR ( Amer)  > 60.0 ml/min (>90)   18  07:55    


 


Est GFR (Non-Af Amer)  > 60.0 ml/min  18  07:55    


 


BUN/Creatinine Ratio  17.5   18  07:55    


 


Glucose  87 mg/dL ()   18  07:55    


 


Calcium  9.4 mg/dL (8.6-10.3)   18  07:55    


 


Valproic Acid  33.5 ug/mL (50.0-100.0)  L  18  10:05    














- Physical Exam


Vitals and I&O: 


 Vital Signs











Temp  98.2 F   18 22:23


 


Pulse  76   18 22:23


 


Resp  19   18 22:23


 


BP  130/76   18 22:23


 


Pulse Ox  97   18 22:23








 Intake & Output











 18





 06:59 18:59 06:59


 


Intake Total 120 1200 120


 


Balance 120 1200 120


 


Intake:   


 


  Oral 120 1200 120


 


Other:   


 


  # Voids 2  3


 


  # Bowel Movements  1 1











Active Medications: 


Current Medications





Acetaminophen (Tylenol)  650 mg PO Q4HR PRN


   PRN Reason: Mild Pain / Temp above 100


   Stop: 18 01:46


Al Hydrox/Mg Hydrox/Simethicone (Maalox)  30 ml PO Q4HR PRN


   PRN Reason: GI DISTRESS


   Stop: 18 01:46


Albuterol/Ipratropium (Duoneb Neb)  3 ml HHN Q2HRT PRN


   PRN Reason: Wheezing


   Stop: 18 08:00


Amiodarone HCl (Cordarone)  200 mg PO DAILY [facility]


   Stop: 18 08:59


   Last Admin: 18 08:18 Dose:  200 mg


Amoxicillin/Clavulanate Potassium (Augmentin 875-125mg)  1 tab PO BIDBRS [facility]


   Stop: 18 17:59


   Last Admin: 18 17:05 Dose:  1 tab


Cyanocobalamin (Vitamin B12)  1,000 mcg PO DAILY [facility]


   Stop: 18 08:59


   Last Admin: 18 08:15 Dose:  1,000 mcg


Diltiazem HCl (Cardizem)  60 mg PO DAILY [facility]


   Stop: 18 08:59


   Last Admin: 18 08:19 Dose:  60 mg


Divalproex Sodium (Depakote Dr)  500 mg PO BID [facility]; Protocol


   Stop: 18 08:59


   Last Admin: 18 17:08 Dose:  500 mg


Docusate Sodium (Colace)  100 mg PO BID PRN


   PRN Reason: CONSTIPATION


   Stop: 18 08:00


Ferrous Sulfate (Iron)  325 mg PO DAILY [facility]


   Stop: 18 08:59


   Last Admin: 18 08:16 Dose:  325 mg


Gabapentin (Neurontin)  300 mg PO TID [facility]


   Stop: 18 08:59


   Last Admin: 18 20:54 Dose:  300 mg


Lorazepam (Ativan)  0.5 mg PO Q4HR PRN; Protocol


   PRN Reason: Agitation


   Stop: 18 01:59


   Last Admin: 18 08:15 Dose:  0.5 mg


Magnesium Hydroxide (Milk Of Magnesia)  30 ml PO HS PRN


   PRN Reason: Constipation


Mirtazapine (Remeron)  15 mg PO HS [facility]; Protocol


   Stop: 18 20:59


   Last Admin: 18 20:54 Dose:  15 mg


Multivitamins/Vitamin C (Theragran)  1 tab PO DAILY [facility]ADDY


   Stop: 18 08:59


   Last Admin: 18 08:16 Dose:  1 tab


Ondansetron HCl (Zofran Odt)  4 mg PO Q6H PRN


   PRN Reason: Nausea / Vomiting


   Stop: 18 05:59


   Last Admin: 18 06:36 Dose:  4 mg


Oxybutynin Chloride (Ditropan)  5 mg PO HS [facility]


   Stop: 18 20:59


   Last Admin: 18 20:54 Dose:  5 mg


Pantoprazole Sodium (Protonix)  40 mg PO BID [facility]


   Stop: 18 06:59


   Last Admin: 18 17:06 Dose:  40 mg


Quetiapine Fumarate (Seroquel)  150 mg PO BID [facility]; Protocol


   Stop: 10/01/18 08:59


   Last Admin: 18 17:06 Dose:  150 mg


Quetiapine Fumarate (Seroquel)  150 mg PO HS [facility]; Protocol


   Stop: 10/01/18 20:59


   Last Admin: 18 20:54 Dose:  150 mg


Sodium Chloride (Nacl Tab)  1 gm PO BID [facility]


   Stop: 18 08:59


   Last Admin: 18 17:05 Dose:  1 gm


Tamsulosin HCl (Flomax)  0.4 mg PO DAILY [facility]


   Stop: 18 08:59


   Last Admin: 18 08:15 Dose:  0.4 mg


Zolpidem Tartrate (Ambien)  5 mg PO HS PRN


   PRN Reason: Insomnia


   Stop: 18 01:46


   Last Admin: 18 20:54 Dose:  5 mg








General: weak, lethargic, congested, demented


HEENT: NC/AT, PERRLA, EOMI, anicteric sclerae, throat clear, thinning hair


Neck: Supple, No JVD, No LAD


Lungs: wheezing, ronchi


Cardiovascular: RRR, Normal S1


Abdomen: soft, non-tender, non-distended


Extremities: clear


Neurological: no change





Internal Medicine Assmt/Plan





- Assessment


Assessment: 


PNA: improving? pt refuses IVPB ABX.





ALOC: on and off; observe closely.





Noncompliance: education.





H/O A. Fib: rate controlled.





Acute Psychosis: follow recommendation by Psychiatrist.





Insomnia: on and off.





Agitation: sitter PRN





Leukocytosis: resolving?





s/p recent UGIB.





Nutritional Asmnt/Malnutr-PDOC





- Dietary Evaluation


Malnutrition Findings (Please click <Entered> for more info): 








Nutritional Asmnt/Malnutrition                             Start:  18 13:

17


Text:                                                      Status: Complete    

  


Freq:                                                                          

  


Protocol:                                                                      

  


 Document     18 13:17  DONNELLG  (Rec: 18 13:22  LINDA  [email or web address])


 Nutritional Asmnt/Malnutrition


     Patient General Information


      Nutritional Screening                      Moderate Risk


      Diagnosis                                  psychosis


      Pertinent Medical Hx/Surgical Hx           CAD, s/p MI, afib, anemia, BPH


                                                 , COPD, PNA, psychosis,


                                                 anxiety, depression


      Subjective Information                     Pt seen sleeping in bed at


                                                 time of visit. Per EMR, PO


                                                 intake 100% of meals. Per


                                                 nurse note, pt had episode of


                                                 vomiting x 1 last night.


      Current Diet Order/ Nutrition Support      [facility] soft chopped bland


      Pertinent Medications                      vit B12, colace, iron, remeron


                                                 , theragran, protonix, nacl


                                                 tab, seroquel


      Pertinent Labs                              nutritin labs WNL


     Nutritional Hx/Data


      Height                                     1.75 m


      Height (Calculated Centimeters)            175.3


      Current Weight (lbs)                       61.235 kg


      Weight (Calculated Kilograms)              61.2


      Weight (Calculated Grams)                  73990.0


      Ideal Body Weight                          160


      Body Mass Index (BMI)                      19.9


      Weight Status                              Approriate


     GI Symptoms


      GI Symptoms                                None


      Last BM                                    


      Difficult in:                              None


      Skin Integrity/Comment:                    intact


      Current %PO                                Good (%)


     Estimated Nutritional Goals


      BEE in Kcals:                              Using Current wt


      Calories/Kcals/Kg                          25-30


      Kcals Calculated                           6738-8240


      Protein:                                   Using Current wt


      Protein g/k


      Protein Calculated                         61


      Fluid: ml                                  1525-1830ml (1ml/kcal)


     Nutritional Problem


      No current Nutrition Prob


       Problem                                   N/A


     Malnutrition Alert


      Is there a minimum of two criteria         No


       selected?                                 


       Query Text:Check all the applicable       


       criteria. A minimum of two criteria are   


       recommended for diagnosis of either       


       severe or non-severe malnutrition.        


     Malnutrition Related to Morbid Obesity


      Malnutrition related to morbid obesity     No


     Intervention/Recommendation


      Comments                                   1. Continue with [facility] soft


                                                 chopped bland diet as ordered.


                                                 2. Monitor PO intake, wt, labs


                                                 and skin integrity


                                                 3. F/U as low risk in 7 days,


                                                 


     Expected Outcomes/Goals


      Expected Outcomes/Goals                    1. PO intake to meet at least


                                                 75% of nutritional needs.


                                                 2. Wt stability, skin to


                                                 remain intact, labs WNL.

## 2018-08-04 RX ADMIN — DILTIAZEM HYDROCHLORIDE SCH: 30 TABLET, FILM COATED ORAL at 08:42

## 2018-08-04 RX ADMIN — PANTOPRAZOLE SODIUM SCH MG: 40 TABLET, DELAYED RELEASE ORAL at 16:18

## 2018-08-04 RX ADMIN — PANTOPRAZOLE SODIUM SCH MG: 40 TABLET, DELAYED RELEASE ORAL at 08:42

## 2018-08-04 NOTE — PROGRESS NOTES
DATE:  08/01/2018



DATE:  08/01/2018



SUBJECTIVE:  Chart reviewed and the patient interviewed.  Also discussed the

patient's condition with the staff and reviewed records and labs.  The patient

is still confused.  Also, is still forgetful and he is still easily agitated and

aggressive, also is impulsive and he is having severe mood swings.  Also still

needs lots of redirections.  Otherwise, the patient is compliant with taking his

medications with no side effects of Depakote, Seroquel, Remeron or Zyprexa.



ASSESSMENT:  The patient is still psychotic and easily agitated.



TREATMENT PLAN:  Continue to monitor his behavior and his condition closely. 

Also, continue to work on his poor impulse control and his impulsivity and

continue to follow up closely.





DD: 08/03/2018 18:31

DT: 08/04/2018 09:50

JOB# [id number]  [id number]

## 2018-08-04 NOTE — PROGRESS NOTES
DATE:  08/02/2018



SUBJECTIVE:  Chart reviewed and the patient interviewed.  Also discussed the

patient's condition with the staff and reviewed records and labs.  The patient

is still agitated.  The patient also is suspicious and paranoid and selectively

mute.  He also is demanding.  The patient also has severe mood swings. 

Otherwise, the patient is compliant with taking his medications with no side

effects of medications.



ASSESSMENT:  The patient is still irritable and is still agitated.



TREATMENT PLAN:  Continue to monitor his behavior and his condition closely. 

Also, continue to work on his irritability and his anger.  Also, we will

increase Seroquel to 150 mg twice a day and 150 mg at bedtime and we will

discontinue Zyprexa.  Also, continue Depakote and Remeron same dose.





DD: 08/03/2018 21:07

DT: 08/04/2018 18:56

JOB# [id number]  [id number]

## 2018-08-04 NOTE — PROGRESS NOTES
DATE:  08/03/2018



DATE:  08/03/2018



SUBJECTIVE:  Chart reviewed and the patient interviewed.  Also discussed the

patient's condition with the staff and reviewed records and labs.  The patient

is still restless and is still depressed.  The patient also is still withdrawn

and is still easily agitated.  Also, is impulsive and demanding.  The patient

also is responding to stimuli.  Also, during my interview, the patient has been

mumbling and talking to himself.  Otherwise, no side effects of medications and

he is compliant with taking his medications regularly.



ASSESSMENT:  The patient is still agitated and depressed.



TREATMENT PLAN:  Continue to monitor behavior and condition closely.  Also, we

will get Depakote blood level.  Also, I increased the Seroquel yesterday and

stopped the Zyprexa and continue adjusting psychotropic medications.





DD: 08/03/2018 21:35

DT: 08/04/2018 19:40

JOB# [id number]  [id number]

## 2018-08-04 NOTE — INTERNAL MEDICINE PROG NOTE
Internal Medicine Subjective





- Subjective


Service Date: 18


Patient seen and examined:: with staff


Patient is:: awake, non-interactive, in bed, agitated


Patient Complaints of:: unable to sleep


Per staff patient has:: no adverse event





Internal Medicine Objective





- Results


Result Diagrams: 


 18 07:55





 18 07:55


Recent Labs: 


 Laboratory Last Values











WBC  8.4 Th/cmm (4.8-10.8)   18  07:55    


 


RBC  4.08 Mil/cmm (3.80-5.80)   18  07:55    


 


Hgb  13.0 gm/dL (12-16)   18  07:55    


 


Hct  38.5 % (41.0-60)  L  18  07:55    


 


MCV  94.5 fl (80-99)   18  07:55    


 


MCH  31.8 pg (27.0-31.0)  H  18  07:55    


 


MCHC Differential  33.6 pg (28.0-36.0)   18  07:55    


 


RDW  14.9 % (11.5-20.0)   18  07:55    


 


Plt Count  303 Th/cmm (150-400)   18  07:55    


 


MPV  7.1 fl  18  07:55    


 


Neutrophils %  55.0 % (40.0-80.0)   18  07:55    


 


Lymphocytes %  31.6 % (20.0-50.0)   18  07:55    


 


Monocytes %  10.9 % (2.0-10.0)  H  18  07:55    


 


Eosinophils %  1.7 % (0.0-5.0)   18  07:55    


 


Basophils %  0.8 % (0.0-2.0)   18  07:55    


 


Sodium  138 mEq/L (136-145)   18  07:55    


 


Potassium  4.2 mEq/L (3.5-5.1)   18  07:55    


 


Chloride  105 mEq/L ()   18  07:55    


 


Carbon Dioxide  26.4 mEq/L (21.0-31.0)   18  07:55    


 


Anion Gap  10.8  (7.0-16.0)   18  07:55    


 


BUN  14 mg/dL (7-25)   18  07:55    


 


Creatinine  0.8 mg/dL (0.7-1.3)   18  07:55    


 


Est GFR ( Amer)  > 60.0 ml/min (>90)   18  07:55    


 


Est GFR (Non-Af Amer)  > 60.0 ml/min  18  07:55    


 


BUN/Creatinine Ratio  17.5   18  07:55    


 


Glucose  87 mg/dL ()   18  07:55    


 


Calcium  9.4 mg/dL (8.6-10.3)   18  07:55    


 


Valproic Acid  33.5 ug/mL (50.0-100.0)  L  18  10:05    














- Physical Exam


Vitals and I&O: 


 Vital Signs











Temp  98 F   18 20:36


 


Pulse  92   18 20:36


 


Resp  18   18 20:36


 


BP  126/80   18 20:36


 


Pulse Ox  94   18 20:36








 Intake & Output











 18





 06:59 18:59 06:59


 


Intake Total 120 1000 240


 


Balance 120 1000 240


 


Intake:   


 


  Oral 120 1000 240


 


Other:   


 


  # Voids 3 4 1


 


  # Bowel Movements 0 1 











Active Medications: 


Current Medications





Acetaminophen (Tylenol)  650 mg PO Q4HR PRN


   PRN Reason: Mild Pain / Temp above 100


   Stop: 18 01:46


Al Hydrox/Mg Hydrox/Simethicone (Maalox)  30 ml PO Q4HR PRN


   PRN Reason: GI DISTRESS


   Stop: 18 01:46


Albuterol/Ipratropium (Duoneb Neb)  3 ml HHN Q2HRT PRN


   PRN Reason: Wheezing


   Stop: 18 08:00


Amiodarone HCl (Cordarone)  200 mg PO DAILY [facility]


   Stop: 18 08:59


   Last Admin: 18 08:41 Dose:  Not Given


Cyanocobalamin (Vitamin B12)  1,000 mcg PO DAILY [facility]ADD[facility]


   Stop: 18 08:59


   Last Admin: 18 08:41 Dose:  1,000 mcg


Diltiazem HCl (Cardizem)  60 mg PO DAILY [facility]


   Stop: 18 08:59


   Last Admin: 18 08:42 Dose:  Not Given


Divalproex Sodium (Depakote Dr)  500 mg PO BID [facility]; Protocol


   Stop: 18 08:59


   Last Admin: 18 16:18 Dose:  500 mg


Docusate Sodium (Colace)  100 mg PO BID PRN


   PRN Reason: CONSTIPATION


   Stop: 18 08:00


Ferrous Sulfate (Iron)  325 mg PO DAILY [facility]


   Stop: 18 08:59


   Last Admin: 18 08:42 Dose:  325 mg


Gabapentin (Neurontin)  300 mg PO TID [facility]


   Stop: 18 08:59


   Last Admin: 18 21:07 Dose:  300 mg


Lorazepam (Ativan)  0.5 mg PO Q4HR PRN; Protocol


   PRN Reason: Agitation


   Stop: 18 01:59


   Last Admin: 18 08:15 Dose:  0.5 mg


Magnesium Hydroxide (Milk Of Magnesia)  30 ml PO HS PRN


   PRN Reason: Constipation


Mirtazapine (Remeron)  15 mg PO HS [facility]; Protocol


   Stop: 18 20:59


   Last Admin: 18 21:07 Dose:  15 mg


Multivitamins/Vitamin C (Theragran)  1 tab PO DAILY [facility]


   Stop: 18 08:59


   Last Admin: 18 08:42 Dose:  1 tab


Ondansetron HCl (Zofran Odt)  4 mg PO Q6H PRN


   PRN Reason: Nausea / Vomiting


   Stop: 18 05:59


   Last Admin: 18 06:36 Dose:  4 mg


Oxybutynin Chloride (Ditropan)  5 mg PO HS [facility]


   Stop: 18 20:59


   Last Admin: 18 21:07 Dose:  5 mg


Pantoprazole Sodium (Protonix)  40 mg PO BID [facility]


   Stop: 18 06:59


   Last Admin: 18 16:18 Dose:  40 mg


Quetiapine Fumarate (Seroquel)  200 mg PO BID [facility]; Protocol


   Stop: 10/03/18 08:59


   Last Admin: 18 16:18 Dose:  200 mg


Quetiapine Fumarate (Seroquel)  200 mg PO HS [facility]ADD[facility]; Protocol


   Stop: 10/03/18 20:59


   Last Admin: 18 21:07 Dose:  200 mg


Sodium Chloride (Nacl Tab)  1 gm PO BID [facility]


   Stop: 18 08:59


   Last Admin: 18 16:18 Dose:  1 gm


Tamsulosin HCl (Flomax)  0.4 mg PO DAILY [facility]


   Stop: 18 08:59


   Last Admin: 18 08:42 Dose:  0.4 mg


Zolpidem Tartrate (Ambien)  5 mg PO HS PRN


   PRN Reason: Insomnia


   Stop: 18 01:46


   Last Admin: 18 21:08 Dose:  5 mg








General: weak, lethargic, congested, demented


HEENT: NC/AT, PERRLA, EOMI, anicteric sclerae, throat clear, thinning hair


Neck: Supple, No JVD, No LAD


Lungs: wheezing, ronchi


Cardiovascular: RRR, Normal S1


Abdomen: soft, non-tender, non-distended


Extremities: clear


Neurological: no change





Internal Medicine Assmt/Plan





- Assessment


Assessment: 


Noncompliance: education provided.





PNA: improving? pt refuses IVPB ABX.





ALOC: on and off; observe closely.





H/O A. Fib: rate controlled.





Acute Psychosis: follow recommendation by Psychiatrist.





Insomnia: on and off.





Agitation: sitter PRN





Leukocytosis: resolving?





s/p recent UGIB.





Nutritional Asmnt/Malnutr-PDOC





- Dietary Evaluation


Malnutrition Findings (Please click <Entered> for more info): 








Nutritional Asmnt/Malnutrition                             Start:  18 13:

17


Text:                                                      Status: Complete    

  


Freq:                                                                          

  


Protocol:                                                                      

  


 Document     18 13:17  AMY  (Rec: 18 13:22  AMY  RACHELLE-FNS1)


 Nutritional Asmnt/Malnutrition


     Patient General Information


      Nutritional Screening                      Moderate Risk


      Diagnosis                                  psychosis


      Pertinent Medical Hx/Surgical Hx           CAD, s/p MI, afib, anemia, BPH


                                                 , COPD, PNA, psychosis,


                                                 anxiety, depression


      Subjective Information                     Pt seen sleeping in bed at


                                                 time of visit. Per EMR, PO


                                                 intake 100% of meals. Per


                                                 nurse note, pt had episode of


                                                 vomiting x 1 last night.


      Current Diet Order/ Nutrition Support      [facility] soft chopped bland


      Pertinent Medications                      vit B12, colace, iron, remeron


                                                 , theragran, protonix, nacl


                                                 tab, seroquel


      Pertinent Labs                              nutritin labs WNL


     Nutritional Hx/Data


      Height                                     1.75 m


      Height (Calculated Centimeters)            175.3


      Current Weight (lbs)                       61.235 kg


      Weight (Calculated Kilograms)              61.2


      Weight (Calculated Grams)                  92657.0


      Ideal Body Weight                          160


      Body Mass Index (BMI)                      19.9


      Weight Status                              Approriate


     GI Symptoms


      GI Symptoms                                None


      Last BM                                    


      Difficult in:                              None


      Skin Integrity/Comment:                    intact


      Current %PO                                Good (%)


     Estimated Nutritional Goals


      BEE in Kcals:                              Using Current wt


      Calories/Kcals/Kg                          25-30


      Kcals Calculated                           1546-1326


      Protein:                                   Using Current wt


      Protein g/k


      Protein Calculated                         61


      Fluid: ml                                  1525-1830ml (1ml/kcal)


     Nutritional Problem


      No current Nutrition Prob


       Problem                                   N/A


     Malnutrition Alert


      Is there a minimum of two criteria         No


       selected?                                 


       Query Text:Check all the applicable       


       criteria. A minimum of two criteria are   


       recommended for diagnosis of either       


       severe or non-severe malnutrition.        


     Malnutrition Related to Morbid Obesity


      Malnutrition related to morbid obesity     No


     Intervention/Recommendation


      Comments                                   1. Continue with [facility] soft


                                                 chopped bland diet as ordered.


                                                 2. Monitor PO intake, wt, labs


                                                 and skin integrity


                                                 3. F/U as low risk in 7 days,


                                                 


     Expected Outcomes/Goals


      Expected Outcomes/Goals                    1. PO intake to meet at least


                                                 75% of nutritional needs.


                                                 2. Wt stability, skin to


                                                 remain intact, labs WNL.

## 2018-08-05 RX ADMIN — DILTIAZEM HYDROCHLORIDE SCH MG: 30 TABLET, FILM COATED ORAL at 08:39

## 2018-08-05 RX ADMIN — PANTOPRAZOLE SODIUM SCH MG: 40 TABLET, DELAYED RELEASE ORAL at 16:29

## 2018-08-05 RX ADMIN — PANTOPRAZOLE SODIUM SCH MG: 40 TABLET, DELAYED RELEASE ORAL at 08:36

## 2018-08-06 RX ADMIN — PANTOPRAZOLE SODIUM SCH MG: 40 TABLET, DELAYED RELEASE ORAL at 08:33

## 2018-08-06 RX ADMIN — DILTIAZEM HYDROCHLORIDE SCH: 30 TABLET, FILM COATED ORAL at 08:34

## 2018-08-06 RX ADMIN — PANTOPRAZOLE SODIUM SCH MG: 40 TABLET, DELAYED RELEASE ORAL at 16:43

## 2018-08-06 NOTE — INTERNAL MEDICINE PROG NOTE
Internal Medicine Subjective





- Subjective


Service Date: 18


Patient seen and examined:: without staff


Patient is:: awake, non-interactive, in bed, agitated


Patient Complaints of:: unable to sleep


Per staff patient has:: no adverse event





Internal Medicine Objective





- Results


Result Diagrams: 


 18 07:55





 18 07:55


Recent Labs: 


 Laboratory Last Values











WBC  8.4 Th/cmm (4.8-10.8)   18  07:55    


 


RBC  4.08 Mil/cmm (3.80-5.80)   18  07:55    


 


Hgb  13.0 gm/dL (12-16)   18  07:55    


 


Hct  38.5 % (41.0-60)  L  18  07:55    


 


MCV  94.5 fl (80-99)   18  07:55    


 


MCH  31.8 pg (27.0-31.0)  H  18  07:55    


 


MCHC Differential  33.6 pg (28.0-36.0)   18  07:55    


 


RDW  14.9 % (11.5-20.0)   18  07:55    


 


Plt Count  303 Th/cmm (150-400)   18  07:55    


 


MPV  7.1 fl  18  07:55    


 


Neutrophils %  55.0 % (40.0-80.0)   18  07:55    


 


Lymphocytes %  31.6 % (20.0-50.0)   18  07:55    


 


Monocytes %  10.9 % (2.0-10.0)  H  18  07:55    


 


Eosinophils %  1.7 % (0.0-5.0)   18  07:55    


 


Basophils %  0.8 % (0.0-2.0)   18  07:55    


 


Sodium  138 mEq/L (136-145)   18  07:55    


 


Potassium  4.2 mEq/L (3.5-5.1)   18  07:55    


 


Chloride  105 mEq/L ()   18  07:55    


 


Carbon Dioxide  26.4 mEq/L (21.0-31.0)   18  07:55    


 


Anion Gap  10.8  (7.0-16.0)   18  07:55    


 


BUN  14 mg/dL (7-25)   18  07:55    


 


Creatinine  0.8 mg/dL (0.7-1.3)   18  07:55    


 


Est GFR ( Amer)  > 60.0 ml/min (>90)   18  07:55    


 


Est GFR (Non-Af Amer)  > 60.0 ml/min  18  07:55    


 


BUN/Creatinine Ratio  17.5   18  07:55    


 


Glucose  87 mg/dL ()   18  07:55    


 


Calcium  9.4 mg/dL (8.6-10.3)   18  07:55    


 


Valproic Acid  33.5 ug/mL (50.0-100.0)  L  18  10:05    














- Physical Exam


Vitals and I&O: 


 Vital Signs











Temp  97.8 F   18 20:15


 


Pulse  99   18 20:15


 


Resp  20   18 20:15


 


BP  98/70   18 20:15


 


Pulse Ox  97   18 20:15








 Intake & Output











 18





 06:59 18:59 06:59


 


Intake Total 600  240


 


Balance 600  240


 


Intake:   


 


  Oral 600  240


 


Other:   


 


  # Voids 2 3 1


 


  # Bowel Movements 0 1 











Active Medications: 


Current Medications





Acetaminophen (Tylenol)  650 mg PO Q4HR PRN


   PRN Reason: Mild Pain / Temp above 100


   Stop: 18 01:46


Al Hydrox/Mg Hydrox/Simethicone (Maalox)  30 ml PO Q4HR PRN


   PRN Reason: GI DISTRESS


   Stop: 18 01:46


Albuterol/Ipratropium (Duoneb Neb)  3 ml HHN Q2HRT PRN


   PRN Reason: Wheezing


   Stop: 18 08:00


Amiodarone HCl (Cordarone)  200 mg PO DAILY [facility]


   Stop: 18 08:59


   Last Admin: 18 08:33 Dose:  Not Given


Cyanocobalamin (Vitamin B12)  1,000 mcg PO DAILY [facility]


   Stop: 18 08:59


   Last Admin: 18 08:32 Dose:  1,000 mcg


Diltiazem HCl (Cardizem)  60 mg PO DAILY [facility]


   Stop: 18 08:59


   Last Admin: 18 08:34 Dose:  Not Given


Divalproex Sodium (Depakote Dr)  500 mg PO BID [facility]; Protocol


   Stop: 18 08:59


   Last Admin: 18 16:43 Dose:  500 mg


Docusate Sodium (Colace)  100 mg PO BID PRN


   PRN Reason: CONSTIPATION


   Stop: 18 08:00


Ferrous Sulfate (Iron)  325 mg PO DAILY [facility]


   Stop: 18 08:59


   Last Admin: 18 08:33 Dose:  325 mg


Gabapentin (Neurontin)  300 mg PO TID [facility]


   Stop: 18 08:59


   Last Admin: 18 22:00 Dose:  Not Given


Magnesium Hydroxide (Milk Of Magnesia)  30 ml PO HS PRN


   PRN Reason: Constipation


Mirtazapine (Remeron)  15 mg PO [facility]; Protocol


   Stop: 18 20:59


   Last Admin: 18 22:00 Dose:  Not Given


Multivitamins/Vitamin C (Theragran)  1 tab PO DAILY [facility]


   Stop: 18 08:59


   Last Admin: 18 08:32 Dose:  1 tab


Ondansetron HCl (Zofran Odt)  4 mg PO Q6H PRN


   PRN Reason: Nausea / Vomiting


   Stop: 18 05:59


   Last Admin: 18 06:36 Dose:  4 mg


Oxybutynin Chloride (Ditropan)  5 mg PO [facility]


   Stop: 18 20:59


   Last Admin: 18 22:00 Dose:  Not Given


Pantoprazole Sodium (Protonix)  40 mg PO BID [facility]


   Stop: 18 06:59


   Last Admin: 18 16:43 Dose:  40 mg


Quetiapine Fumarate (Seroquel)  200 mg PO HS [facility]; Protocol


   Stop: 10/03/18 20:59


   Last Admin: 18 22:00 Dose:  Not Given


Quetiapine Fumarate (Seroquel)  200 mg PO BID [facility]; Protocol


   Stop: 10/03/18 16:59


   Last Admin: 18 16:43 Dose:  200 mg


Sodium Chloride (Nacl Tab)  1 gm PO BID [facility]


   Stop: 18 08:59


   Last Admin: 18 16:43 Dose:  1 gm


Tamsulosin HCl (Flomax)  0.4 mg PO DAILY [facility]


   Stop: 18 08:59


   Last Admin: 18 08:33 Dose:  0.4 mg








General: weak, lethargic, congested, demented


HEENT: NC/AT, PERRLA, EOMI, anicteric sclerae, throat clear, thinning hair


Neck: Supple, No JVD, No LAD


Lungs: wheezing, ronchi


Cardiovascular: RRR, Normal S1


Abdomen: soft, non-tender, non-distended


Extremities: clear


Neurological: no change





Internal Medicine Assmt/Plan





- Assessment


Assessment: 


ALOC: on and off; observe closely.





Insomnia: on and off. Ambien PRN.





Noncompliance: education provided.





PNA: improving? pt refuses IVPB ABX.





H/O A. Fib: rate controlled.





Acute Psychosis: follow recommendation by Psychiatrist.





Agitation: sitter PRN





Leukocytosis: resolving?





s/p recent UGIB.





Nutritional Asmnt/Malnutr-PDOC





- Dietary Evaluation


Malnutrition Findings (Please click <Entered> for more info): 








Nutritional Asmnt/Malnutrition                             Start:  18 13:

17


Text:                                                      Status: Complete    

  


Freq:                                                                          

  


Protocol:                                                                      

  


 Document     18 13:17  LCHENG  (Rec: 18 13:22  LCHENG  [email or web address])


 Nutritional Asmnt/Malnutrition


     Patient General Information


      Nutritional Screening                      Moderate Risk


      Diagnosis                                  psychosis


      Pertinent Medical Hx/Surgical Hx           CAD, s/p MI, afib, anemia, BPH


                                                 , COPD, PNA, psychosis,


                                                 anxiety, depression


      Subjective Information                     Pt seen sleeping in bed at


                                                 time of visit. Per EMR, PO


                                                 intake 100% of meals. Per


                                                 nurse note, pt had episode of


                                                 vomiting x 1 last night.


      Current Diet Order/ Nutrition Support      [facility] soft chopped bland


      Pertinent Medications                      vit B12, colace, iron, remeron


                                                 , theragran, protonix, nacl


                                                 tab, seroquel


      Pertinent Labs                              nutritin labs WNL


     Nutritional Hx/Data


      Height                                     1.75 m


      Height (Calculated Centimeters)            175.3


      Current Weight (lbs)                       61.235 kg


      Weight (Calculated Kilograms)              61.2


      Weight (Calculated Grams)                  67960.0


      Ideal Body Weight                          160


      Body Mass Index (BMI)                      19.9


      Weight Status                              Approriate


     GI Symptoms


      GI Symptoms                                None


      Last BM                                    


      Difficult in:                              None


      Skin Integrity/Comment:                    intact


      Current %PO                                Good (%)


     Estimated Nutritional Goals


      BEE in Kcals:                              Using Current wt


      Calories/Kcals/Kg                          25-30


      Kcals Calculated                           6459-4292


      Protein:                                   Using Current wt


      Protein g/k


      Protein Calculated                         61


      Fluid: ml                                  1525-1830ml (1ml/kcal)


     Nutritional Problem


      No current Nutrition Prob


       Problem                                   N/A


     Malnutrition Alert


      Is there a minimum of two criteria         No


       selected?                                 


       Query Text:Check all the applicable       


       criteria. A minimum of two criteria are   


       recommended for diagnosis of either       


       severe or non-severe malnutrition.        


     Malnutrition Related to Morbid Obesity


      Malnutrition related to morbid obesity     No


     Intervention/Recommendation


      Comments                                   1. Continue with [facility] soft


                                                 chopped bland diet as ordered.


                                                 2. Monitor PO intake, wt, labs


                                                 and skin integrity


                                                 3. F/U as low risk in 7 days,


                                                 


     Expected Outcomes/Goals


      Expected Outcomes/Goals                    1. PO intake to meet at least


                                                 75% of nutritional needs.


                                                 2. Wt stability, skin to


                                                 remain intact, labs WNL.

## 2018-08-06 NOTE — PROGRESS NOTES
DATE:  08/04/2018



DATE:  08/04/2018



PSYCHIATRIC PROGRESS NOTE



SUBJECTIVE:  Chart reviewed and the patient interviewed.  Also discussed the

patient's condition with the staff and reviewed records and labs.  The patient

continued to be extremely agitated and is still verbally abusive to staff and

other patients.  He also is still demanding and anxious.  The patient also

continued slamming doors and difficulty following any of staff directions.  The

patient had to be given Haldol and Ativan on an emergency basis to calm him

down.  On the other hand, the patient continued to comply with taking his

medications with no side effect of medications.



ASSESSMENT:  The patient is still agitated and psychotic and he can be dangerous

to others.



TREATMENT PLAN:  We will increase Seroquel to 200 mg twice a day and 200 mg at

bedtime.  Also, Depakote blood level that was done on 08/03/2018, to come back

to be 33.7, which is below therapeutic level.  We will repeat Depakote blood

level and we will continue to follow his condition and medications closely.





DD: 08/06/2018 12:19

DT: 08/06/2018 12:33

[facility]# [id number]  [id number]

## 2018-08-06 NOTE — INTERNAL MEDICINE PROG NOTE
Internal Medicine Subjective





- Subjective


Service Date: 18 (late entry due to internet problem at home)


Patient is:: awake, non-interactive, in bed, agitated


Patient Complaints of:: unable to sleep


Per staff patient has:: no adverse event





Internal Medicine Objective





- Results


Result Diagrams: 


 18 07:55





 18 07:55


Recent Labs: 


 Laboratory Last Values











WBC  8.4 Th/cmm (4.8-10.8)   18  07:55    


 


RBC  4.08 Mil/cmm (3.80-5.80)   18  07:55    


 


Hgb  13.0 gm/dL (12-16)   18  07:55    


 


Hct  38.5 % (41.0-60)  L  18  07:55    


 


MCV  94.5 fl (80-99)   18  07:55    


 


MCH  31.8 pg (27.0-31.0)  H  18  07:55    


 


MCHC Differential  33.6 pg (28.0-36.0)   18  07:55    


 


RDW  14.9 % (11.5-20.0)   18  07:55    


 


Plt Count  303 Th/cmm (150-400)   18  07:55    


 


MPV  7.1 fl  18  07:55    


 


Neutrophils %  55.0 % (40.0-80.0)   18  07:55    


 


Lymphocytes %  31.6 % (20.0-50.0)   18  07:55    


 


Monocytes %  10.9 % (2.0-10.0)  H  18  07:55    


 


Eosinophils %  1.7 % (0.0-5.0)   18  07:55    


 


Basophils %  0.8 % (0.0-2.0)   18  07:55    


 


Sodium  138 mEq/L (136-145)   18  07:55    


 


Potassium  4.2 mEq/L (3.5-5.1)   18  07:55    


 


Chloride  105 mEq/L ()   18  07:55    


 


Carbon Dioxide  26.4 mEq/L (21.0-31.0)   18  07:55    


 


Anion Gap  10.8  (7.0-16.0)   18  07:55    


 


BUN  14 mg/dL (7-25)   18  07:55    


 


Creatinine  0.8 mg/dL (0.7-1.3)   18  07:55    


 


Est GFR ( Amer)  > 60.0 ml/min (>90)   18  07:55    


 


Est GFR (Non-Af Amer)  > 60.0 ml/min  18  07:55    


 


BUN/Creatinine Ratio  17.5   18  07:55    


 


Glucose  87 mg/dL ()   18  07:55    


 


Calcium  9.4 mg/dL (8.6-10.3)   18  07:55    


 


Valproic Acid  33.5 ug/mL (50.0-100.0)  L  18  10:05    














- Physical Exam


Vitals and I&O: 


 Vital Signs











Temp  98.2 F   18 06:30


 


Pulse  77   18 08:34


 


Resp  14   18 07:31


 


BP  115/72   18 06:30


 


Pulse Ox  91   18 07:31








 Intake & Output











 18





 18:59 06:59 18:59


 


Intake Total 900 600 


 


Balance 900 600 


 


Intake:   


 


  Oral 900 600 


 


Other:   


 


  # Voids 4 2 


 


  # Bowel Movements 1 0 











Active Medications: 


Current Medications





Acetaminophen (Tylenol)  650 mg PO Q4HR PRN


   PRN Reason: Mild Pain / Temp above 100


   Stop: 18 01:46


Al Hydrox/Mg Hydrox/Simethicone (Maalox)  30 ml PO Q4HR PRN


   PRN Reason: GI DISTRESS


   Stop: 18 01:46


Albuterol/Ipratropium (Duoneb Neb)  3 ml HHN Q2HRT PRN


   PRN Reason: Wheezing


   Stop: 18 08:00


Amiodarone HCl (Cordarone)  200 mg PO DAILY [facility]


   Stop: 18 08:59


   Last Admin: 18 08:33 Dose:  Not Given


Cyanocobalamin (Vitamin B12)  1,000 mcg PO DAILY [facility]


   Stop: 18 08:59


   Last Admin: 18 08:32 Dose:  1,000 mcg


Diltiazem HCl (Cardizem)  60 mg PO DAILY [facility]


   Stop: 18 08:59


   Last Admin: 18 08:34 Dose:  Not Given


Divalproex Sodium (Depakote Dr)  500 mg PO BID [facility]; Protocol


   Stop: 18 08:59


   Last Admin: 18 08:33 Dose:  500 mg


Docusate Sodium (Colace)  100 mg PO BID PRN


   PRN Reason: CONSTIPATION


   Stop: 18 08:00


Ferrous Sulfate (Iron)  325 mg PO DAILY [facility]


   Stop: 18 08:59


   Last Admin: 18 08:33 Dose:  325 mg


Gabapentin (Neurontin)  300 mg PO TID [facility]


   Stop: 18 08:59


   Last Admin: 18 08:32 Dose:  300 mg


Magnesium Hydroxide (Milk Of Magnesia)  30 ml PO HS PRN


   PRN Reason: Constipation


Mirtazapine (Remeron)  15 mg PO HS [facility]; Protocol


   Stop: 18 20:59


   Last Admin: 18 21:25 Dose:  15 mg


Multivitamins/Vitamin C (Theragran)  1 tab PO DAILY [facility]


   Stop: 18 08:59


   Last Admin: 18 08:32 Dose:  1 tab


Ondansetron HCl (Zofran Odt)  4 mg PO Q6H PRN


   PRN Reason: Nausea / Vomiting


   Stop: 18 05:59


   Last Admin: 18 06:36 Dose:  4 mg


Oxybutynin Chloride (Ditropan)  5 mg PO [facility]


   Stop: 18 20:59


   Last Admin: 18 21:25 Dose:  5 mg


Pantoprazole Sodium (Protonix)  40 mg PO BID [facility]


   Stop: 18 06:59


   Last Admin: 18 08:33 Dose:  40 mg


Quetiapine Fumarate (Seroquel)  200 mg PO BID [facility]; Protocol


   Stop: 10/03/18 08:59


   Last Admin: 18 08:32 Dose:  200 mg


Quetiapine Fumarate (Seroquel)  200 mg PO HS [facility]; Protocol


   Stop: 10/03/18 20:59


   Last Admin: 18 21:24 Dose:  200 mg


Sodium Chloride (Nacl Tab)  1 gm PO BID [facility]


   Stop: 18 08:59


   Last Admin: 18 08:32 Dose:  1 gm


Tamsulosin HCl (Flomax)  0.4 mg PO DAILY [facility]


   Stop: 18 08:59


   Last Admin: 18 08:33 Dose:  0.4 mg








General: weak, lethargic, congested, demented


HEENT: NC/AT, PERRLA, EOMI, anicteric sclerae, throat clear, thinning hair


Neck: Supple, No JVD, No LAD


Lungs: wheezing, ronchi


Cardiovascular: RRR, Normal S1


Abdomen: soft, non-tender, non-distended


Extremities: clear


Neurological: no change





Internal Medicine Assmt/Plan





- Assessment


Assessment: 


Insomnia: on and off. Ambien PRN.





Noncompliance: education provided.





PNA: improving? pt refuses IVPB ABX.





ALOC: on and off; observe closely.





H/O A. Fib: rate controlled.





Acute Psychosis: follow recommendation by Psychiatrist.





Agitation: sitter PRN





Leukocytosis: resolving?





s/p recent UGIB.





Nutritional Asmnt/Malnutr-PDOC





- Dietary Evaluation


Malnutrition Findings (Please click <Entered> for more info): 








Nutritional Asmnt/Malnutrition                             Start:  18 13:

17


Text:                                                      Status: Complete    

  


Freq:                                                                          

  


Protocol:                                                                      

  


 Document     18 13:17  LCHENG  (Rec: 18 13:22  LCHENG  [email or web address])


 Nutritional Asmnt/Malnutrition


     Patient General Information


      Nutritional Screening                      Moderate Risk


      Diagnosis                                  psychosis


      Pertinent Medical Hx/Surgical Hx           CAD, s/p MI, afib, anemia, BPH


                                                 , COPD, PNA, psychosis,


                                                 anxiety, depression


      Subjective Information                     Pt seen sleeping in bed at


                                                 time of visit. Per EMR, PO


                                                 intake 100% of meals. Per


                                                 nurse note, pt had episode of


                                                 vomiting x 1 last night.


      Current Diet Order/ Nutrition Support      [facility] soft chopped bland


      Pertinent Medications                      vit B12, colace, iron, remeron


                                                 , theragran, protonix, nacl


                                                 tab, seroquel


      Pertinent Labs                              nutritin labs WNL


     Nutritional Hx/Data


      Height                                     1.75 m


      Height (Calculated Centimeters)            175.3


      Current Weight (lbs)                       61.235 kg


      Weight (Calculated Kilograms)              61.2


      Weight (Calculated Grams)                  91279.0


      Ideal Body Weight                          160


      Body Mass Index (BMI)                      19.9


      Weight Status                              Approriate


     GI Symptoms


      GI Symptoms                                None


      Last BM                                    


      Difficult in:                              None


      Skin Integrity/Comment:                    intact


      Current %PO                                Good (%)


     Estimated Nutritional Goals


      BEE in Kcals:                              Using Current wt


      Calories/Kcals/Kg                          25-30


      Kcals Calculated                           4018-1775


      Protein:                                   Using Current wt


      Protein g/k


      Protein Calculated                         61


      Fluid: ml                                  1525-1830ml (1ml/kcal)


     Nutritional Problem


      No current Nutrition Prob


       Problem                                   N/A


     Malnutrition Alert


      Is there a minimum of two criteria         No


       selected?                                 


       Query Text:Check all the applicable       


       criteria. A minimum of two criteria are   


       recommended for diagnosis of either       


       severe or non-severe malnutrition.        


     Malnutrition Related to Morbid Obesity


      Malnutrition related to morbid obesity     No


     Intervention/Recommendation


      Comments                                   1. Continue with [facility] soft


                                                 chopped bland diet as ordered.


                                                 2. Monitor PO intake, wt, labs


                                                 and skin integrity


                                                 3. F/U as low risk in 7 days,


                                                 


     Expected Outcomes/Goals


      Expected Outcomes/Goals                    1. PO intake to meet at least


                                                 75% of nutritional needs.


                                                 2. Wt stability, skin to


                                                 remain intact, labs WNL.

## 2018-08-07 LAB
ANION GAP SERPL CALC-SCNC: 15.5 MMOL/L (ref 7–16)
BASOPHILS # BLD AUTO: 0 TH/CUMM (ref 0–0.2)
BASOPHILS NFR BLD AUTO: 0.3 % (ref 0–2)
BUN SERPL-MCNC: 23 MG/DL (ref 7–25)
CALCIUM SERPL-MCNC: 9.2 MG/DL (ref 8.6–10.3)
CHLORIDE SERPL-SCNC: 104 MEQ/L (ref 98–107)
CO2 SERPL-SCNC: 23.4 MEQ/L (ref 21–31)
CREAT SERPL-MCNC: 1 MG/DL (ref 0.7–1.3)
EOSINOPHIL # BLD AUTO: 0 TH/CMM (ref 0.1–0.4)
EOSINOPHIL NFR BLD AUTO: 0.4 % (ref 0–5)
ERYTHROCYTE [DISTWIDTH] IN BLOOD BY AUTOMATED COUNT: 14.9 % (ref 11.5–20)
GLUCOSE SERPL-MCNC: 78 MG/DL (ref 70–105)
HCT VFR BLD CALC: 36.1 % (ref 41–60)
HGB BLD-MCNC: 12.3 GM/DL (ref 12–16)
LYMPHOCYTE AB SER FC-ACNC: 1.1 TH/CMM (ref 1.5–3)
LYMPHOCYTES NFR BLD AUTO: 14.5 % (ref 20–50)
MCH RBC QN AUTO: 31.8 PG (ref 27–31)
MCHC RBC AUTO-ENTMCNC: 34 PG (ref 28–36)
MCV RBC AUTO: 93.7 FL (ref 80–99)
MONOCYTES # BLD AUTO: 0.9 TH/CMM (ref 0.3–1)
MONOCYTES NFR BLD AUTO: 12.3 % (ref 2–10)
NEUTROPHILS # BLD: 5.5 TH/CMM (ref 1.8–8)
NEUTROPHILS NFR BLD AUTO: 72.5 % (ref 40–80)
PLATELET # BLD: 235 TH/CMM (ref 150–400)
PMV BLD AUTO: 7.7 FL
POTASSIUM SERPL-SCNC: 3.9 MEQ/L (ref 3.5–5.1)
RBC # BLD AUTO: 3.86 MIL/CMM (ref 3.8–5.8)
SODIUM SERPL-SCNC: 139 MEQ/L (ref 136–145)
WBC # BLD AUTO: 7.5 TH/CMM (ref 4.8–10.8)

## 2018-08-07 RX ADMIN — PANTOPRAZOLE SODIUM SCH: 40 TABLET, DELAYED RELEASE ORAL at 10:36

## 2018-08-07 RX ADMIN — DILTIAZEM HYDROCHLORIDE SCH MG: 30 TABLET, FILM COATED ORAL at 11:29

## 2018-08-07 RX ADMIN — PANTOPRAZOLE SODIUM SCH MG: 40 TABLET, DELAYED RELEASE ORAL at 16:15

## 2018-08-07 NOTE — INTERNAL MEDICINE PROG NOTE
Internal Medicine Subjective





- Subjective


Service Date: 18


Patient seen and examined:: with staff


Patient is:: awake, non-interactive, in bed, agitated


Patient Complaints of:: unable to sleep


Per staff patient has:: no adverse event





Internal Medicine Objective





- Results


Result Diagrams: 


 18 05:45





 18 05:45


Recent Labs: 


 Laboratory Last Values











WBC  7.5 Th/cmm (4.8-10.8)   18  05:45    


 


RBC  3.86 Mil/cmm (3.80-5.80)   18  05:45    


 


Hgb  12.3 gm/dL (12-16)   18  05:45    


 


Hct  36.1 % (41.0-60)  L  18  05:45    


 


MCV  93.7 fl (80-99)   18  05:45    


 


MCH  31.8 pg (27.0-31.0)  H  18  05:45    


 


MCHC Differential  34.0 pg (28.0-36.0)   18  05:45    


 


RDW  14.9 % (11.5-20.0)   18  05:45    


 


Plt Count  235 Th/cmm (150-400)   18  05:45    


 


MPV  7.7 fl  18  05:45    


 


Neutrophils %  72.5 % (40.0-80.0)   18  05:45    


 


Lymphocytes %  14.5 % (20.0-50.0)  L  18  05:45    


 


Monocytes %  12.3 % (2.0-10.0)  H  18  05:45    


 


Eosinophils %  0.4 % (0.0-5.0)   18  05:45    


 


Basophils %  0.3 % (0.0-2.0)   18  05:45    


 


Sodium  139 mEq/L (136-145)   18  05:45    


 


Potassium  3.9 mEq/L (3.5-5.1)   18  05:45    


 


Chloride  104 mEq/L ()   18  05:45    


 


Carbon Dioxide  23.4 mEq/L (21.0-31.0)   18  05:45    


 


Anion Gap  15.5  (7.0-16.0)   18  05:45    


 


BUN  23 mg/dL (7-25)   18  05:45    


 


Creatinine  1.0 mg/dL (0.7-1.3)   18  05:45    


 


Est GFR ( Amer)  > 60.0 ml/min (>90)   18  05:45    


 


Est GFR (Non-Af Amer)  > 60.0 ml/min  18  05:45    


 


BUN/Creatinine Ratio  23.0   18  05:45    


 


Glucose  78 mg/dL ()   18  05:45    


 


Calcium  9.2 mg/dL (8.6-10.3)   18  05:45    


 


Valproic Acid  33.5 ug/mL (50.0-100.0)  L  18  10:05    














- Physical Exam


Vitals and I&O: 


 Vital Signs











Temp  98.5 F   18 20:42


 


Pulse  75   18 20:42


 


Resp  19   18 20:42


 


BP  124/77   18 20:42


 


Pulse Ox  96   18 20:42








 Intake & Output











 18





 06:59 18:59 06:59


 


Intake Total 240  120


 


Balance 240  120


 


Intake:   


 


  Oral 240  120


 


Other:   


 


  # Voids 3 3 3


 


  # Bowel Movements 0 1 0











Active Medications: 


Current Medications





Acetaminophen (Tylenol)  650 mg PO Q4HR PRN


   PRN Reason: Mild Pain / Temp above 100


   Stop: 18 01:46


Al Hydrox/Mg Hydrox/Simethicone (Maalox)  30 ml PO Q4HR PRN


   PRN Reason: GI DISTRESS


   Stop: 18 01:46


Albuterol/Ipratropium (Duoneb Neb)  3 ml HHN Q2HRT PRN


   PRN Reason: Wheezing


   Stop: 18 08:00


Amiodarone HCl (Cordarone)  200 mg PO DAILY [facility]


   Stop: 18 08:59


   Last Admin: 18 11:29 Dose:  200 mg


Cyanocobalamin (Vitamin B12)  1,000 mcg PO DAILY [facility]


   Stop: 18 08:59


   Last Admin: 18 10:36 Dose:  Not Given


Diltiazem HCl (Cardizem)  60 mg PO DAILY [facility]


   Stop: 18 08:59


   Last Admin: 18 11:29 Dose:  60 mg


Divalproex Sodium (Depakote Dr)  500 mg PO BID [facility]; Protocol


   Stop: 18 08:59


   Last Admin: 18 16:16 Dose:  500 mg


Docusate Sodium (Colace)  100 mg PO BID PRN


   PRN Reason: CONSTIPATION


   Stop: 18 08:00


Ferrous Sulfate (Iron)  325 mg PO DAILY [facility]


   Stop: 18 08:59


   Last Admin: 18 10:36 Dose:  Not Given


Gabapentin (Neurontin)  300 mg PO TID [facility]


   Stop: 18 08:59


   Last Admin: 18 20:22 Dose:  300 mg


Magnesium Hydroxide (Milk Of Magnesia)  30 ml PO HS PRN


   PRN Reason: Constipation


Mirtazapine (Remeron)  15 mg PO HS [facility]; Protocol


   Stop: 18 20:59


   Last Admin: 18 20:22 Dose:  15 mg


Multivitamins/Vitamin C (Theragran)  1 tab PO DAILY [facility]


   Stop: 18 08:59


   Last Admin: 18 10:36 Dose:  Not Given


Ondansetron HCl (Zofran Odt)  4 mg PO Q6H PRN


   PRN Reason: Nausea / Vomiting


   Stop: 18 05:59


   Last Admin: 18 23:44 Dose:  4 mg


Oxybutynin Chloride (Ditropan)  5 mg PO HS [facility]


   Stop: 18 20:59


   Last Admin: 18 20:22 Dose:  5 mg


Pantoprazole Sodium (Protonix)  40 mg PO BID [facility]


   Stop: 18 06:59


   Last Admin: 18 16:15 Dose:  40 mg


Quetiapine Fumarate (Seroquel)  200 mg PO HS [facility]; Protocol


   Stop: 10/03/18 20:59


   Last Admin: 18 20:22 Dose:  200 mg


Quetiapine Fumarate (Seroquel)  200 mg PO BID [facility]; Protocol


   Stop: 10/03/18 16:59


   Last Admin: 18 16:16 Dose:  200 mg


Sodium Chloride (Nacl Tab)  1 gm PO BID [facility]


   Stop: 18 08:59


   Last Admin: 18 16:15 Dose:  1 gm


Tamsulosin HCl (Flomax)  0.4 mg PO DAILY [facility]


   Stop: 18 08:59


   Last Admin: 18 10:37 Dose:  Not Given








General: weak, lethargic, congested, demented


HEENT: NC/AT, PERRLA, EOMI, anicteric sclerae, throat clear, thinning hair


Neck: Supple, No JVD, No LAD


Lungs: wheezing, ronchi


Cardiovascular: RRR, Normal S1


Abdomen: soft, non-tender, non-distended


Extremities: clear


Neurological: no change





Internal Medicine Assmt/Plan





- Assessment


Assessment: 


Noncompliance: education provided.





ALOC: on and off; observe closely.





Insomnia: on and off. Ambien PRN.





PNA: improving? pt refuses IVPB ABX.





H/O A. Fib: rate controlled.





Acute Psychosis: follow recommendation by Psychiatrist.





Agitation: sitter PRN





Leukocytosis: resolving?





s/p recent UGIB.





Nutritional Asmnt/Malnutr-PDOC





- Dietary Evaluation


Malnutrition Findings (Please click <Entered> for more info): 








Nutritional Asmnt/Malnutrition                             Start:  18 13:

17


Text:                                                      Status: Complete    

  


Freq:                                                                          

  


Protocol:                                                                      

  


 Document     18 13:17  LCHENG  (Rec: 18 13:22  LCHENG  [email or web address])


 Nutritional Asmnt/Malnutrition


     Patient General Information


      Nutritional Screening                      Moderate Risk


      Diagnosis                                  psychosis


      Pertinent Medical Hx/Surgical Hx           CAD, s/p MI, afib, anemia, BPH


                                                 , COPD, PNA, psychosis,


                                                 anxiety, depression


      Subjective Information                     Pt seen sleeping in bed at


                                                 time of visit. Per EMR, PO


                                                 intake 100% of meals. Per


                                                 nurse note, pt had episode of


                                                 vomiting x 1 last night.


      Current Diet Order/ Nutrition Support      [facility] soft chopped bland


      Pertinent Medications                      vit B12, colace, iron, remeron


                                                 , theragran, protonix, nacl


                                                 tab, seroquel


      Pertinent Labs                              nutritin labs WNL


     Nutritional Hx/Data


      Height                                     1.75 m


      Height (Calculated Centimeters)            175.3


      Current Weight (lbs)                       61.235 kg


      Weight (Calculated Kilograms)              61.2


      Weight (Calculated Grams)                  97335.0


      Ideal Body Weight                          160


      Body Mass Index (BMI)                      19.9


      Weight Status                              Approriate


     GI Symptoms


      GI Symptoms                                None


      Last BM                                    


      Difficult in:                              None


      Skin Integrity/Comment:                    intact


      Current %PO                                Good (%)


     Estimated Nutritional Goals


      BEE in Kcals:                              Using Current wt


      Calories/Kcals/Kg                          25-30


      Kcals Calculated                           9068-0622


      Protein:                                   Using Current wt


      Protein g/k


      Protein Calculated                         61


      Fluid: ml                                  1525-1830ml (1ml/kcal)


     Nutritional Problem


      No current Nutrition Prob


       Problem                                   N/A


     Malnutrition Alert


      Is there a minimum of two criteria         No


       selected?                                 


       Query Text:Check all the applicable       


       criteria. A minimum of two criteria are   


       recommended for diagnosis of either       


       severe or non-severe malnutrition.        


     Malnutrition Related to Morbid Obesity


      Malnutrition related to morbid obesity     No


     Intervention/Recommendation


      Comments                                   1. Continue with [facility] soft


                                                 chopped bland diet as ordered.


                                                 2. Monitor PO intake, wt, labs


                                                 and skin integrity


                                                 3. F/U as low risk in 7 days,


                                                 


     Expected Outcomes/Goals


      Expected Outcomes/Goals                    1. PO intake to meet at least


                                                 75% of nutritional needs.


                                                 2. Wt stability, skin to


                                                 remain intact, labs WNL.

## 2018-08-08 RX ADMIN — PANTOPRAZOLE SODIUM SCH MG: 40 TABLET, DELAYED RELEASE ORAL at 08:40

## 2018-08-08 RX ADMIN — DILTIAZEM HYDROCHLORIDE SCH MG: 30 TABLET, FILM COATED ORAL at 08:44

## 2018-08-08 RX ADMIN — PANTOPRAZOLE SODIUM SCH MG: 40 TABLET, DELAYED RELEASE ORAL at 16:51

## 2018-08-08 NOTE — INTERNAL MEDICINE PROG NOTE
Internal Medicine Subjective





- Subjective


Service Date: 18


Patient seen and examined:: without staff


Patient is:: awake, non-interactive, in bed, agitated


Patient Complaints of:: unable to sleep


Per staff patient has:: no adverse event





Internal Medicine Objective





- Results


Result Diagrams: 


 18 05:45





 18 05:45


Recent Labs: 


 Laboratory Last Values











WBC  7.5 Th/cmm (4.8-10.8)   18  05:45    


 


RBC  3.86 Mil/cmm (3.80-5.80)   18  05:45    


 


Hgb  12.3 gm/dL (12-16)   18  05:45    


 


Hct  36.1 % (41.0-60)  L  18  05:45    


 


MCV  93.7 fl (80-99)   18  05:45    


 


MCH  31.8 pg (27.0-31.0)  H  18  05:45    


 


MCHC Differential  34.0 pg (28.0-36.0)   18  05:45    


 


RDW  14.9 % (11.5-20.0)   18  05:45    


 


Plt Count  235 Th/cmm (150-400)   18  05:45    


 


MPV  7.7 fl  18  05:45    


 


Neutrophils %  72.5 % (40.0-80.0)   18  05:45    


 


Lymphocytes %  14.5 % (20.0-50.0)  L  18  05:45    


 


Monocytes %  12.3 % (2.0-10.0)  H  18  05:45    


 


Eosinophils %  0.4 % (0.0-5.0)   18  05:45    


 


Basophils %  0.3 % (0.0-2.0)   18  05:45    


 


Sodium  139 mEq/L (136-145)   18  05:45    


 


Potassium  3.9 mEq/L (3.5-5.1)   18  05:45    


 


Chloride  104 mEq/L ()   18  05:45    


 


Carbon Dioxide  23.4 mEq/L (21.0-31.0)   18  05:45    


 


Anion Gap  15.5  (7.0-16.0)   18  05:45    


 


BUN  23 mg/dL (7-25)   18  05:45    


 


Creatinine  1.0 mg/dL (0.7-1.3)   18  05:45    


 


Est GFR ( Amer)  > 60.0 ml/min (>90)   18  05:45    


 


Est GFR (Non-Af Amer)  > 60.0 ml/min  18  05:45    


 


BUN/Creatinine Ratio  23.0   18  05:45    


 


Glucose  78 mg/dL ()   18  05:45    


 


Calcium  9.2 mg/dL (8.6-10.3)   18  05:45    


 


Valproic Acid  60.7 ug/mL (50.0-100.0)   18  20:52    














- Physical Exam


Vitals and I&O: 


 Vital Signs











Temp  97.9 F   18 20:04


 


Pulse  80   18 20:04


 


Resp  20   18 20:04


 


BP  130/70   18 20:04


 


Pulse Ox  96   18 20:04








 Intake & Output











 18





 06:59 18:59 06:59


 


Intake Total 240 1200 


 


Balance 240 1200 


 


Intake:   


 


  Oral 240 1200 


 


Other:   


 


  # Voids 2  


 


  # Bowel Movements 0 1 











Active Medications: 


Current Medications





Acetaminophen (Tylenol)  650 mg PO Q4HR PRN


   PRN Reason: Mild Pain / Temp above 100


   Stop: 18 01:46


Al Hydrox/Mg Hydrox/Simethicone (Maalox)  30 ml PO Q4HR PRN


   PRN Reason: GI DISTRESS


   Stop: 18 01:46


Albuterol/Ipratropium (Duoneb Neb)  3 ml HHN Q2HRT PRN


   PRN Reason: Wheezing


   Stop: 18 08:00


Amiodarone HCl (Cordarone)  200 mg PO DAILY [facility]


   Stop: 18 08:59


   Last Admin: 18 08:40 Dose:  200 mg


Cyanocobalamin (Vitamin B12)  1,000 mcg PO DAILY [facility]


   Stop: 18 08:59


   Last Admin: 18 08:39 Dose:  1,000 mcg


Diltiazem HCl (Cardizem)  60 mg PO DAILY [facility]


   Stop: 18 08:59


   Last Admin: 18 08:44 Dose:  60 mg


Divalproex Sodium (Depakote Dr)  500 mg PO BID [facility]; Protocol


   Stop: 18 08:59


   Last Admin: 18 16:51 Dose:  500 mg


Docusate Sodium (Colace)  100 mg PO BID PRN


   PRN Reason: CONSTIPATION


   Stop: 18 08:00


   Last Admin: 18 21:10 Dose:  100 mg


Ferrous Sulfate (Iron)  325 mg PO DAILY [facility]


   Stop: 18 08:59


   Last Admin: 18 08:39 Dose:  325 mg


Gabapentin (Neurontin)  300 mg PO TID [facility]


   Stop: 18 08:59


   Last Admin: 18 21:10 Dose:  300 mg


Magnesium Hydroxide (Milk Of Magnesia)  30 ml PO HS PRN


   PRN Reason: Constipation


Mirtazapine (Remeron)  15 mg PO HS [facility]; Protocol


   Stop: 18 20:59


   Last Admin: 18 21:10 Dose:  15 mg


Multivitamins/Vitamin C (Theragran)  1 tab PO DAILY [facility]


   Stop: 18 08:59


   Last Admin: 18 08:39 Dose:  1 tab


Ondansetron HCl (Zofran Odt)  4 mg PO Q6H PRN


   PRN Reason: Nausea / Vomiting


   Stop: 18 05:59


   Last Admin: 18 14:47 Dose:  4 mg


Oxybutynin Chloride (Ditropan)  5 mg PO HS [facility]


   Stop: 18 20:59


   Last Admin: 18 21:10 Dose:  5 mg


Pantoprazole Sodium (Protonix)  40 mg PO BID [facility]


   Stop: 18 06:59


   Last Admin: 18 16:51 Dose:  40 mg


Quetiapine Fumarate (Seroquel)  200 mg PO HS [facility]; Protocol


   Stop: 10/03/18 20:59


   Last Admin: 18 21:10 Dose:  200 mg


Quetiapine Fumarate (Seroquel)  200 mg PO BID [facility]; Protocol


   Stop: 10/03/18 16:59


   Last Admin: 18 16:51 Dose:  200 mg


Sodium Chloride (Nacl Tab)  1 gm PO BID [facility]


   Stop: 18 08:59


   Last Admin: 18 16:51 Dose:  1 gm


Tamsulosin HCl (Flomax)  0.4 mg PO DAILY [facility]


   Stop: 18 08:59


   Last Admin: 18 08:37 Dose:  0.4 mg








General: weak, lethargic, congested, demented


HEENT: NC/AT, PERRLA, EOMI, anicteric sclerae, throat clear, thinning hair


Neck: Supple, No JVD, No LAD


Lungs: wheezing, ronchi


Cardiovascular: RRR, Normal S1


Abdomen: soft, non-tender, non-distended


Extremities: clear


Neurological: no change





Internal Medicine Assmt/Plan





- Assessment


Assessment: 


ALOC: on and off; observe closely.





Insomnia: on and off. Ambien PRN.





Noncompliance: education provided.





PNA: improving? pt refuses IVPB ABX.





H/O A. Fib: rate controlled.





Acute Psychosis: follow recommendation by Psychiatrist.





Agitation: sitter PRN





Leukocytosis: resolving?





s/p recent UGIB.





Nutritional Asmnt/Malnutr-PDOC





- Dietary Evaluation


Malnutrition Findings (Please click <Entered> for more info): 








Nutritional Asmnt/Malnutrition                             Start:  18 13:

17


Text:                                                      Status: Complete    

  


Freq:                                                                          

  


Protocol:                                                                      

  


 Document     18 13:17  LCHENG  (Rec: 18 13:22  LCHENG  [email or web address])


 Nutritional Asmnt/Malnutrition


     Patient General Information


      Nutritional Screening                      Moderate Risk


      Diagnosis                                  psychosis


      Pertinent Medical Hx/Surgical Hx           CAD, s/p MI, afib, anemia, BPH


                                                 , COPD, PNA, psychosis,


                                                 anxiety, depression


      Subjective Information                     Pt seen sleeping in bed at


                                                 time of visit. Per EMR, PO


                                                 intake 100% of meals. Per


                                                 nurse note, pt had episode of


                                                 vomiting x 1 last night.


      Current Diet Order/ Nutrition Support      [facility] soft chopped bland


      Pertinent Medications                      vit B12, colace, iron, remeron


                                                 , theragran, protonix, nacl


                                                 tab, seroquel


      Pertinent Labs                              nutritin labs WNL


     Nutritional Hx/Data


      Height                                     1.75 m


      Height (Calculated Centimeters)            175.3


      Current Weight (lbs)                       61.235 kg


      Weight (Calculated Kilograms)              61.2


      Weight (Calculated Grams)                  71381.0


      Ideal Body Weight                          160


      Body Mass Index (BMI)                      19.9


      Weight Status                              Approriate


     GI Symptoms


      GI Symptoms                                None


      Last BM                                    


      Difficult in:                              None


      Skin Integrity/Comment:                    intact


      Current %PO                                Good (%)


     Estimated Nutritional Goals


      BEE in Kcals:                              Using Current wt


      Calories/Kcals/Kg                          25-30


      Kcals Calculated                           3397-1273


      Protein:                                   Using Current wt


      Protein g/k


      Protein Calculated                         61


      Fluid: ml                                  1525-1830ml (1ml/kcal)


     Nutritional Problem


      No current Nutrition Prob


       Problem                                   N/A


     Malnutrition Alert


      Is there a minimum of two criteria         No


       selected?                                 


       Query Text:Check all the applicable       


       criteria. A minimum of two criteria are   


       recommended for diagnosis of either       


       severe or non-severe malnutrition.        


     Malnutrition Related to Morbid Obesity


      Malnutrition related to morbid obesity     No


     Intervention/Recommendation


      Comments                                   1. Continue with [facility] soft


                                                 chopped bland diet as ordered.


                                                 2. Monitor PO intake, wt, labs


                                                 and skin integrity


                                                 3. F/U as low risk in 7 days,


                                                 


     Expected Outcomes/Goals


      Expected Outcomes/Goals                    1. PO intake to meet at least


                                                 75% of nutritional needs.


                                                 2. Wt stability, skin to


                                                 remain intact, labs WNL.

## 2018-08-09 RX ADMIN — PANTOPRAZOLE SODIUM SCH MG: 40 TABLET, DELAYED RELEASE ORAL at 08:57

## 2018-08-09 RX ADMIN — PANTOPRAZOLE SODIUM SCH MG: 40 TABLET, DELAYED RELEASE ORAL at 16:59

## 2018-08-09 RX ADMIN — DILTIAZEM HYDROCHLORIDE SCH MG: 30 TABLET, FILM COATED ORAL at 08:59

## 2018-08-09 NOTE — INTERNAL MEDICINE PROG NOTE
Internal Medicine Subjective





- Subjective


Service Date: 18


Patient seen and examined:: without staff


Patient is:: awake, non-interactive, in bed, agitated


Patient Complaints of:: unable to sleep


Per staff patient has:: no adverse event





Internal Medicine Objective





- Results


Result Diagrams: 


 18 05:45





 18 05:45


Recent Labs: 


 Laboratory Last Values











WBC  7.5 Th/cmm (4.8-10.8)   18  05:45    


 


RBC  3.86 Mil/cmm (3.80-5.80)   18  05:45    


 


Hgb  12.3 gm/dL (12-16)   18  05:45    


 


Hct  36.1 % (41.0-60)  L  18  05:45    


 


MCV  93.7 fl (80-99)   18  05:45    


 


MCH  31.8 pg (27.0-31.0)  H  18  05:45    


 


MCHC Differential  34.0 pg (28.0-36.0)   18  05:45    


 


RDW  14.9 % (11.5-20.0)   18  05:45    


 


Plt Count  235 Th/cmm (150-400)   18  05:45    


 


MPV  7.7 fl  18  05:45    


 


Neutrophils %  72.5 % (40.0-80.0)   18  05:45    


 


Lymphocytes %  14.5 % (20.0-50.0)  L  18  05:45    


 


Monocytes %  12.3 % (2.0-10.0)  H  18  05:45    


 


Eosinophils %  0.4 % (0.0-5.0)   18  05:45    


 


Basophils %  0.3 % (0.0-2.0)   18  05:45    


 


Sodium  139 mEq/L (136-145)   18  05:45    


 


Potassium  3.9 mEq/L (3.5-5.1)   18  05:45    


 


Chloride  104 mEq/L ()   18  05:45    


 


Carbon Dioxide  23.4 mEq/L (21.0-31.0)   18  05:45    


 


Anion Gap  15.5  (7.0-16.0)   18  05:45    


 


BUN  23 mg/dL (7-25)   18  05:45    


 


Creatinine  1.0 mg/dL (0.7-1.3)   18  05:45    


 


Est GFR ( Amer)  > 60.0 ml/min (>90)   18  05:45    


 


Est GFR (Non-Af Amer)  > 60.0 ml/min  18  05:45    


 


BUN/Creatinine Ratio  23.0   18  05:45    


 


Glucose  78 mg/dL ()   18  05:45    


 


Calcium  9.2 mg/dL (8.6-10.3)   18  05:45    


 


Valproic Acid  60.7 ug/mL (50.0-100.0)   18  20:52    














- Physical Exam


Vitals and I&O: 


 Vital Signs











Temp  97.6 F   18 14:00


 


Pulse  86   18 14:00


 


Resp  20   18 14:00


 


BP  96/56   18 14:00


 


Pulse Ox  96   18 14:00








 Intake & Output











 08/09/18 08/09/18 08/10/18





 06:59 18:59 06:59


 


Intake Total 240 960 


 


Balance 240 960 


 


Intake:   


 


  Oral 240 960 


 


Other:   


 


  # Voids 3 3 


 


  # Bowel Movements 0 1 











Active Medications: 


Current Medications





Acetaminophen (Tylenol)  650 mg PO Q4HR PRN


   PRN Reason: Mild Pain / Temp above 100


   Stop: 18 01:46


   Last Admin: 18 16:59 Dose:  650 mg


Al Hydrox/Mg Hydrox/Simethicone (Maalox)  30 ml PO Q4HR PRN


   PRN Reason: GI DISTRESS


   Stop: 18 01:46


Albuterol/Ipratropium (Duoneb Neb)  3 ml HHN Q2HRT PRN


   PRN Reason: Wheezing


   Stop: 18 08:00


Amiodarone HCl (Cordarone)  200 mg PO DAILY [facility]


   Stop: 18 08:59


   Last Admin: 18 08:59 Dose:  200 mg


Cyanocobalamin (Vitamin B12)  1,000 mcg PO DAILY [facility]


   Stop: 18 08:59


   Last Admin: 18 08:58 Dose:  1,000 mcg


Diltiazem HCl (Cardizem)  60 mg PO DAILY [facility]


   Stop: 18 08:59


   Last Admin: 18 08:59 Dose:  60 mg


Divalproex Sodium (Depakote Dr)  500 mg PO BID [facility]; Protocol


   Stop: 18 08:59


   Last Admin: 18 17:00 Dose:  500 mg


Docusate Sodium (Colace)  100 mg PO BID PRN


   PRN Reason: CONSTIPATION


   Stop: 18 08:00


   Last Admin: 18 21:10 Dose:  100 mg


Ferrous Sulfate (Iron)  325 mg PO DAILY [facility]


   Stop: 18 08:59


   Last Admin: 18 08:58 Dose:  325 mg


Gabapentin (Neurontin)  300 mg PO TID [facility]


   Stop: 18 08:59


   Last Admin: 18 21:28 Dose:  300 mg


Magnesium Hydroxide (Milk Of Magnesia)  30 ml PO HS PRN


   PRN Reason: Constipation


Mirtazapine (Remeron)  15 mg PO HS [facility]; Protocol


   Stop: 18 20:59


   Last Admin: 18 21:28 Dose:  15 mg


Multivitamins/Vitamin C (Theragran)  1 tab PO DAILY [facility]


   Stop: 18 08:59


   Last Admin: 18 08:58 Dose:  1 tab


Ondansetron HCl (Zofran Odt)  4 mg PO Q6H PRN


   PRN Reason: Nausea / Vomiting


   Stop: 18 05:59


   Last Admin: 18 14:44 Dose:  4 mg


Oxybutynin Chloride (Ditropan)  5 mg PO HS [facility]


   Stop: 18 20:59


   Last Admin: 18 21:28 Dose:  5 mg


Pantoprazole Sodium (Protonix)  40 mg PO BID [facility]


   Stop: 18 06:59


   Last Admin: 18 16:59 Dose:  40 mg


Quetiapine Fumarate (Seroquel)  200 mg PO HS [facility]; Protocol


   Stop: 10/03/18 20:59


   Last Admin: 18 21:28 Dose:  200 mg


Quetiapine Fumarate (Seroquel)  200 mg PO BID [facility]; Protocol


   Stop: 10/03/18 16:59


   Last Admin: 18 16:58 Dose:  200 mg


Sodium Chloride (Nacl Tab)  1 gm PO BID [facility]


   Stop: 18 08:59


   Last Admin: 18 16:59 Dose:  1 gm


Tamsulosin HCl (Flomax)  0.4 mg PO DAILY [facility]


   Stop: 18 08:59


   Last Admin: 18 08:58 Dose:  0.4 mg








General: weak, lethargic, congested, demented


HEENT: NC/AT, PERRLA, EOMI, anicteric sclerae, throat clear, thinning hair


Neck: Supple, No JVD, No LAD


Lungs: wheezing, ronchi


Cardiovascular: RRR, Normal S1


Abdomen: soft, non-tender, non-distended


Extremities: clear


Neurological: no change





Internal Medicine Assmt/Plan





- Assessment


Assessment: 


Self induced vomitting: close monitoring.





ALOC: on and off; observe closely.





Insomnia: on and off. Ambien PRN.





Noncompliance: education provided.





PNA: improving? pt refuses IVPB ABX.





H/O A. Fib: rate controlled.





Acute Psychosis: follow recommendation by Psychiatrist.





Agitation: sitter PRN





Leukocytosis: resolving?





s/p recent UGIB.





Nutritional Asmnt/Malnutr-PDOC





- Dietary Evaluation


Malnutrition Findings (Please click <Entered> for more info): 








Nutritional Asmnt/Malnutrition                             Start:  18 13:

17


Text:                                                      Status: Complete    

  


Freq:                                                                          

  


Protocol:                                                                      

  


 Document     18 13:17  HEN  (Rec: 18 13:22  [facility]  [email or web address])


 Nutritional Asmnt/Malnutrition


     Patient General Information


      Nutritional Screening                      Moderate Risk


      Diagnosis                                  psychosis


      Pertinent Medical Hx/Surgical Hx           CAD, s/p MI, afib, anemia, BPH


                                                 , COPD, PNA, psychosis,


                                                 anxiety, depression


      Subjective Information                     Pt seen sleeping in bed at


                                                 time of visit. Per EMR, PO


                                                 intake 100% of meals. Per


                                                 nurse note, pt had episode of


                                                 vomiting x 1 last night.


      Current Diet Order/ Nutrition Support      [facility] soft chopped bland


      Pertinent Medications                      vit B12, colace, iron, remeron


                                                 , theragran, protonix, nacl


                                                 tab, seroquel


      Pertinent Labs                              nutritin labs WNL


     Nutritional Hx/Data


      Height                                     1.75 m


      Height (Calculated Centimeters)            175.3


      Current Weight (lbs)                       61.235 kg


      Weight (Calculated Kilograms)              61.2


      Weight (Calculated Grams)                  17888.0


      Ideal Body Weight                          160


      Body Mass Index (BMI)                      19.9


      Weight Status                              Approriate


     GI Symptoms


      GI Symptoms                                None


      Last BM                                    


      Difficult in:                              None


      Skin Integrity/Comment:                    intact


      Current %PO                                Good (%)


     Estimated Nutritional Goals


      BEE in Kcals:                              Using Current wt


      Calories/Kcals/Kg                          25-30


      Kcals Calculated                           6123-5202


      Protein:                                   Using Current wt


      Protein g/k


      Protein Calculated                         61


      Fluid: ml                                  1525-1830ml (1ml/kcal)


     Nutritional Problem


      No current Nutrition Prob


       Problem                                   N/A


     Malnutrition Alert


      Is there a minimum of two criteria         No


       selected?                                 


       Query Text:Check all the applicable       


       criteria. A minimum of two criteria are   


       recommended for diagnosis of either       


       severe or non-severe malnutrition.        


     Malnutrition Related to Morbid Obesity


      Malnutrition related to morbid obesity     No


     Intervention/Recommendation


      Comments                                   1. Continue with [facility] soft


                                                 chopped bland diet as ordered.


                                                 2. Monitor PO intake, wt, labs


                                                 and skin integrity


                                                 3. F/U as low risk in 7 days,


                                                 


     Expected Outcomes/Goals


      Expected Outcomes/Goals                    1. PO intake to meet at least


                                                 75% of nutritional needs.


                                                 2. Wt stability, skin to


                                                 remain intact, labs WNL.

## 2018-08-10 RX ADMIN — DILTIAZEM HYDROCHLORIDE SCH MG: 30 TABLET, FILM COATED ORAL at 09:12

## 2018-08-10 RX ADMIN — PANTOPRAZOLE SODIUM SCH: 40 TABLET, DELAYED RELEASE ORAL at 16:36

## 2018-08-10 RX ADMIN — ALUMINUM HYDROXIDE, MAGNESIUM HYDROXIDE, AND SIMETHICONE PRN ML: 200; 200; 20 SUSPENSION ORAL at 23:17

## 2018-08-10 RX ADMIN — PANTOPRAZOLE SODIUM SCH MG: 40 TABLET, DELAYED RELEASE ORAL at 09:08

## 2018-08-10 NOTE — INTERNAL MEDICINE PROG NOTE
Internal Medicine Subjective





- Subjective


Service Date: 08/10/18


Patient seen and examined:: without staff


Patient is:: awake, non-interactive, in bed, agitated


Patient Complaints of:: unable to sleep


Per staff patient has:: no adverse event





Internal Medicine Objective





- Results


Result Diagrams: 


 18 05:45





 18 05:45


Recent Labs: 


 Laboratory Last Values











WBC  7.5 Th/cmm (4.8-10.8)   18  05:45    


 


RBC  3.86 Mil/cmm (3.80-5.80)   18  05:45    


 


Hgb  12.3 gm/dL (12-16)   18  05:45    


 


Hct  36.1 % (41.0-60)  L  18  05:45    


 


MCV  93.7 fl (80-99)   18  05:45    


 


MCH  31.8 pg (27.0-31.0)  H  18  05:45    


 


MCHC Differential  34.0 pg (28.0-36.0)   18  05:45    


 


RDW  14.9 % (11.5-20.0)   18  05:45    


 


Plt Count  235 Th/cmm (150-400)   18  05:45    


 


MPV  7.7 fl  18  05:45    


 


Neutrophils %  72.5 % (40.0-80.0)   18  05:45    


 


Lymphocytes %  14.5 % (20.0-50.0)  L  18  05:45    


 


Monocytes %  12.3 % (2.0-10.0)  H  18  05:45    


 


Eosinophils %  0.4 % (0.0-5.0)   18  05:45    


 


Basophils %  0.3 % (0.0-2.0)   18  05:45    


 


Sodium  139 mEq/L (136-145)   18  05:45    


 


Potassium  3.9 mEq/L (3.5-5.1)   18  05:45    


 


Chloride  104 mEq/L ()   18  05:45    


 


Carbon Dioxide  23.4 mEq/L (21.0-31.0)   18  05:45    


 


Anion Gap  15.5  (7.0-16.0)   18  05:45    


 


BUN  23 mg/dL (7-25)   18  05:45    


 


Creatinine  1.0 mg/dL (0.7-1.3)   18  05:45    


 


Est GFR ( Amer)  > 60.0 ml/min (>90)   18  05:45    


 


Est GFR (Non-Af Amer)  > 60.0 ml/min  18  05:45    


 


BUN/Creatinine Ratio  23.0   18  05:45    


 


Glucose  78 mg/dL ()   18  05:45    


 


Calcium  9.2 mg/dL (8.6-10.3)   18  05:45    


 


Valproic Acid  60.7 ug/mL (50.0-100.0)   18  20:52    














- Physical Exam


Vitals and I&O: 


 Vital Signs











Temp  98 F   08/10/18 20:45


 


Pulse  90   08/10/18 20:45


 


Resp  19   08/10/18 20:45


 


BP  122/68   08/10/18 20:45


 


Pulse Ox  97   08/10/18 20:45








 Intake & Output











 08/10/18 08/10/18 08/11/18





 06:59 18:59 06:59


 


Intake Total  1200 240


 


Balance  1200 240


 


Intake:   


 


  Oral  1200 240


 


Other:   


 


  # Voids   1


 


  # Bowel Movements  1 











Active Medications: 


Current Medications





Acetaminophen (Tylenol)  650 mg PO Q4HR PRN


   PRN Reason: Mild Pain / Temp above 100


   Stop: 18 01:46


   Last Admin: 18 16:59 Dose:  650 mg


Al Hydrox/Mg Hydrox/Simethicone (Maalox)  30 ml PO Q4HR PRN


   PRN Reason: GI DISTRESS


   Stop: 18 01:46


Albuterol/Ipratropium (Duoneb Neb)  3 ml HHN Q2HRT PRN


   PRN Reason: Wheezing


   Stop: 18 08:00


Amiodarone HCl (Cordarone)  200 mg PO DAILY [facility]


   Stop: 18 08:59


   Last Admin: 08/10/18 09:09 Dose:  200 mg


Cyanocobalamin (Vitamin B12)  1,000 mcg PO DAILY [facility]


   Stop: 18 08:59


   Last Admin: 08/10/18 09:09 Dose:  1,000 mcg


Diltiazem HCl (Cardizem)  60 mg PO DAILY [facility]


   Stop: 18 08:59


   Last Admin: 08/10/18 09:12 Dose:  60 mg


Divalproex Sodium (Depakote Dr)  500 mg PO BID [facility]; Protocol


   Stop: 18 08:59


   Last Admin: 08/10/18 16:36 Dose:  Not Given


Docusate Sodium (Colace)  100 mg PO BID PRN


   PRN Reason: CONSTIPATION


   Stop: 18 08:00


   Last Admin: 18 21:10 Dose:  100 mg


Ferrous Sulfate (Iron)  325 mg PO DAILY [facility]


   Stop: 18 08:59


   Last Admin: 08/10/18 09:09 Dose:  325 mg


Gabapentin (Neurontin)  300 mg PO TID [facility]


   Stop: 18 08:59


   Last Admin: 08/10/18 14:43 Dose:  300 mg


Magnesium Hydroxide (Milk Of Magnesia)  30 ml PO HS PRN


   PRN Reason: Constipation


Mirtazapine (Remeron)  15 mg PO HS [facility]; Protocol


   Stop: 18 20:59


   Last Admin: 18 21:28 Dose:  15 mg


Multivitamins/Vitamin C (Theragran)  1 tab PO DAILY [facility]


   Stop: 18 08:59


   Last Admin: 08/10/18 09:09 Dose:  1 tab


Ondansetron HCl (Zofran Odt)  4 mg PO Q6H PRN


   PRN Reason: Nausea / Vomiting


   Stop: 18 05:59


   Last Admin: 18 14:44 Dose:  4 mg


Oxybutynin Chloride (Ditropan)  5 mg PO HS [facility]


   Stop: 18 20:59


   Last Admin: 18 21:28 Dose:  5 mg


Pantoprazole Sodium (Protonix)  40 mg PO BID [facility]


   Stop: 18 06:59


   Last Admin: 08/10/18 16:36 Dose:  Not Given


Quetiapine Fumarate (Seroquel)  200 mg PO HS [facility]; Protocol


   Stop: 10/03/18 20:59


   Last Admin: 18 21:28 Dose:  200 mg


Quetiapine Fumarate (Seroquel)  200 mg PO BID [facility]; Protocol


   Stop: 10/03/18 16:59


   Last Admin: 08/10/18 16:36 Dose:  Not Given


Sodium Chloride (Nacl Tab)  1 gm PO BID [facility]


   Stop: 18 08:59


   Last Admin: 08/10/18 16:36 Dose:  Not Given


Tamsulosin HCl (Flomax)  0.4 mg PO DAILY [facility]


   Stop: 18 08:59


   Last Admin: 08/10/18 09:09 Dose:  0.4 mg








General: weak, lethargic, congested, demented


HEENT: NC/AT, PERRLA, EOMI, anicteric sclerae, throat clear, thinning hair


Neck: Supple, No JVD, No LAD


Lungs: wheezing, ronchi


Cardiovascular: RRR, Normal S1


Abdomen: soft, non-tender, non-distended


Extremities: clear


Neurological: no change





Internal Medicine Assmt/Plan





- Assessment


Assessment: 


Noncompliance: education provided.





ALOC: on and off; observe closely.





Insomnia: on and off. Ambien PRN.





PNA: improving? pt refuses IVPB ABX.





H/O A. Fib: rate controlled.





Acute Psychosis: follow recommendation by Psychiatrist.





Agitation: sitter PRN





Leukocytosis: resolving?





s/p recent UGIB.





Nutritional Asmnt/Malnutr-PDOC





- Dietary Evaluation


Malnutrition Findings (Please click <Entered> for more info): 








Nutritional Asmnt/Malnutrition                             Start:  18 13:

17


Text:                                                      Status: Complete    

  


Freq:                                                                          

  


Protocol:                                                                      

  


 Document     18 13:17  [facility]  (Rec: 18 13:22  [facility]  RACHELLE-[email or web address])


 Nutritional Asmnt/Malnutrition


     Patient General Information


      Nutritional Screening                      Moderate Risk


      Diagnosis                                  psychosis


      Pertinent Medical Hx/Surgical Hx           CAD, s/p MI, afib, anemia, BPH


                                                 , COPD, PNA, psychosis,


                                                 anxiety, depression


      Subjective Information                     Pt seen sleeping in bed at


                                                 time of visit. Per EMR, PO


                                                 intake 100% of meals. Per


                                                 nurse note, pt had episode of


                                                 vomiting x 1 last night.


      Current Diet Order/ Nutrition Support      [facility] soft chopped bland


      Pertinent Medications                      vit B12, colace, iron, remeron


                                                 , theragran, protonix, nacl


                                                 tab, seroquel


      Pertinent Labs                              nutritin labs WNL


     Nutritional Hx/Data


      Height                                     1.75 m


      Height (Calculated Centimeters)            175.3


      Current Weight (lbs)                       61.235 kg


      Weight (Calculated Kilograms)              61.2


      Weight (Calculated Grams)                  37380.0


      Ideal Body Weight                          160


      Body Mass Index (BMI)                      19.9


      Weight Status                              Approriate


     GI Symptoms


      GI Symptoms                                None


      Last BM                                    


      Difficult in:                              None


      Skin Integrity/Comment:                    intact


      Current %PO                                Good (%)


     Estimated Nutritional Goals


      BEE in Kcals:                              Using Current wt


      Calories/Kcals/Kg                          25-30


      Kcals Calculated                           3831-5948


      Protein:                                   Using Current wt


      Protein g/k


      Protein Calculated                         61


      Fluid: ml                                  1525-1830ml (1ml/kcal)


     Nutritional Problem


      No current Nutrition Prob


       Problem                                   N/A


     Malnutrition Alert


      Is there a minimum of two criteria         No


       selected?                                 


       Query Text:Check all the applicable       


       criteria. A minimum of two criteria are   


       recommended for diagnosis of either       


       severe or non-severe malnutrition.        


     Malnutrition Related to Morbid Obesity


      Malnutrition related to morbid obesity     No


     Intervention/Recommendation


      Comments                                   1. Continue with [facility] soft


                                                 chopped bland diet as ordered.


                                                 2. Monitor PO intake, wt, labs


                                                 and skin integrity


                                                 3. F/U as low risk in 7 days,


                                                 


     Expected Outcomes/Goals


      Expected Outcomes/Goals                    1. PO intake to meet at least


                                                 75% of nutritional needs.


                                                 2. Wt stability, skin to


                                                 remain intact, labs WNL.

## 2018-08-10 NOTE — PROGRESS NOTES
DATE:  08/05/2018



SUBJECTIVE:  Chart reviewed and the patient interviewed.  Also, discussed the

patient's condition with the staff and reviewed records and labs.  The patient

is still intrusive and impulsive.  The patient also is still slamming the doors

and he is still easily agitated and in irritable mood.  The patient also still

has difficulty following staff directions.  He also is still in angry moods for

no apparent reason.  The patient also is still demanding.  Otherwise, the

patient is compliant with taking his medications and no side effects of

medications.



ASSESSMENT:  The patient is still agitated and still needs close observation

because of his aggressive behavior.



TREATMENT PLAN:  Continue to monitor his behavior and his condition closely. 

Also, continue Seroquel and Depakote and monitor Depakote blood level.  Also,

continue to work on his poor impulse control.





DD: 08/10/2018 06:58

DT: 08/10/2018 22:44

JOB# [id number]  [id number]

## 2018-08-10 NOTE — PROGRESS NOTES
DATE:  08/10/2018



Case was discussed with staff of the patient, reviewed records.  This is a

well-known case to me.  I have admitted him covering for Dr. Conner.  The patient

continues to be irritable, continues to be demanding, anxious, continues to be

slamming doors at the time and unable to follow direction.  So these symptoms

are not as prominent as it was in the past few days, tried to slow down a little

bit.  He is compliant with the medication, no side effects, no sedation, no

nausea and no extrapyramidal symptoms.  On Depakote 500 mg twice a day and

Remeron 15 mg at bedtime and Seroquel 200 mg at bedtime and 200 mg twice a day. 

No side effects, no sedation noted.  No extrapyramidal symptoms.  We will accept

the patient in group therapy, milieu therapy, and adjust medications as needed.





DD: 08/10/2018 12:50

DT: 08/10/2018 18:16

JOB# [id number]  [id number]

## 2018-08-11 RX ADMIN — DILTIAZEM HYDROCHLORIDE SCH: 30 TABLET, FILM COATED ORAL at 09:00

## 2018-08-11 RX ADMIN — PANTOPRAZOLE SODIUM SCH: 40 TABLET, DELAYED RELEASE ORAL at 17:57

## 2018-08-11 RX ADMIN — PANTOPRAZOLE SODIUM SCH: 40 TABLET, DELAYED RELEASE ORAL at 09:00

## 2018-08-11 NOTE — PROGRESS NOTES
DATE:  08/07/2018



DATE OF SERVICE:  08/07/2018



SUBJECTIVE:  Chart reviewed and the patient interviewed.  Also discussed the

patient's condition with the staff and reviewed records and labs.  The patient

still has moments of anger and is still easily agitated.  The patient also

continued to have episodes of slamming the doors at times and he is also easily

agitated and easily irritable.  The patient also is still focused on smoking and

still has poor impulse control.  Otherwise, the patient is trying to follow

directions somewhat, but is still confused and irritable.



ASSESSMENT:  The patient is still agitated and irritable.



TREATMENT PLAN:  We will continue to monitor his behavior and his condition and

continue to work on his poor impulse control.  Also, continue to work on his

mood swings and adjusting psychotropic medications and continue to follow up. 

Also, monitoring _____.





DD: 08/11/2018 04:51

DT: 08/11/2018 07:53

JOB# [id number]  [id number]

## 2018-08-11 NOTE — PROGRESS NOTES
DATE:  08/11/2018



SUBJECTIVE:  The patient states he is not doing "too good", again "not doing

too good", disoriented, does not know why he is here, does not know the year,

does not know the month, noted to be confused, periods of agitation here due to

agitation.  Per documentation, history of schizoaffective noted by staff to be

at times confused, suspicious, agitated, refusing medications at times refusing

care at times, making statements that he does not want any medications, but

ongoing behaviors, ongoing agitation, isolation, trying to go to bed at times.



ASSESSMENT:  The patient remains unruly, still symptomatic.  Difficult to

control behaviors, agitated behaviors, confused.



PLAN:  We will continue to monitor, encourage med compliance.





DD: 08/11/2018 06:29

DT: 08/11/2018 21:42

JOB# [id number]  [id number]

## 2018-08-11 NOTE — PROGRESS NOTES
DATE:  08/09/2018



DATE:  08/09/2018



SUBJECTIVE:  Chart reviewed and the patient interviewed.  Also discussed the

patient's condition with the staff and reviewed the records and labs.  The

patient is still having episodes of irritability and anger, withstanding goals

but seems to be slightly less than before.  The patient also is still suspicious

and is still annoyed.  The patient also still needs redirections.  Otherwise,

the patient is cooperative and compliant with taking his medications.



ASSESSMENT:  The patient is still agitated.



TREATMENT PLAN:  Continue monitoring his behavior and his condition and continue

to work on his irritability and followup.





DD: 08/11/2018 05:56

DT: 08/11/2018 21:09

JOB# [id number]  [id number]

## 2018-08-11 NOTE — PROGRESS NOTES
DATE:  08/06/2018



DATE OF SERVICE:  08/06/2018



SUBJECTIVE:  Chart reviewed and the patient interviewed.  Also discussed the

patient's condition with the staff and reviewed records and labs.  The patient

is complaining of auditory hallucinations and he is still hearing voices,

"_____".  He also is still extremely angry and agitated and slamming doors and

demanding.  Also, is still having difficulty following directions.  The patient

also is still acting bizarre sometimes and is trying to throw up.  He also gets

angry outburst.  Otherwise, the patient continued to comply with taking

Depakote, Seroquel, and Remeron with no side effect.



ASSESSMENT:  The patient is still agitated and is still psychotic.



TREATMENT PLAN:  Continue to monitor his behavior and his condition closely. 

Also, continue adjusting psychotropic medications and also working on behavioral

modification.





DD: 08/11/2018 04:35

DT: 08/11/2018 18:57

JOB# [id number]  [id number]

## 2018-08-11 NOTE — INTERNAL MEDICINE PROG NOTE
Internal Medicine Subjective





- Subjective


Service Date: 18


Patient seen and examined:: without staff


Patient is:: awake, non-interactive, in bed, agitated


Patient Complaints of:: unable to sleep


Per staff patient has:: no adverse event





Internal Medicine Objective





- Results


Result Diagrams: 


 18 05:45





 18 05:45


Recent Labs: 


 Laboratory Last Values











WBC  7.5 Th/cmm (4.8-10.8)   18  05:45    


 


RBC  3.86 Mil/cmm (3.80-5.80)   18  05:45    


 


Hgb  12.3 gm/dL (12-16)   18  05:45    


 


Hct  36.1 % (41.0-60)  L  18  05:45    


 


MCV  93.7 fl (80-99)   18  05:45    


 


MCH  31.8 pg (27.0-31.0)  H  18  05:45    


 


MCHC Differential  34.0 pg (28.0-36.0)   18  05:45    


 


RDW  14.9 % (11.5-20.0)   18  05:45    


 


Plt Count  235 Th/cmm (150-400)   18  05:45    


 


MPV  7.7 fl  18  05:45    


 


Neutrophils %  72.5 % (40.0-80.0)   18  05:45    


 


Lymphocytes %  14.5 % (20.0-50.0)  L  18  05:45    


 


Monocytes %  12.3 % (2.0-10.0)  H  18  05:45    


 


Eosinophils %  0.4 % (0.0-5.0)   18  05:45    


 


Basophils %  0.3 % (0.0-2.0)   18  05:45    


 


Sodium  139 mEq/L (136-145)   18  05:45    


 


Potassium  3.9 mEq/L (3.5-5.1)   18  05:45    


 


Chloride  104 mEq/L ()   18  05:45    


 


Carbon Dioxide  23.4 mEq/L (21.0-31.0)   18  05:45    


 


Anion Gap  15.5  (7.0-16.0)   18  05:45    


 


BUN  23 mg/dL (7-25)   18  05:45    


 


Creatinine  1.0 mg/dL (0.7-1.3)   18  05:45    


 


Est GFR ( Amer)  > 60.0 ml/min (>90)   18  05:45    


 


Est GFR (Non-Af Amer)  > 60.0 ml/min  18  05:45    


 


BUN/Creatinine Ratio  23.0   18  05:45    


 


Glucose  78 mg/dL ()   18  05:45    


 


Calcium  9.2 mg/dL (8.6-10.3)   18  05:45    


 


Valproic Acid  60.7 ug/mL (50.0-100.0)   18  20:52    














- Physical Exam


Vitals and I&O: 


 Vital Signs











Temp  98 F   18 20:00


 


Pulse  103   18 20:00


 


Resp  20   18 20:00


 


BP  140/90   18 20:00


 


Pulse Ox  95   18 20:00








 Intake & Output











 18





 06:59 18:59 06:59


 


Intake Total 240 800 


 


Balance 240 800 


 


Intake:   


 


  Oral 240 800 


 


Other:   


 


  # Voids 1 3 


 


  # Bowel Movements  0 











Active Medications: 


Current Medications





Acetaminophen (Tylenol)  650 mg PO Q4HR PRN


   PRN Reason: Mild Pain / Temp above 100


   Stop: 18 01:46


   Last Admin: 18 16:59 Dose:  650 mg


Al Hydrox/Mg Hydrox/Simethicone (Maalox)  30 ml PO Q4HR PRN


   PRN Reason: GI DISTRESS


   Stop: 18 01:46


   Last Admin: 08/10/18 23:17 Dose:  30 ml


Albuterol/Ipratropium (Duoneb Neb)  3 ml HHN Q2HRT PRN


   PRN Reason: Wheezing


   Stop: 18 08:00


Amiodarone HCl (Cordarone)  200 mg PO DAILY [facility]


   Stop: 18 08:59


   Last Admin: 18 09:00 Dose:  Not Given


Cyanocobalamin (Vitamin B12)  1,000 mcg PO DAILY [facility]


   Stop: 18 08:59


   Last Admin: 18 09:00 Dose:  Not Given


Diltiazem HCl (Cardizem)  60 mg PO DAILY [facility]


   Stop: 18 08:59


   Last Admin: 18 09:00 Dose:  Not Given


Divalproex Sodium (Depakote Dr)  500 mg PO BID [facility]; Protocol


   Stop: 18 08:59


   Last Admin: 18 17:56 Dose:  Not Given


Docusate Sodium (Colace)  100 mg PO BID PRN


   PRN Reason: CONSTIPATION


   Stop: 18 08:00


   Last Admin: 08/10/18 23:17 Dose:  100 mg


Ferrous Sulfate (Iron)  325 mg PO DAILY [facility]


   Stop: 18 08:59


   Last Admin: 18 09:00 Dose:  Not Given


Gabapentin (Neurontin)  300 mg PO TID [facility]


   Stop: 18 08:59


   Last Admin: 18 13:51 Dose:  Not Given


Magnesium Hydroxide (Milk Of Magnesia)  30 ml PO HS PRN


   PRN Reason: Constipation


Mirtazapine (Remeron)  15 mg PO HS [facility]; Protocol


   Stop: 18 20:59


   Last Admin: 08/10/18 23:17 Dose:  15 mg


Multivitamins/Vitamin C (Theragran)  1 tab PO DAILY [facility]


   Stop: 18 08:59


   Last Admin: 18 09:00 Dose:  Not Given


Ondansetron HCl (Zofran Odt)  4 mg PO Q6H PRN


   PRN Reason: Nausea / Vomiting


   Stop: 18 05:59


   Last Admin: 08/10/18 23:18 Dose:  4 mg


Oxybutynin Chloride (Ditropan)  5 mg PO HS [facility]


   Stop: 18 20:59


   Last Admin: 08/10/18 23:17 Dose:  5 mg


Pantoprazole Sodium (Protonix)  40 mg PO BID [facility]


   Stop: 18 06:59


   Last Admin: 18 17:57 Dose:  Not Given


Quetiapine Fumarate (Seroquel)  200 mg PO HS [facility]; Protocol


   Stop: 10/03/18 20:59


   Last Admin: 08/10/18 23:18 Dose:  200 mg


Quetiapine Fumarate (Seroquel)  200 mg PO BID [facility]; Protocol


   Stop: 10/03/18 16:59


   Last Admin: 18 17:57 Dose:  Not Given


Sodium Chloride (Nacl Tab)  1 gm PO BID [facility]


   Stop: 18 08:59


   Last Admin: 18 17:57 Dose:  Not Given


Tamsulosin HCl (Flomax)  0.4 mg PO DAILY [facility]


   Stop: 18 08:59


   Last Admin: 18 09:00 Dose:  Not Given








General: weak, lethargic, congested, demented


HEENT: NC/AT, PERRLA, EOMI, anicteric sclerae, throat clear, thinning hair


Neck: Supple, No JVD, No LAD


Lungs: wheezing, ronchi


Cardiovascular: RRR, Normal S1


Abdomen: soft, non-tender, non-distended


Extremities: clear


Neurological: no change





Internal Medicine Assmt/Plan





- Assessment


Assessment: 


Self induced vomiting: education provided, close monitoring.





Noncompliance: education provided.





ALOC: on and off; observe closely.





Insomnia: on and off. Ambien PRN.





PNA: improving? pt refuses IVPB ABX.





H/O A. Fib: rate controlled.





Acute Psychosis: follow recommendation by Psychiatrist.





Agitation: sitter PRN





Leukocytosis: resolving?





s/p recent UGIB.





Nutritional Asmnt/Malnutr-PDOC





- Dietary Evaluation


Malnutrition Findings (Please click <Entered> for more info): 








Nutritional Asmnt/Malnutrition                             Start:  18 13:

17


Text:                                                      Status: Complete    

  


Freq:                                                                          

  


Protocol:                                                                      

  


 Document     18 13:17  LCHENG  (Rec: 18 13:22  [facility]  [email or web address])


 Nutritional Asmnt/Malnutrition


     Patient General Information


      Nutritional Screening                      Moderate Risk


      Diagnosis                                  psychosis


      Pertinent Medical Hx/Surgical Hx           CAD, s/p MI, afib, anemia, BPH


                                                 , COPD, PNA, psychosis,


                                                 anxiety, depression


      Subjective Information                     Pt seen sleeping in bed at


                                                 time of visit. Per EMR, PO


                                                 intake 100% of meals. Per


                                                 nurse note, pt had episode of


                                                 vomiting x 1 last night.


      Current Diet Order/ Nutrition Support      [facility] soft chopped bland


      Pertinent Medications                      vit B12, colace, iron, remeron


                                                 , theragran, protonix, nacl


                                                 tab, seroquel


      Pertinent Labs                              nutritin labs WNL


     Nutritional Hx/Data


      Height                                     1.75 m


      Height (Calculated Centimeters)            175.3


      Current Weight (lbs)                       61.235 kg


      Weight (Calculated Kilograms)              61.2


      Weight (Calculated Grams)                  52150.0


      Ideal Body Weight                          160


      Body Mass Index (BMI)                      19.9


      Weight Status                              Approriate


     GI Symptoms


      GI Symptoms                                None


      Last BM                                    


      Difficult in:                              None


      Skin Integrity/Comment:                    intact


      Current %PO                                Good (%)


     Estimated Nutritional Goals


      BEE in Kcals:                              Using Current wt


      Calories/Kcals/Kg                          25-30


      Kcals Calculated                           3395-8553


      Protein:                                   Using Current wt


      Protein g/k


      Protein Calculated                         61


      Fluid: ml                                  1525-1830ml (1ml/kcal)


     Nutritional Problem


      No current Nutrition Prob


       Problem                                   N/A


     Malnutrition Alert


      Is there a minimum of two criteria         No


       selected?                                 


       Query Text:Check all the applicable       


       criteria. A minimum of two criteria are   


       recommended for diagnosis of either       


       severe or non-severe malnutrition.        


     Malnutrition Related to Morbid Obesity


      Malnutrition related to morbid obesity     No


     Intervention/Recommendation


      Comments                                   1. Continue with [facility] soft


                                                 chopped bland diet as ordered.


                                                 2. Monitor PO intake, wt, labs


                                                 and skin integrity


                                                 3. F/U as low risk in 7 days,


                                                 


     Expected Outcomes/Goals


      Expected Outcomes/Goals                    1. PO intake to meet at least


                                                 75% of nutritional needs.


                                                 2. Wt stability, skin to


                                                 remain intact, labs WNL.

## 2018-08-12 RX ADMIN — PANTOPRAZOLE SODIUM SCH: 40 TABLET, DELAYED RELEASE ORAL at 17:40

## 2018-08-12 RX ADMIN — PANTOPRAZOLE SODIUM SCH MG: 40 TABLET, DELAYED RELEASE ORAL at 08:45

## 2018-08-12 RX ADMIN — DILTIAZEM HYDROCHLORIDE SCH MG: 30 TABLET, FILM COATED ORAL at 08:45

## 2018-08-12 NOTE — INTERNAL MEDICINE PROG NOTE
Internal Medicine Subjective





- Subjective


Service Date: 18


Patient seen and examined:: without staff


Patient is:: awake, non-interactive, in bed, agitated


Patient Complaints of:: unable to sleep


Per staff patient has:: no adverse event





Internal Medicine Objective





- Results


Result Diagrams: 


 18 05:45





 18 05:45


Recent Labs: 


 Laboratory Last Values











WBC  7.5 Th/cmm (4.8-10.8)   18  05:45    


 


RBC  3.86 Mil/cmm (3.80-5.80)   18  05:45    


 


Hgb  12.3 gm/dL (12-16)   18  05:45    


 


Hct  36.1 % (41.0-60)  L  18  05:45    


 


MCV  93.7 fl (80-99)   18  05:45    


 


MCH  31.8 pg (27.0-31.0)  H  18  05:45    


 


MCHC Differential  34.0 pg (28.0-36.0)   18  05:45    


 


RDW  14.9 % (11.5-20.0)   18  05:45    


 


Plt Count  235 Th/cmm (150-400)   18  05:45    


 


MPV  7.7 fl  18  05:45    


 


Neutrophils %  72.5 % (40.0-80.0)   18  05:45    


 


Lymphocytes %  14.5 % (20.0-50.0)  L  18  05:45    


 


Monocytes %  12.3 % (2.0-10.0)  H  18  05:45    


 


Eosinophils %  0.4 % (0.0-5.0)   18  05:45    


 


Basophils %  0.3 % (0.0-2.0)   18  05:45    


 


Sodium  139 mEq/L (136-145)   18  05:45    


 


Potassium  3.9 mEq/L (3.5-5.1)   18  05:45    


 


Chloride  104 mEq/L ()   18  05:45    


 


Carbon Dioxide  23.4 mEq/L (21.0-31.0)   18  05:45    


 


Anion Gap  15.5  (7.0-16.0)   18  05:45    


 


BUN  23 mg/dL (7-25)   18  05:45    


 


Creatinine  1.0 mg/dL (0.7-1.3)   18  05:45    


 


Est GFR ( Amer)  > 60.0 ml/min (>90)   18  05:45    


 


Est GFR (Non-Af Amer)  > 60.0 ml/min  18  05:45    


 


BUN/Creatinine Ratio  23.0   18  05:45    


 


Glucose  78 mg/dL ()   18  05:45    


 


Calcium  9.2 mg/dL (8.6-10.3)   18  05:45    


 


Valproic Acid  60.7 ug/mL (50.0-100.0)   18  20:52    














- Physical Exam


Vitals and I&O: 


 Vital Signs











Temp  98.2 F   18 20:00


 


Pulse  83   18 20:00


 


Resp  19   18 20:00


 


BP  125/69   18 20:00


 


Pulse Ox  97   18 20:00








 Intake & Output











 18





 06:59 18:59 06:59


 


Intake Total 120 1600 


 


Balance 120 1600 


 


Intake:   


 


  Oral 120 1600 


 


Other:   


 


  # Voids 3 4 


 


  # Bowel Movements  1 











Active Medications: 


Current Medications





Acetaminophen (Tylenol)  650 mg PO Q4HR PRN


   PRN Reason: Mild Pain / Temp above 100


   Stop: 18 01:46


   Last Admin: 18 16:59 Dose:  650 mg


Al Hydrox/Mg Hydrox/Simethicone (Maalox)  30 ml PO Q4HR PRN


   PRN Reason: GI DISTRESS


   Stop: 18 01:46


   Last Admin: 08/10/18 23:17 Dose:  30 ml


Albuterol/Ipratropium (Duoneb Neb)  3 ml HHN Q2HRT PRN


   PRN Reason: Wheezing


   Stop: 18 08:00


Amiodarone HCl (Cordarone)  200 mg PO DAILY [facility]


   Stop: 18 08:59


   Last Admin: 18 08:46 Dose:  200 mg


Cyanocobalamin (Vitamin B12)  1,000 mcg PO DAILY [facility]


   Stop: 18 08:59


   Last Admin: 18 08:46 Dose:  1,000 mcg


Diltiazem HCl (Cardizem)  60 mg PO DAILY [facility]


   Stop: 18 08:59


   Last Admin: 18 08:45 Dose:  60 mg


Divalproex Sodium (Depakote Dr)  500 mg PO BID [facility]; Protocol


   Stop: 18 08:59


   Last Admin: 18 17:40 Dose:  Not Given


Docusate Sodium (Colace)  100 mg PO BID PRN


   PRN Reason: CONSTIPATION


   Stop: 18 08:00


   Last Admin: 08/10/18 23:17 Dose:  100 mg


Ferrous Sulfate (Iron)  325 mg PO DAILY [facility]


   Stop: 18 08:59


   Last Admin: 18 08:46 Dose:  325 mg


Gabapentin (Neurontin)  300 mg PO TID [facility]


   Stop: 18 08:59


   Last Admin: 18 21:03 Dose:  Not Given


Magnesium Hydroxide (Milk Of Magnesia)  30 ml PO HS PRN


   PRN Reason: Constipation


Mirtazapine (Remeron)  15 mg PO [facility]; Protocol


   Stop: 18 20:59


   Last Admin: 18 21:03 Dose:  Not Given


Multivitamins/Vitamin C (Theragran)  1 tab PO DAILY [facility]


   Stop: 18 08:59


   Last Admin: 18 08:46 Dose:  1 tab


Ondansetron HCl (Zofran Odt)  4 mg PO Q6H PRN


   PRN Reason: Nausea / Vomiting


   Stop: 18 05:59


   Last Admin: 08/10/18 23:18 Dose:  4 mg


Oxybutynin Chloride (Ditropan)  5 mg PO HS [facility]


   Stop: 18 20:59


   Last Admin: 18 21:03 Dose:  Not Given


Pantoprazole Sodium (Protonix)  40 mg PO BID [facility]


   Stop: 18 06:59


   Last Admin: 18 17:40 Dose:  Not Given


Quetiapine Fumarate (Seroquel)  200 mg PO HS [facility]; Protocol


   Stop: 10/03/18 20:59


   Last Admin: 18 21:04 Dose:  Not Given


Quetiapine Fumarate (Seroquel)  200 mg PO BID [facility]; Protocol


   Stop: 10/03/18 16:59


   Last Admin: 18 17:41 Dose:  Not Given


Sodium Chloride (Nacl Tab)  1 gm PO BID [facility]


   Stop: 18 08:59


   Last Admin: 18 17:41 Dose:  Not Given


Tamsulosin HCl (Flomax)  0.4 mg PO DAILY [facility]


   Stop: 18 08:59


   Last Admin: 18 08:46 Dose:  0.4 mg








General: weak, lethargic, congested, demented


HEENT: NC/AT, PERRLA, EOMI, anicteric sclerae, throat clear, thinning hair


Neck: Supple, No JVD, No LAD


Lungs: wheezing, ronchi


Cardiovascular: RRR, Normal S1


Abdomen: soft, non-tender, non-distended


Extremities: clear


Neurological: no change





Internal Medicine Assmt/Plan





- Assessment


Assessment: 


ALOC: on and off; observe closely.





Insomnia: on and off. Ambien PRN.





Self induced vomiting: education provided, close monitoring.





Noncompliance: education provided.





PNA: improving? pt refuses IVPB ABX.





H/O A. Fib: rate controlled.





Acute Psychosis: follow recommendation by Psychiatrist.





Agitation: sitter PRN





Leukocytosis: resolving?





s/p recent UGIB.





Nutritional Asmnt/Malnutr-PDOC





- Dietary Evaluation


Malnutrition Findings (Please click <Entered> for more info): 








Nutritional Asmnt/Malnutrition                             Start:  18 13:

17


Text:                                                      Status: Complete    

  


Freq:                                                                          

  


Protocol:                                                                      

  


 Document     18 13:17  LCHENG  (Rec: 18 13:22  [facility]  RACHELLE-[email or web address])


 Nutritional Asmnt/Malnutrition


     Patient General Information


      Nutritional Screening                      Moderate Risk


      Diagnosis                                  psychosis


      Pertinent Medical Hx/Surgical Hx           CAD, s/p MI, afib, anemia, BPH


                                                 , COPD, PNA, psychosis,


                                                 anxiety, depression


      Subjective Information                     Pt seen sleeping in bed at


                                                 time of visit. Per EMR, PO


                                                 intake 100% of meals. Per


                                                 nurse note, pt had episode of


                                                 vomiting x 1 last night.


      Current Diet Order/ Nutrition Support      [facility] soft chopped bland


      Pertinent Medications                      vit B12, colace, iron, remeron


                                                 , theragran, protonix, nacl


                                                 tab, seroquel


      Pertinent Labs                              nutritin labs WNL


     Nutritional Hx/Data


      Height                                     1.75 m


      Height (Calculated Centimeters)            175.3


      Current Weight (lbs)                       61.235 kg


      Weight (Calculated Kilograms)              61.2


      Weight (Calculated Grams)                  97314.0


      Ideal Body Weight                          160


      Body Mass Index (BMI)                      19.9


      Weight Status                              Approriate


     GI Symptoms


      GI Symptoms                                None


      Last BM                                    


      Difficult in:                              None


      Skin Integrity/Comment:                    intact


      Current %PO                                Good (%)


     Estimated Nutritional Goals


      BEE in Kcals:                              Using Current wt


      Calories/Kcals/Kg                          25-30


      Kcals Calculated                           2434-7684


      Protein:                                   Using Current wt


      Protein g/k


      Protein Calculated                         61


      Fluid: ml                                  1525-1830ml (1ml/kcal)


     Nutritional Problem


      No current Nutrition Prob


       Problem                                   N/A


     Malnutrition Alert


      Is there a minimum of two criteria         No


       selected?                                 


       Query Text:Check all the applicable       


       criteria. A minimum of two criteria are   


       recommended for diagnosis of either       


       severe or non-severe malnutrition.        


     Malnutrition Related to Morbid Obesity


      Malnutrition related to morbid obesity     No


     Intervention/Recommendation


      Comments                                   1. Continue with [facility] soft


                                                 chopped bland diet as ordered.


                                                 2. Monitor PO intake, wt, labs


                                                 and skin integrity


                                                 3. F/U as low risk in 7 days,


                                                 


     Expected Outcomes/Goals


      Expected Outcomes/Goals                    1. PO intake to meet at least


                                                 75% of nutritional needs.


                                                 2. Wt stability, skin to


                                                 remain intact, labs WNL.

## 2018-08-12 NOTE — PROGRESS NOTES
DATE:  08/08/2018



DATE OF SERVICE:  08/08/2018



SUBJECTIVE:  Chart reviewed and the patient interviewed.  Also discussed the

patient's condition with the staff and reviewed records and labs.  The patient

still has episodes of agitation and responding to stimuli.  Also, still has

episodes of banging doors.  The patient also still has mood swings and he still

needs lots of redirections.  He also is still ____ up and down and showing poor

impulse control.  The patient also is resisting care and resisting treatment. 

Otherwise, the patient is compliant with taking his medications with no side

effects of medications.



ASSESSMENT:  The patient is still responding and agitated.



TREATMENT PLAN:  We will continue to monitor behavior and condition closely. 

Also, we will get Depakote blood level.  Also continue to work on his

irritability and his agitation and continue to follow up.  Also, working with

 in regard to his ____.





DD: 08/11/2018 05:23

DT: 08/11/2018 19:53

JOB# [id number]  [id number]

## 2018-08-12 NOTE — PROGRESS NOTES
DATE:  



SUBJECTIVE:  The patient seen, chart reviewed, discussed with staff.  The

patient is currently in the hospital, poor orientation, withdrawn, telling

people he wants to be left alone, does not want to take medications, states he

does not need medications, refusing, currently in the hospital highly impulsive,

unpredictable, episodes of irritability, anger, suspicious at times, concerns

for underlying psychosis.



ASSESSMENT:  The patient is agitated, mostly withdrawn, ongoing behaviors,

refusing treatment and care, wanted to just be left alone in his room, needing a

lot of prompting, redirection.



PLAN:  We will continue to monitor.  Continue medications.  I did continue to

encourage med compliance.





DD: 08/12/2018 06:55

DT: 08/12/2018 07:41

JOB# [id number]  [id number]

## 2018-08-13 ENCOUNTER — HOSPITAL ENCOUNTER (INPATIENT)
Dept: HOSPITAL 36 - TELE | Age: 68
LOS: 8 days | Discharge: NURSING, REHAB OR LONG-TERM CARE | DRG: 177 | End: 2018-08-21
Payer: MEDICARE

## 2018-08-13 VITALS — DIASTOLIC BLOOD PRESSURE: 57 MMHG | SYSTOLIC BLOOD PRESSURE: 95 MMHG

## 2018-08-13 DIAGNOSIS — E87.6: ICD-10-CM

## 2018-08-13 DIAGNOSIS — N40.0: ICD-10-CM

## 2018-08-13 DIAGNOSIS — F31.9: ICD-10-CM

## 2018-08-13 DIAGNOSIS — J69.0: Primary | ICD-10-CM

## 2018-08-13 DIAGNOSIS — F29: ICD-10-CM

## 2018-08-13 DIAGNOSIS — F41.9: ICD-10-CM

## 2018-08-13 DIAGNOSIS — Z91.14: ICD-10-CM

## 2018-08-13 DIAGNOSIS — G93.41: ICD-10-CM

## 2018-08-13 DIAGNOSIS — K92.2: ICD-10-CM

## 2018-08-13 DIAGNOSIS — I25.2: ICD-10-CM

## 2018-08-13 DIAGNOSIS — R10.13: ICD-10-CM

## 2018-08-13 DIAGNOSIS — F03.90: ICD-10-CM

## 2018-08-13 DIAGNOSIS — R56.9: ICD-10-CM

## 2018-08-13 DIAGNOSIS — D64.9: ICD-10-CM

## 2018-08-13 DIAGNOSIS — K59.00: ICD-10-CM

## 2018-08-13 DIAGNOSIS — J44.0: ICD-10-CM

## 2018-08-13 DIAGNOSIS — I48.91: ICD-10-CM

## 2018-08-13 DIAGNOSIS — I25.10: ICD-10-CM

## 2018-08-13 PROCEDURE — Z7610: HCPCS

## 2018-08-13 RX ADMIN — PANTOPRAZOLE SODIUM SCH MG: 40 TABLET, DELAYED RELEASE ORAL at 17:36

## 2018-08-13 RX ADMIN — DILTIAZEM HYDROCHLORIDE SCH MG: 30 TABLET, FILM COATED ORAL at 09:00

## 2018-08-13 RX ADMIN — ALUMINUM HYDROXIDE, MAGNESIUM HYDROXIDE, AND SIMETHICONE PRN ML: 200; 200; 20 SUSPENSION ORAL at 09:27

## 2018-08-13 RX ADMIN — PANTOPRAZOLE SODIUM SCH MG: 40 TABLET, DELAYED RELEASE ORAL at 09:27

## 2018-08-14 LAB
ALBUMIN SERPL-MCNC: 3.4 GM/DL (ref 4.2–5.5)
ALBUMIN/GLOB SERPL: 1.1 {RATIO} (ref 1–1.8)
ALP SERPL-CCNC: 81 U/L (ref 34–104)
ALT SERPL-CCNC: 20 U/L (ref 7–52)
ANION GAP SERPL CALC-SCNC: 8.8 MMOL/L (ref 7–16)
AST SERPL-CCNC: 21 U/L (ref 13–39)
BASOPHILS # BLD AUTO: 0 TH/CUMM (ref 0–0.2)
BASOPHILS NFR BLD AUTO: 0.3 % (ref 0–2)
BILIRUB SERPL-MCNC: 0.3 MG/DL (ref 0.3–1)
BUN SERPL-MCNC: 18 MG/DL (ref 7–25)
CALCIUM SERPL-MCNC: 8.9 MG/DL (ref 8.6–10.3)
CHLORIDE SERPL-SCNC: 104 MEQ/L (ref 98–107)
CO2 SERPL-SCNC: 30.5 MEQ/L (ref 21–31)
CREAT SERPL-MCNC: 0.8 MG/DL (ref 0.7–1.3)
EOSINOPHIL # BLD AUTO: 0.1 TH/CMM (ref 0.1–0.4)
EOSINOPHIL NFR BLD AUTO: 0.7 % (ref 0–5)
ERYTHROCYTE [DISTWIDTH] IN BLOOD BY AUTOMATED COUNT: 14.6 % (ref 11.5–20)
GLOBULIN SER-MCNC: 3 GM/DL
GLUCOSE SERPL-MCNC: 125 MG/DL (ref 70–105)
HCT VFR BLD CALC: 35.2 % (ref 41–60)
HGB BLD-MCNC: 12.1 GM/DL (ref 12–16)
LYMPHOCYTE AB SER FC-ACNC: 2.3 TH/CMM (ref 1.5–3)
LYMPHOCYTES NFR BLD AUTO: 21.3 % (ref 20–50)
MCH RBC QN AUTO: 32.1 PG (ref 27–31)
MCHC RBC AUTO-ENTMCNC: 34.3 PG (ref 28–36)
MCV RBC AUTO: 93.6 FL (ref 80–99)
MONOCYTES # BLD AUTO: 1.6 TH/CMM (ref 0.3–1)
MONOCYTES NFR BLD AUTO: 14.7 % (ref 2–10)
NEUTROPHILS # BLD: 6.8 TH/CMM (ref 1.8–8)
NEUTROPHILS NFR BLD AUTO: 63 % (ref 40–80)
PCO2 BLDA: 45 MMHG (ref 35–45)
PLATELET # BLD: 289 TH/CMM (ref 150–400)
PMV BLD AUTO: 7.1 FL
PO2 BLDA: 159 MMHG (ref 80–100)
POTASSIUM SERPL-SCNC: 3.3 MEQ/L (ref 3.5–5.1)
RBC # BLD AUTO: 3.76 MIL/CMM (ref 3.8–5.8)
SAO2 % BLDA: 99 % (ref 92–100)
SODIUM SERPL-SCNC: 140 MEQ/L (ref 136–145)
WBC # BLD AUTO: 10.8 TH/CMM (ref 4.8–10.8)

## 2018-08-14 RX ADMIN — PANTOPRAZOLE SODIUM SCH MG: 40 TABLET, DELAYED RELEASE ORAL at 16:01

## 2018-08-14 RX ADMIN — PANTOPRAZOLE SODIUM SCH MG: 40 TABLET, DELAYED RELEASE ORAL at 09:42

## 2018-08-14 RX ADMIN — IPRATROPIUM BROMIDE AND ALBUTEROL SULFATE PRN ML: .5; 3 SOLUTION RESPIRATORY (INHALATION) at 22:20

## 2018-08-14 RX ADMIN — DEXTROSE AND SODIUM CHLORIDE SCH MLS/HR: 5; .9 INJECTION, SOLUTION INTRAVENOUS at 00:12

## 2018-08-14 RX ADMIN — Medication SCH TAB: at 09:41

## 2018-08-14 RX ADMIN — DILTIAZEM HYDROCHLORIDE SCH: 30 TABLET, FILM COATED ORAL at 09:42

## 2018-08-14 NOTE — PROGRESS NOTES
DATE:  08/13/2018



Case discussed with staff of the patient, reviewed records.  The staff reported

having coffee-ground vomiting and the medical doctor was called ____.  The

patient has been withdrawn, like to be left alone, and does not want to have

medication.  He believes he does not need medications, at times refusing

medications.  Continue irritable and restricted.  The medical doctor will be

taking care of his medical condition.  He is already on iron, Neurontin,

Remeron, Seroquel.  No side effects, no sedation or nausea, no extrapyramidal

symptoms.  We will continue the patient in group therapy, milieu therapy, and

adjust the medication as needed.





DD: 08/13/2018 13:07

DT: 08/14/2018 00:44

JOB# [id number]  [id number]

## 2018-08-14 NOTE — HISTORY & PHYSICAL
ADMIT DATE:  08/14/2018



CHIEF COMPLAINT:  Coffee-ground emesis by the nursing staff.



HISTORY OF PRESENT ILLNESS:  The patient is a 68-year-old male admitted to a

telemetry floor of [facility] after noticing 2 episodes of

coffee-ground emesis.  The patient had similar episodes like 3 weeks ago in the

nursing home.  After coming to the hospital this stopped.  However, for the past

few days while in Geropsych Unit the patient had coffee-ground emesis and this

was the main reason he was admitted to a telemetry floor of [facility].  Due to vomiting his potassium was low ____ today.  His H and H

however, was stable.  The patient is very confused and agitated from time to

time.  He also has additional history of pneumonia and I have ordered a chest

x-ray to see if this has improved or worsened.  If no improvement or worsens, I

will start the patient on IVPB antibiotics.



PAST MEDICAL HISTORY:  Pneumonia, COPD, atrial fibrillation, coronary artery

disease status post mild MI, BPH, urinary tract infection, sepsis, anxiety,

depression, and mild psychosis.



PAST SURGICAL HISTORY:  Denies significant past surgical history.



MEDICATIONS:  See medication reconciliation list.



ALLERGIES:  No known drug allergy.



FAMILY HISTORY:  Noncontributory.



SOCIAL HISTORY:  Smoked before, quit years ago.  No history of alcohol or IV

drug use.



REVIEW OF SYSTEMS:  As per HPI.



PHYSICAL EXAMINATION:

GENERAL:  A well-developed male in no acute distress.

SKIN:  There are some excoriations.

VITAL SIGNS:  Basically stable.

HEENT:  Normocephalic, atraumatic.  Pupils equal, round, react to light and

accommodation.

CHEST:  Symmetrical.

LUNGS:  Few wheezing appreciated with rhonchi as well.

CARDIAC:  Normal sinus rhythm, S1, S2.

ABDOMEN:  Benign, soft, nontender.

EXTREMITIES:  No clubbing, cyanosis or edema, bilaterally 2+ ____.

NEUROLOGIC:  Unremarkable.



LABORATORY DATA:  Reviewed, significant for hypokalemia.



ASSESSMENT AND PLAN:

1.  Acute upper gastrointestinal bleeding:  The patient is kept n.p.o. and IV

fluid ordered and Protonix is also ordered and GI consultation appreciated.  The

patient is, however, stable hemodynamically.

2.  His recent pneumonia:  A repeat chest x-ray.  We will treat it with

antibiotics.

3.  Altered level of consciousness due to metabolic encephalopathy and dementia.

4.  ____ we will observe closely.

5.  History of mild psychosis with bipolar disorder:  Adjust medication if

necessary.

6.  He has severe weakness:  Fall precaution.

7.  History of atrial fibrillation with rate controlled.

8.  DVT prophylaxis.





DD: 08/14/2018 08:17

DT: 08/14/2018 09:25

JOB# [id number]  [id number]

## 2018-08-14 NOTE — DISCHARGE SUMMARY
DATE OF DISCHARGE:  08/13/2018



FINAL DIAGNOSES:

1.  Pneumonia, improved clinically.

2.  Altered level of consciousness, improved.

3.  Psychosis, stabilized.

4.  Noncompliance with education provided.

5.  Upper GI bleeding.  Transfer to telemetry floor.



HOSPITAL COURSE:  The patient is a 68-year-old male admitted due to multiple

complex medical conditions.  The patient had acute psychosis with anxiety,

agitation.  He also had pneumonia, atrial fibrillation with rate control.  The

patient was noncompliant but education provided with some improvement.  Due to

acute multiple episodes of upper GI bleeding, the patient was transferred to

telemetry floor.



DISCHARGE CONDITION:  Stable.



DISPOSITION:  Telemetry floor of [facility].



DISCHARGE MEDICATIONS:  Continue medication from here.



DIET:  To keep n.p.o.



ACTIVITY:  Bed rest.



FOLLOWUP:  Same day in telemetry floor of [facility].





DD: 08/14/2018 08:19

DT: 08/14/2018 09:31

JOB# [id number]  [id number]

## 2018-08-14 NOTE — DIAGNOSTIC IMAGING REPORT
CHEST X-RAY: AP view



INDICATION: Pneumonia



COMPARISON: 7/28/2018



FINDINGS: Suboptimal lung volumes are seen with increased interstitial

lung markings.  There appear to be areas of scarring along the right mid

to upper lung zone.  There is elevation of the right hemidiaphragm.  No

focal consolidation or effusions.  Borderline prominent heart is noted

with left ventricular configuration.  Atherosclerosis is noted. 

Gas-filled loops of bowel and stool are seen along the upper abdomen



IMPRESSION:



Increased interstitial lung markings which may represent a mild degree

of congestion.  Note, focal infiltrates of the lung bases cannot be

excluded.



Borderline prominent heart with left ventricular configuration.  Please

correlate clinically for possible hypertension.



Gaseous distended loops of bowel and stool along the upper abdomen.

## 2018-08-14 NOTE — CONSULTATION
DATE OF CONSULTATION:  08/14/2018



REQUESTING PHYSICIAN:  Dr. Limon.



REASON FOR CONSULTATION:  Coffee-ground emesis.



Thank you for asking me to see this patient in consultation.



This is a 68-year-old male that was originally admitted to the Geropsych unit

who had 2 episodes of coffee-ground emesis having had similar episodes like this

3 weeks ago at the nursing home.  After coming to this side of the hospital the

patient states that this had stopped.  The patient was also complaining of some

vague indigestion that he has been having as well.  His hemoglobin has been

stable.  Nonetheless, the patient states that he feels nauseous and that he is

concerned for the fact that he has been vomiting what looked like dark blood.



PAST MEDICAL HISTORY:  Pneumonia, COPD, atrial fibrillation, coronary artery

disease.



MEDICATIONS:  Have been reviewed.



PAST SURGICAL HISTORY:  Denies any past surgical history, but he does have a

midline laparotomy scar.



ALLERGIES:  None known.



FAMILY HISTORY:  Noncontributory for GI disease.



SOCIAL HISTORY:  He quit smoking years ago.  No history of alcohol or IV drugs.



REVIEW OF SYSTEMS:  As in HPI.  All other 12-point systems were negative.



PHYSICAL EXAMINATION:

VITAL SIGNS:  Noted.

GENERAL:  Well-developed male in no acute distress.

SKIN:  Some excoriations.

HEENT:  Normocephalic, atraumatic.  PERRL positive.

LUNGS:  Clear bilaterally.  No wheezes, rales or rhonchi.

ABDOMEN:  Soft, nontender, bowel sounds are positive.  There is a single midline

incisional scar.

EXTREMITIES:  Show no lower extremity edema.

Psychologically, he is alert.

NEUROLOGIC:  Grossly intact.



LABORATORY DATA:  White count of 10.8, hemoglobin 12.1, hematocrit 35.2,

platelet count of 289,000.  Sodium 140, potassium 3.3, chloride 104, CO2 30, BUN

18, creatinine 0.8.



IMAGING:  Chest x-ray, increased interstitial lung markings which may represent

a mild degree of congestion.



Overall, we have a 68-year-old male who presents from Geropsych Unit with

complaints of increased coffee-ground emesis, abdominal pain.

1.  Coffee-ground emesis.

2.  Concern for upper GI bleed.

3.  Abdominal discomfort.

4.  Concern for possible esophagitis versus peptic ulcer disease.



I would like to further assess with an upper endoscopy in this patient,

especially with recurrent symptoms over the past month and decide if patient has

any evidence that he needs to be on further treatment for and rule out any

esophagitis, peptic ulcer disease, or even gastric malignancy.



Currently, we are working to discuss this matter with the conservator and get

appropriate consent and documentation prior to performing any procedures with

anesthesia.



Discussed risks and benefits with the patient; however, would like to ensure

that we have proper consent prior to proceeding with the procedure.



For now, the patient can be on clear liquid diet and PPI therapy.  We will keep

n.p.o. after midnight for potential EGD tomorrow.



Thank you for this consultation.





DD: 08/14/2018 09:34

DT: 08/14/2018 11:39

JOB# [id number]  [id number]

## 2018-08-15 RX ADMIN — PANTOPRAZOLE SODIUM SCH MG: 40 TABLET, DELAYED RELEASE ORAL at 16:48

## 2018-08-15 RX ADMIN — MAGNESIUM HYDROXIDE PRN ML: 400 SUSPENSION ORAL at 12:23

## 2018-08-15 RX ADMIN — IPRATROPIUM BROMIDE AND ALBUTEROL SULFATE PRN ML: .5; 3 SOLUTION RESPIRATORY (INHALATION) at 21:21

## 2018-08-15 RX ADMIN — Medication SCH TAB: at 08:35

## 2018-08-15 RX ADMIN — DILTIAZEM HYDROCHLORIDE SCH: 30 TABLET, FILM COATED ORAL at 08:34

## 2018-08-15 RX ADMIN — DEXTROSE AND SODIUM CHLORIDE SCH MLS/HR: 5; .9 INJECTION, SOLUTION INTRAVENOUS at 20:51

## 2018-08-15 RX ADMIN — DEXTROSE AND SODIUM CHLORIDE SCH MLS/HR: 5; .9 INJECTION, SOLUTION INTRAVENOUS at 04:44

## 2018-08-15 RX ADMIN — PANTOPRAZOLE SODIUM SCH MG: 40 TABLET, DELAYED RELEASE ORAL at 06:34

## 2018-08-15 NOTE — GI PROGRESS NOTE
Subjective





- Review of Systems


Service Date: 08/15/18


Subjective: 





Pt not having any more episodes of vomiting blood. H+H stable





Objective





- Results


Result Diagrams: 


 08/14/18 06:00





 08/14/18 06:00


Recent Labs: 


 Laboratory Last Values











WBC  10.8 Th/cmm (4.8-10.8)   08/14/18  06:00    


 


RBC  3.76 Mil/cmm (3.80-5.80)  L  08/14/18  06:00    


 


Hgb  12.1 gm/dL (12-16)   08/14/18  06:00    


 


Hct  35.2 % (41.0-60)  L  08/14/18  06:00    


 


MCV  93.6 fl (80-99)   08/14/18  06:00    


 


MCH  32.1 pg (27.0-31.0)  H  08/14/18  06:00    


 


MCHC Differential  34.3 pg (28.0-36.0)   08/14/18  06:00    


 


RDW  14.6 % (11.5-20.0)   08/14/18  06:00    


 


Plt Count  289 Th/cmm (150-400)   08/14/18  06:00    


 


MPV  7.1 fl  08/14/18  06:00    


 


Neutrophils %  63.0 % (40.0-80.0)   08/14/18  06:00    


 


Lymphocytes %  21.3 % (20.0-50.0)   08/14/18  06:00    


 


Monocytes %  14.7 % (2.0-10.0)  H  08/14/18  06:00    


 


Eosinophils %  0.7 % (0.0-5.0)   08/14/18  06:00    


 


Basophils %  0.3 % (0.0-2.0)   08/14/18  06:00    


 


Specimen Source  ARTERIAL   08/14/18  22:28    


 


Sample Site  Right Radial   08/14/18  22:28    


 


pH  7.38  (7.35-7.45)   08/14/18  22:28    


 


pCO2  45.0 mmHg (35.0-45.0)   08/14/18  22:28    


 


pO2  159.0 mmHg (80.0-100.0)  H  08/14/18  22:28    


 


HCO3  25.8 mEq/L (20.0-26.0)   08/14/18  22:28    


 


Base Excess  1.1 mEq/L (-3.0-3.0)   08/14/18  22:28    


 


O2 Saturation  99.0 % (92.0-100.0)   08/14/18  22:28    


 


Mata Test  Positive   08/14/18  22:28    


 


Vent Rate  N/A   08/14/18  22:28    


 


Inspired O2  100   08/14/18  22:28    


 


Tidal Volume  N/A   08/14/18  22:28    


 


PEEP  N/A   08/14/18  22:28    


 


Pressure (ins/psv/peep)  N/A   08/14/18  22:28    


 


Critical Value  MM,RCP   08/14/18  22:28    


 


Sodium  140 mEq/L (136-145)   08/14/18  06:00    


 


Potassium  3.3 mEq/L (3.5-5.1)  L  08/14/18  06:00    


 


Chloride  104 mEq/L ()   08/14/18  06:00    


 


Carbon Dioxide  30.5 mEq/L (21.0-31.0)   08/14/18  06:00    


 


Anion Gap  8.8  (7.0-16.0)   08/14/18  06:00    


 


BUN  18 mg/dL (7-25)   08/14/18  06:00    


 


Creatinine  0.8 mg/dL (0.7-1.3)   08/14/18  06:00    


 


Est GFR ( Amer)  > 60.0 ml/min (>90)   08/14/18  06:00    


 


Est GFR (Non-Af Amer)  > 60.0 ml/min  08/14/18  06:00    


 


BUN/Creatinine Ratio  22.5   08/14/18  06:00    


 


Glucose  125 mg/dL ()  H  08/14/18  06:00    


 


POC Glucose  137 MG/DL (70 - 105)  H  08/13/18  22:22    


 


Calcium  8.9 mg/dL (8.6-10.3)   08/14/18  06:00    


 


Total Bilirubin  0.3 mg/dL (0.3-1.0)   08/14/18  06:00    


 


AST  21 U/L (13-39)   08/14/18  06:00    


 


ALT  20 U/L (7-52)   08/14/18  06:00    


 


Alkaline Phosphatase  81 U/L ()   08/14/18  06:00    


 


Total Protein  6.4 gm/dL (6.0-8.3)   08/14/18  06:00    


 


Albumin  3.4 gm/dL (4.2-5.5)  L  08/14/18  06:00    


 


Globulin  3.0 gm/dL  08/14/18  06:00    


 


Albumin/Globulin Ratio  1.1  (1.0-1.8)   08/14/18  06:00    














- Physical Exam


Vitals and I&O: 


 Vital Signs











Temp  98.4 F   08/15/18 04:00


 


Pulse  55   08/15/18 08:36


 


Resp  18   08/15/18 08:04


 


BP  111/77   08/15/18 04:00


 


Pulse Ox  94   08/15/18 04:00








 Intake & Output











 08/14/18 08/15/18 08/15/18





 18:59 06:59 18:59


 


Intake Total 1450 100 


 


Balance 1450 100 


 


Weight (lbs) 60.736 kg  


 


Intake:   


 


  Intake, IV Amount 1000 100 


 


    D5-0.9%Ns 1,000 ml @ 80 1000  





    mls/hr IV .[id number] [facility] Rx   





    #:[id number]   


 


    Piperacillin Sodium/  100 





    Tazobact 3.375 gm In   





    Sodium Chloride 0.9% 50   





    ml @ 100 mls/hr IV Q8HR   





    [facility] Rx#:[id number]   


 


  Oral 450  


 


Other:   


 


  # Voids 3  


 


  Weight Source Bedscale  











Active Medications: 


Current Medications





Albuterol/Ipratropium (Duoneb Neb)  3 ml HHN Q2H PRN


   PRN Reason: Wheezing


   Stop: 10/13/18 08:18


   Last Admin: 08/14/18 22:20 Dose:  3 ml


Amiodarone HCl (Cordarone)  200 mg PO DAILY [facility]


   Stop: 10/13/18 08:59


   Last Admin: 08/15/18 08:36 Dose:  Not Given


Cyanocobalamin (Vitamin B12)  1,000 mcg PO DAILY [facility]


   Stop: 10/13/18 08:59


   Last Admin: 08/15/18 08:36 Dose:  1,000 mcg


Diltiazem HCl (Cardizem)  60 mg PO DAILY [facility]


   Stop: 10/13/18 08:59


   Last Admin: 08/15/18 08:34 Dose:  Not Given


Divalproex Sodium (Depakote Dr)  500 mg PO BID [facility]; Protocol


   Stop: 10/13/18 08:59


   Last Admin: 08/15/18 08:35 Dose:  500 mg


Docusate Sodium (Colace)  100 mg PO BID PRN


   PRN Reason: CONSTIPATION


   Stop: 10/13/18 08:18


   Last Admin: 08/14/18 21:58 Dose:  100 mg


Docusate Sodium (Colace)  100 mg PO BID PRN


   PRN Reason: Loose Stools


Gabapentin (Neurontin)  300 mg PO TID [facility]


   Stop: 10/13/18 08:59


   Last Admin: 08/15/18 08:35 Dose:  300 mg


Dextrose/Sodium Chloride (D5-0.9%Ns)  1,000 mls @ 80 mls/hr IV .[id number] [facility]


   Stop: 10/14/18 00:29


   Last Admin: 08/15/18 04:44 Dose:  80 mls/hr


Piperacillin Sod/Tazobactam (Sod 3.375 gm/ Sodium Chloride)  50 mls @ 100 mls/

hr IV Q8HR [facility]


   Stop: 10/13/18 20:59


   Last Infusion: 08/15/18 05:14 Dose:  Infused


Lorazepam (Ativan)  2 mg IVP Q6H PRN; Protocol


   PRN Reason: Agitation


   Stop: 10/13/18 08:20


   Last Admin: 08/14/18 16:50 Dose:  2 mg


Magnesium Hydroxide (Milk Of Magnesia)  30 ml PO PRN PRN


   PRN Reason: Constipation


   Stop: 10/14/18 09:32


Mirtazapine (Remeron)  15 mg PO HS [facility]; Protocol


   Stop: 10/13/18 20:59


   Last Admin: 08/14/18 21:32 Dose:  15 mg


Olanzapine (Zyprexa)  5 mg PO DAILY [facility]; Protocol


   Stop: 10/13/18 08:59


   Last Admin: 08/15/18 08:35 Dose:  5 mg


Olanzapine (Zyprexa)  10 mg PO HS [facility]; Protocol


   Stop: 10/13/18 20:59


   Last Admin: 08/14/18 21:32 Dose:  10 mg


Ondansetron HCl (Zofran)  4 mg IV Q4H PRN


   PRN Reason: Nausea / Vomiting


   Stop: 10/12/18 23:06


   Last Admin: 08/15/18 00:34 Dose:  4 mg


Oxybutynin Chloride (Ditropan)  5 mg PO HS [facility]


   Stop: 10/13/18 20:59


   Last Admin: 08/14/18 21:32 Dose:  5 mg


Pantoprazole Sodium (Protonix)  40 mg PO BIDAC [facility]


   Stop: 10/13/18 08:59


   Last Admin: 08/15/18 06:34 Dose:  40 mg


Sodium Chloride (Nacl Tab)  1 gm PO BID [facility]


   Stop: 10/13/18 08:59


   Last Admin: 08/15/18 08:36 Dose:  1 gm


Sodium Phosphate (Fleet Enema)  135 ml RC PRN PRN


   PRN Reason: Constipation


   Stop: 10/14/18 09:34


Tamsulosin HCl (Flomax)  0.4 mg PO DAILY [facility]


   Stop: 10/13/18 08:59


   Last Admin: 08/15/18 08:35 Dose:  0.4 mg








General: Alert, No acute distress


HEENT: Atraumatic, PERRLA


Neck: Supple


Cardiovascular: Regular rate, Normal S1, Normal S2


Lungs: Clear to auscultation, Normal air movement


Abdomen: Bowel sounds, Soft





Assessment/Plan





- Assessment


Assessment: 





1. Concern for coffee ground emesis


2. Dyspepsia


3. Psychosis





-Start patient on PPI BID


-Can hold off on EGD, not emergent


-If recurrence of symptoms, would highly recommend


-Gi will follow

## 2018-08-15 NOTE — INTERNAL MEDICINE PROG NOTE
Internal Medicine Subjective





- Subjective


Service Date: 08/15/18


Patient seen and examined:: without staff


Patient is:: awake, verbal, eyes closed, in bed, agitated, confused


Per staff patient has:: no adverse event





Internal Medicine Objective





- Results


Result Diagrams: 


 08/14/18 06:00





 08/14/18 06:00


Recent Labs: 


 Laboratory Last Values











WBC  10.8 Th/cmm (4.8-10.8)   08/14/18  06:00    


 


RBC  3.76 Mil/cmm (3.80-5.80)  L  08/14/18  06:00    


 


Hgb  12.1 gm/dL (12-16)   08/14/18  06:00    


 


Hct  35.2 % (41.0-60)  L  08/14/18  06:00    


 


MCV  93.6 fl (80-99)   08/14/18  06:00    


 


MCH  32.1 pg (27.0-31.0)  H  08/14/18  06:00    


 


MCHC Differential  34.3 pg (28.0-36.0)   08/14/18  06:00    


 


RDW  14.6 % (11.5-20.0)   08/14/18  06:00    


 


Plt Count  289 Th/cmm (150-400)   08/14/18  06:00    


 


MPV  7.1 fl  08/14/18  06:00    


 


Neutrophils %  63.0 % (40.0-80.0)   08/14/18  06:00    


 


Lymphocytes %  21.3 % (20.0-50.0)   08/14/18  06:00    


 


Monocytes %  14.7 % (2.0-10.0)  H  08/14/18  06:00    


 


Eosinophils %  0.7 % (0.0-5.0)   08/14/18  06:00    


 


Basophils %  0.3 % (0.0-2.0)   08/14/18  06:00    


 


Specimen Source  ARTERIAL   08/14/18  22:28    


 


Sample Site  Right Radial   08/14/18  22:28    


 


pH  7.38  (7.35-7.45)   08/14/18  22:28    


 


pCO2  45.0 mmHg (35.0-45.0)   08/14/18  22:28    


 


pO2  159.0 mmHg (80.0-100.0)  H  08/14/18  22:28    


 


HCO3  25.8 mEq/L (20.0-26.0)   08/14/18  22:28    


 


Base Excess  1.1 mEq/L (-3.0-3.0)   08/14/18  22:28    


 


O2 Saturation  99.0 % (92.0-100.0)   08/14/18  22:28    


 


Mata Test  Positive   08/14/18  22:28    


 


Vent Rate  N/A   08/14/18  22:28    


 


Inspired O2  100   08/14/18  22:28    


 


Tidal Volume  N/A   08/14/18 22:28    


 


PEEP  N/A   08/14/18  22:28    


 


Pressure (ins/psv/peep)  N/A   08/14/18  22:28    


 


Critical Value  MM,RCP   08/14/18  22:28    


 


Sodium  140 mEq/L (136-145)   08/14/18  06:00    


 


Potassium  3.3 mEq/L (3.5-5.1)  L  08/14/18  06:00    


 


Chloride  104 mEq/L ()   08/14/18  06:00    


 


Carbon Dioxide  30.5 mEq/L (21.0-31.0)   08/14/18  06:00    


 


Anion Gap  8.8  (7.0-16.0)   08/14/18  06:00    


 


BUN  18 mg/dL (7-25)   08/14/18  06:00    


 


Creatinine  0.8 mg/dL (0.7-1.3)   08/14/18  06:00    


 


Est GFR ( Amer)  > 60.0 ml/min (>90)   08/14/18  06:00    


 


Est GFR (Non-Af Amer)  > 60.0 ml/min  08/14/18  06:00    


 


BUN/Creatinine Ratio  22.5   08/14/18  06:00    


 


Glucose  125 mg/dL ()  H  08/14/18  06:00    


 


POC Glucose  137 MG/DL (70 - 105)  H  08/13/18  22:22    


 


Calcium  8.9 mg/dL (8.6-10.3)   08/14/18  06:00    


 


Total Bilirubin  0.3 mg/dL (0.3-1.0)   08/14/18  06:00    


 


AST  21 U/L (13-39)   08/14/18  06:00    


 


ALT  20 U/L (7-52)   08/14/18  06:00    


 


Alkaline Phosphatase  81 U/L ()   08/14/18  06:00    


 


Total Protein  6.4 gm/dL (6.0-8.3)   08/14/18  06:00    


 


Albumin  3.4 gm/dL (4.2-5.5)  L  08/14/18  06:00    


 


Globulin  3.0 gm/dL  08/14/18  06:00    


 


Albumin/Globulin Ratio  1.1  (1.0-1.8)   08/14/18  06:00    














- Physical Exam


Vitals and I&O: 


 Vital Signs











Temp  98.4 F   08/15/18 04:00


 


Pulse  55   08/15/18 08:36


 


Resp  18   08/15/18 08:04


 


BP  111/77   08/15/18 04:00


 


Pulse Ox  94   08/15/18 04:00








 Intake & Output











 08/14/18 08/15/18 08/15/18





 18:59 06:59 18:59


 


Intake Total 1450 100 


 


Balance 1450 100 


 


Weight (lbs) 60.736 kg  


 


Intake:   


 


  Intake, IV Amount 1000 100 


 


    D5-0.9%Ns 1,000 ml @ 80 1000  





    mls/hr IV .[id number] [facility] Rx   





    #:[id number]   


 


    Piperacillin Sodium/  100 





    Tazobact 3.375 gm In   





    Sodium Chloride 0.9% 50   





    ml @ 100 mls/hr IV Q8HR   





    [facility] Rx#:[id number]   


 


  Oral 450  


 


Other:   


 


  # Voids 3  


 


  Weight Source Bedscale  











Active Medications: 


Current Medications





Albuterol/Ipratropium (Duoneb Neb)  3 ml HHN Q2H PRN


   PRN Reason: Wheezing


   Stop: 10/13/18 08:18


   Last Admin: 08/14/18 22:20 Dose:  3 ml


Amiodarone HCl (Cordarone)  200 mg PO DAILY [facility]


   Stop: 10/13/18 08:59


   Last Admin: 08/15/18 08:36 Dose:  Not Given


Cyanocobalamin (Vitamin B12)  1,000 mcg PO DAILY [facility]


   Stop: 10/13/18 08:59


   Last Admin: 08/15/18 08:36 Dose:  1,000 mcg


Diltiazem HCl (Cardizem)  60 mg PO DAILY [facility]


   Stop: 10/13/18 08:59


   Last Admin: 08/15/18 08:34 Dose:  Not Given


Divalproex Sodium (Depakote Dr)  500 mg PO BID [facility]; Protocol


   Stop: 10/13/18 08:59


   Last Admin: 08/15/18 08:35 Dose:  500 mg


Docusate Sodium (Colace)  100 mg PO BID PRN


   PRN Reason: CONSTIPATION


   Stop: 10/13/18 08:18


   Last Admin: 08/14/18 21:58 Dose:  100 mg


Docusate Sodium (Colace)  100 mg PO BID PRN


   PRN Reason: Loose Stools


Gabapentin (Neurontin)  300 mg PO TID [facility]


   Stop: 10/13/18 08:59


   Last Admin: 08/15/18 08:35 Dose:  300 mg


Dextrose/Sodium Chloride (D5-0.9%Ns)  1,000 mls @ 80 mls/hr IV .[id number] [facility]


   Stop: 10/14/18 00:29


   Last Admin: 08/15/18 04:44 Dose:  80 mls/hr


Piperacillin Sod/Tazobactam (Sod 3.375 gm/ Sodium Chloride)  50 mls @ 100 mls/

hr IV Q8HR MADDY


   Stop: 10/13/18 20:59


   Last Infusion: 08/15/18 05:14 Dose:  Infused


Lorazepam (Ativan)  2 mg IVP Q6H PRN; Protocol


   PRN Reason: Agitation


   Stop: 10/13/18 08:20


   Last Admin: 08/14/18 16:50 Dose:  2 mg


Mirtazapine (Remeron)  15 mg PO HS MADDY; Protocol


   Stop: 10/13/18 20:59


   Last Admin: 08/14/18 21:32 Dose:  15 mg


Olanzapine (Zyprexa)  5 mg PO DAILY MADDY; Protocol


   Stop: 10/13/18 08:59


   Last Admin: 08/15/18 08:35 Dose:  5 mg


Olanzapine (Zyprexa)  10 mg PO HS MADDY; Protocol


   Stop: 10/13/18 20:59


   Last Admin: 08/14/18 21:32 Dose:  10 mg


Ondansetron HCl (Zofran)  4 mg IV Q4H PRN


   PRN Reason: Nausea / Vomiting


   Stop: 10/12/18 23:06


   Last Admin: 08/15/18 00:34 Dose:  4 mg


Oxybutynin Chloride (Ditropan)  5 mg PO HS MADDY


   Stop: 10/13/18 20:59


   Last Admin: 08/14/18 21:32 Dose:  5 mg


Pantoprazole Sodium (Protonix)  40 mg PO BIDAC [facility]


   Stop: 10/13/18 08:59


   Last Admin: 08/15/18 06:34 Dose:  40 mg


Sodium Chloride (Nacl Tab)  1 gm PO BID MADDY


   Stop: 10/13/18 08:59


   Last Admin: 08/15/18 08:36 Dose:  1 gm


Tamsulosin HCl (Flomax)  0.4 mg PO DAILY MADDY


   Stop: 10/13/18 08:59


   Last Admin: 08/15/18 08:35 Dose:  0.4 mg








General: weak, lethargic, congested, thin


HEENT: NC/AT, PERRLA, EOMI, anicteric sclerae, throat clear


Neck: Supple, No JVD, No thyromegaly, No LAD


Lungs: congested, wheezing, ronchi


Cardiovascular: RRR, Normal S1, Normal S2


Abdomen: soft


Extremities: clear


Neurological: no change





Internal Medicine Assmt/Plan





- Assessment


Assessment: 





ALOC: multifactorial; observe closely.





s/p UGIB? H/H stable.





Psychosis: monitoring closely and adjust meds as needed.





PNA: due to aspiration?  IVPB ABX





COPD: RT protocol.





H/O A. Fib: rate controlled.





Noncompliance: education provided.





DVT prophylaxis.

## 2018-08-16 LAB
ANION GAP SERPL CALC-SCNC: 8.9 MMOL/L (ref 7–16)
BASOPHILS # BLD AUTO: 0.1 TH/CUMM (ref 0–0.2)
BASOPHILS NFR BLD AUTO: 0.7 % (ref 0–2)
BUN SERPL-MCNC: 10 MG/DL (ref 7–25)
CALCIUM SERPL-MCNC: 8.3 MG/DL (ref 8.6–10.3)
CHLORIDE SERPL-SCNC: 111 MEQ/L (ref 98–107)
CO2 SERPL-SCNC: 25.3 MEQ/L (ref 21–31)
CREAT SERPL-MCNC: 0.7 MG/DL (ref 0.7–1.3)
EOSINOPHIL # BLD AUTO: 0.2 TH/CMM (ref 0.1–0.4)
EOSINOPHIL NFR BLD AUTO: 1.9 % (ref 0–5)
ERYTHROCYTE [DISTWIDTH] IN BLOOD BY AUTOMATED COUNT: 14.8 % (ref 11.5–20)
GLUCOSE SERPL-MCNC: 120 MG/DL (ref 70–105)
HCT VFR BLD CALC: 35.4 % (ref 41–60)
HGB BLD-MCNC: 11.8 GM/DL (ref 12–16)
LYMPHOCYTE AB SER FC-ACNC: 0.9 TH/CMM (ref 1.5–3)
LYMPHOCYTES NFR BLD AUTO: 10.5 % (ref 20–50)
MCH RBC QN AUTO: 31.3 PG (ref 27–31)
MCHC RBC AUTO-ENTMCNC: 33.4 PG (ref 28–36)
MCV RBC AUTO: 93.8 FL (ref 80–99)
MONOCYTES # BLD AUTO: 0.6 TH/CMM (ref 0.3–1)
MONOCYTES NFR BLD AUTO: 7.8 % (ref 2–10)
NEUTROPHILS # BLD: 6.4 TH/CMM (ref 1.8–8)
NEUTROPHILS NFR BLD AUTO: 79.1 % (ref 40–80)
PLATELET # BLD: 256 TH/CMM (ref 150–400)
PMV BLD AUTO: 7.2 FL
POTASSIUM SERPL-SCNC: 3.2 MEQ/L (ref 3.5–5.1)
RBC # BLD AUTO: 3.78 MIL/CMM (ref 3.8–5.8)
SODIUM SERPL-SCNC: 142 MEQ/L (ref 136–145)
WBC # BLD AUTO: 8.2 TH/CMM (ref 4.8–10.8)

## 2018-08-16 RX ADMIN — DILTIAZEM HYDROCHLORIDE SCH MG: 30 TABLET, FILM COATED ORAL at 09:30

## 2018-08-16 RX ADMIN — IPRATROPIUM BROMIDE AND ALBUTEROL SULFATE PRN ML: .5; 3 SOLUTION RESPIRATORY (INHALATION) at 03:00

## 2018-08-16 RX ADMIN — MAGNESIUM HYDROXIDE PRN ML: 400 SUSPENSION ORAL at 10:26

## 2018-08-16 RX ADMIN — PANTOPRAZOLE SODIUM SCH MG: 40 TABLET, DELAYED RELEASE ORAL at 16:42

## 2018-08-16 RX ADMIN — PANTOPRAZOLE SODIUM SCH MG: 40 TABLET, DELAYED RELEASE ORAL at 09:30

## 2018-08-16 RX ADMIN — Medication SCH: at 09:31

## 2018-08-16 NOTE — GI PROGRESS NOTE
Subjective





- Review of Systems


Service Date: 08/16/18


Subjective: 





PATRICIA ORAL DIET.


NO N/V OR GI BLEEDING.


HAD HARD STOOL - GIVEN ENEMA.





Objective





- Results


Result Diagrams: 


 08/16/18 04:55





 08/16/18 04:55


Recent Labs: 


 Laboratory Last Values











WBC  8.2 Th/cmm (4.8-10.8)   08/16/18  04:55    


 


RBC  3.78 Mil/cmm (3.80-5.80)  L  08/16/18  04:55    


 


Hgb  11.8 gm/dL (12-16)  L  08/16/18  04:55    


 


Hct  35.4 % (41.0-60)  L  08/16/18  04:55    


 


MCV  93.8 fl (80-99)   08/16/18  04:55    


 


MCH  31.3 pg (27.0-31.0)  H  08/16/18  04:55    


 


MCHC Differential  33.4 pg (28.0-36.0)   08/16/18  04:55    


 


RDW  14.8 % (11.5-20.0)   08/16/18  04:55    


 


Plt Count  256 Th/cmm (150-400)   08/16/18  04:55    


 


MPV  7.2 fl  08/16/18  04:55    


 


Neutrophils %  79.1 % (40.0-80.0)   08/16/18  04:55    


 


Lymphocytes %  10.5 % (20.0-50.0)  L  08/16/18  04:55    


 


Monocytes %  7.8 % (2.0-10.0)   08/16/18  04:55    


 


Eosinophils %  1.9 % (0.0-5.0)   08/16/18  04:55    


 


Basophils %  0.7 % (0.0-2.0)   08/16/18  04:55    


 


Specimen Source  ARTERIAL   08/14/18  22:28    


 


Sample Site  Right Radial   08/14/18  22:28    


 


pH  7.38  (7.35-7.45)   08/14/18  22:28    


 


pCO2  45.0 mmHg (35.0-45.0)   08/14/18  22:28    


 


pO2  159.0 mmHg (80.0-100.0)  H  08/14/18  22:28    


 


HCO3  25.8 mEq/L (20.0-26.0)   08/14/18  22:28    


 


Base Excess  1.1 mEq/L (-3.0-3.0)   08/14/18  22:28    


 


O2 Saturation  99.0 % (92.0-100.0)   08/14/18  22:28    


 


Mata Test  Positive   08/14/18  22:28    


 


Vent Rate  N/A   08/14/18  22:28    


 


Inspired O2  100   08/14/18  22:28    


 


Tidal Volume  N/A   08/14/18  22:28    


 


PEEP  N/A   08/14/18  22:28    


 


Pressure (ins/psv/peep)  N/A   08/14/18  22:28    


 


Critical Value  MM,RCP   08/14/18  22:28    


 


Sodium  142 mEq/L (136-145)   08/16/18  04:55    


 


Potassium  3.2 mEq/L (3.5-5.1)  L  08/16/18  04:55    


 


Chloride  111 mEq/L ()  H  08/16/18  04:55    


 


Carbon Dioxide  25.3 mEq/L (21.0-31.0)   08/16/18  04:55    


 


Anion Gap  8.9  (7.0-16.0)   08/16/18  04:55    


 


BUN  10 mg/dL (7-25)   08/16/18  04:55    


 


Creatinine  0.7 mg/dL (0.7-1.3)   08/16/18  04:55    


 


Est GFR ( Amer)  > 60.0 ml/min (>90)   08/16/18  04:55    


 


Est GFR (Non-Af Amer)  > 60.0 ml/min  08/16/18  04:55    


 


BUN/Creatinine Ratio  14.3   08/16/18  04:55    


 


Glucose  120 mg/dL ()  H  08/16/18  04:55    


 


POC Glucose  137 MG/DL (70 - 105)  H  08/13/18  22:22    


 


Calcium  8.3 mg/dL (8.6-10.3)  L  08/16/18  04:55    


 


Total Bilirubin  0.3 mg/dL (0.3-1.0)   08/14/18  06:00    


 


AST  21 U/L (13-39)   08/14/18  06:00    


 


ALT  20 U/L (7-52)   08/14/18  06:00    


 


Alkaline Phosphatase  81 U/L ()   08/14/18  06:00    


 


Total Protein  6.4 gm/dL (6.0-8.3)   08/14/18  06:00    


 


Albumin  3.4 gm/dL (4.2-5.5)  L  08/14/18  06:00    


 


Globulin  3.0 gm/dL  08/14/18  06:00    


 


Albumin/Globulin Ratio  1.1  (1.0-1.8)   08/14/18  06:00    














- Physical Exam


Vitals and I&O: 


 Vital Signs











Temp  99.7 F   08/16/18 08:46


 


Pulse  111   08/16/18 08:46


 


Resp  18   08/16/18 08:46


 


BP  131/72   08/16/18 08:46


 


Pulse Ox  91   08/16/18 08:46








 Intake & Output











 08/15/18 08/16/18 08/16/18





 18:59 06:59 18:59


 


Intake Total 1400 410 


 


Output Total 0 1 


 


Balance 1400 409 


 


Weight (lbs) 60.328 kg 61.689 kg 


 


Intake:   


 


  Intake, IV Amount 1050 50 


 


    D5-0.9%Ns 1,000 ml @ 80 1000  





    mls/hr IV .[id number] [facility]   





    #:[id number]   


 


    Piperacillin Sodium/ 50 50 





    Tazobact 3.375 gm In   





    Sodium Chloride 0.9% 50   





    ml @ 100 mls/hr IV Q8HR   





    [facility] Rx#:[id number]   


 


  Oral 350 300 


 


  Other  60 


 


Output:   


 


  Stool 0 1 


 


Other:   


 


  # Voids 3 3 


 


  # Bowel Movements  1 


 


  Weight Source Bedscale Bedscale 











Active Medications: 


Current Medications





Albuterol/Ipratropium (Duoneb Neb)  3 ml HHN Q2H PRN


   PRN Reason: Wheezing


   Stop: 10/13/18 08:18


   Last Admin: 08/16/18 03:00 Dose:  3 ml


Amiodarone HCl (Cordarone)  200 mg PO DAILY [facility]


   Stop: 10/13/18 08:59


   Last Admin: 08/15/18 08:36 Dose:  Not Given


Cyanocobalamin (Vitamin B12)  1,000 mcg PO DAILY [facility]


   Stop: 10/13/18 08:59


   Last Admin: 08/15/18 08:36 Dose:  1,000 mcg


Diltiazem HCl (Cardizem)  60 mg PO DAILY [facility]


   Stop: 10/13/18 08:59


   Last Admin: 08/15/18 08:34 Dose:  Not Given


Divalproex Sodium (Depakote Dr)  500 mg PO BID [facility]; Protocol


   Stop: 10/13/18 08:59


   Last Admin: 08/15/18 16:48 Dose:  500 mg


Docusate Sodium (Colace)  100 mg PO BID PRN


   PRN Reason: CONSTIPATION


   Stop: 10/13/18 08:18


   Last Admin: 08/15/18 20:50 Dose:  100 mg


Gabapentin (Neurontin)  300 mg PO TID [facility]


   Stop: 10/13/18 08:59


   Last Admin: 08/15/18 20:50 Dose:  300 mg


Dextrose/Sodium Chloride (D5-0.9%Ns)  1,000 mls @ 80 mls/hr IV .[id number] [facility]


   Stop: 10/14/18 00:29


   Last Admin: 08/15/18 20:51 Dose:  80 mls/hr


Piperacillin Sod/Tazobactam (Sod 3.375 gm/ Sodium Chloride)  50 mls @ 100 mls/

hr IV Q8HR [facility]


   Stop: 10/13/18 20:59


   Last Admin: 08/16/18 04:47 Dose:  100 mls/hr


Lorazepam (Ativan)  2 mg IVP Q6H PRN; Protocol


   PRN Reason: Agitation


   Stop: 10/13/18 08:20


   Last Admin: 08/15/18 16:59 Dose:  2 mg


Magnesium Hydroxide (Milk Of Magnesia)  30 ml PO DAILY PRN


   PRN Reason: IF COLACE INEFFECTIVE


   Stop: 10/14/18 09:32


   Last Admin: 08/15/18 12:23 Dose:  30 ml


Mirtazapine (Remeron)  15 mg PO HS [facility]; Protocol


   Stop: 10/13/18 20:59


   Last Admin: 08/15/18 20:50 Dose:  15 mg


Olanzapine (Zyprexa)  5 mg PO DAILY [facility]; Protocol


   Stop: 10/13/18 08:59


   Last Admin: 08/15/18 08:35 Dose:  5 mg


Olanzapine (Zyprexa)  10 mg PO HS [facility]; Protocol


   Stop: 10/13/18 20:59


   Last Admin: 08/15/18 20:50 Dose:  10 mg


Ondansetron HCl (Zofran)  4 mg IV Q4H PRN


   PRN Reason: Nausea / Vomiting


   Stop: 10/12/18 23:06


   Last Admin: 08/15/18 00:34 Dose:  4 mg


Oxybutynin Chloride (Ditropan)  5 mg PO HS [facility]


   Stop: 10/13/18 20:59


   Last Admin: 08/15/18 21:00 Dose:  5 mg


Pantoprazole Sodium (Protonix)  40 mg PO BIDAC [facility]


   Stop: 10/13/18 08:59


   Last Admin: 08/15/18 16:48 Dose:  40 mg


Sodium Chloride (Nacl Tab)  1 gm PO BID [facility]


   Stop: 10/13/18 08:59


   Last Admin: 08/15/18 16:48 Dose:  1 gm


Sodium Phosphate (Fleet Enema)  135 ml RC DAILY PRN


   PRN Reason: IF MOM INEFFECTIVE


   Stop: 10/14/18 09:34


   Last Admin: 08/16/18 03:15 Dose:  135 ml


Tamsulosin HCl (Flomax)  0.4 mg PO DAILY [facility]


   Stop: 10/13/18 08:59


   Last Admin: 08/15/18 08:35 Dose:  0.4 mg








General: Alert, No acute distress


HEENT: Atraumatic


Neck: Supple


Cardiovascular: Regular rate


Lungs: Clear to auscultation


Abdomen: Bowel sounds, Soft, no Tender





Assessment/Plan





- Assessment


Assessment: 





IMPRESSION:


1. Concern for coffee ground emesis - LIKELY UGIB, NOW RESOLVED.


2. Dyspepsia


3. Psychosis


4. CONSTIPATION.





RECS:


- PPI BID


- Can hold off on EGD, not emergent; UNABLE TO GET CONSENT.


- ORAL DIET AS PATRICIA.


- MONITOR HGB.


- STOOL SOFTENERS.

## 2018-08-16 NOTE — INTERNAL MEDICINE PROG NOTE
Internal Medicine Subjective





- Subjective


Service Date: 08/16/18


Patient seen and examined:: without staff


Patient is:: awake, verbal, eyes closed, in bed, agitated, confused


Per staff patient has:: no adverse event





Internal Medicine Objective





- Results


Result Diagrams: 


 08/16/18 04:55





 08/16/18 04:55


Recent Labs: 


 Laboratory Last Values











WBC  8.2 Th/cmm (4.8-10.8)   08/16/18  04:55    


 


RBC  3.78 Mil/cmm (3.80-5.80)  L  08/16/18  04:55    


 


Hgb  11.8 gm/dL (12-16)  L  08/16/18  04:55    


 


Hct  35.4 % (41.0-60)  L  08/16/18  04:55    


 


MCV  93.8 fl (80-99)   08/16/18  04:55    


 


MCH  31.3 pg (27.0-31.0)  H  08/16/18  04:55    


 


MCHC Differential  33.4 pg (28.0-36.0)   08/16/18  04:55    


 


RDW  14.8 % (11.5-20.0)   08/16/18  04:55    


 


Plt Count  256 Th/cmm (150-400)   08/16/18  04:55    


 


MPV  7.2 fl  08/16/18  04:55    


 


Neutrophils %  79.1 % (40.0-80.0)   08/16/18  04:55    


 


Lymphocytes %  10.5 % (20.0-50.0)  L  08/16/18  04:55    


 


Monocytes %  7.8 % (2.0-10.0)   08/16/18  04:55    


 


Eosinophils %  1.9 % (0.0-5.0)   08/16/18  04:55    


 


Basophils %  0.7 % (0.0-2.0)   08/16/18  04:55    


 


Specimen Source  ARTERIAL   08/14/18  22:28    


 


Sample Site  Right Radial   08/14/18  22:28    


 


pH  7.38  (7.35-7.45)   08/14/18  22:28    


 


pCO2  45.0 mmHg (35.0-45.0)   08/14/18  22:28    


 


pO2  159.0 mmHg (80.0-100.0)  H  08/14/18  22:28    


 


HCO3  25.8 mEq/L (20.0-26.0)   08/14/18  22:28    


 


Base Excess  1.1 mEq/L (-3.0-3.0)   08/14/18  22:28    


 


O2 Saturation  99.0 % (92.0-100.0)   08/14/18  22:28    


 


Mata Test  Positive   08/14/18  22:28    


 


Vent Rate  N/A   08/14/18  22:28    


 


Inspired O2  100   08/14/18  22:28    


 


Tidal Volume  N/A   08/14/18  22:28    


 


PEEP  N/A   08/14/18  22:28    


 


Pressure (ins/psv/peep)  N/A   08/14/18  22:28    


 


Critical Value  MM,RCP   08/14/18  22:28    


 


Sodium  142 mEq/L (136-145)   08/16/18  04:55    


 


Potassium  3.2 mEq/L (3.5-5.1)  L  08/16/18  04:55    


 


Chloride  111 mEq/L ()  H  08/16/18  04:55    


 


Carbon Dioxide  25.3 mEq/L (21.0-31.0)   08/16/18  04:55    


 


Anion Gap  8.9  (7.0-16.0)   08/16/18  04:55    


 


BUN  10 mg/dL (7-25)   08/16/18  04:55    


 


Creatinine  0.7 mg/dL (0.7-1.3)   08/16/18  04:55    


 


Est GFR ( Amer)  > 60.0 ml/min (>90)   08/16/18  04:55    


 


Est GFR (Non-Af Amer)  > 60.0 ml/min  08/16/18  04:55    


 


BUN/Creatinine Ratio  14.3   08/16/18  04:55    


 


Glucose  120 mg/dL ()  H  08/16/18  04:55    


 


POC Glucose  137 MG/DL (70 - 105)  H  08/13/18  22:22    


 


Calcium  8.3 mg/dL (8.6-10.3)  L  08/16/18  04:55    


 


Total Bilirubin  0.3 mg/dL (0.3-1.0)   08/14/18  06:00    


 


AST  21 U/L (13-39)   08/14/18  06:00    


 


ALT  20 U/L (7-52)   08/14/18  06:00    


 


Alkaline Phosphatase  81 U/L ()   08/14/18  06:00    


 


Total Protein  6.4 gm/dL (6.0-8.3)   08/14/18  06:00    


 


Albumin  3.4 gm/dL (4.2-5.5)  L  08/14/18  06:00    


 


Globulin  3.0 gm/dL  08/14/18  06:00    


 


Albumin/Globulin Ratio  1.1  (1.0-1.8)   08/14/18  06:00    














- Physical Exam


Vitals and I&O: 


 Vital Signs











Temp  99.1 F   08/16/18 15:13


 


Pulse  98   08/16/18 15:13


 


Resp  18   08/16/18 15:13


 


BP  114/70   08/16/18 15:13


 


Pulse Ox  93   08/16/18 15:13








 Intake & Output











 08/16/18 08/16/18 08/17/18





 06:59 18:59 06:59


 


Intake Total 460  


 


Output Total 1  


 


Balance 459  


 


Weight (lbs) 61.689 kg  


 


Intake:   


 


  Intake, IV Amount 100  


 


    Piperacillin Sodium/ 100  





    Tazobact 3.375 gm In   





    Sodium Chloride 0.9% 50   





    ml @ 100 mls/hr IV Q8HR   





    [facility] Rx#:[id number]   


 


  Oral 300  


 


  Other 60  


 


Output:   


 


  Stool 1  


 


Other:   


 


  # Voids 3  


 


  # Bowel Movements 1  


 


  Stool Characteristics  Hard 


 


  Weight Source Bedscale  











Active Medications: 


Current Medications





Albuterol/Ipratropium (Duoneb Neb)  3 ml HHN Q2H PRN


   PRN Reason: Wheezing


   Stop: 10/13/18 08:18


   Last Admin: 08/16/18 03:00 Dose:  3 ml


Amiodarone HCl (Cordarone)  200 mg PO DAILY [facility]


   Stop: 10/13/18 08:59


   Last Admin: 08/16/18 09:30 Dose:  200 mg


Cyanocobalamin (Vitamin B12)  1,000 mcg PO DAILY [facility]


   Stop: 10/13/18 08:59


   Last Admin: 08/16/18 09:31 Dose:  Not Given


Diltiazem HCl (Cardizem)  60 mg PO DAILY [facility]


   Stop: 10/13/18 08:59


   Last Admin: 08/16/18 09:30 Dose:  60 mg


Divalproex Sodium (Depakote Dr)  500 mg PO BID [facility]; Protocol


   Stop: 10/13/18 08:59


   Last Admin: 08/16/18 16:43 Dose:  500 mg


Docusate Sodium (Colace)  100 mg PO BID PRN


   PRN Reason: CONSTIPATION


   Stop: 10/13/18 08:18


   Last Admin: 08/15/18 20:50 Dose:  100 mg


Gabapentin (Neurontin)  300 mg PO TID [facility]


   Stop: 10/13/18 08:59


   Last Admin: 08/16/18 14:37 Dose:  Not Given


Dextrose/Sodium Chloride (D5-0.9%Ns)  1,000 mls @ 80 mls/hr IV .[id number] [facility]


   Stop: 10/14/18 00:29


   Last Admin: 08/15/18 20:51 Dose:  80 mls/hr


Piperacillin Sod/Tazobactam (Sod 3.375 gm/ Sodium Chloride)  50 mls @ 100 mls/

hr IV Q8HR [facility]


   Stop: 10/13/18 20:59


   Last Admin: 08/16/18 12:13 Dose:  100 mls/hr


Lorazepam (Ativan)  2 mg IVP Q6H [facility]; Protocol


   PRN Reason: Agitation


   Stop: 10/13/18 08:20


   Last Admin: 08/15/18 16:59 Dose:  2 mg


Magnesium Hydroxide (Milk Of Magnesia)  30 ml PO DAILY PRN


   PRN Reason: IF COLACE INEFFECTIVE


   Stop: 10/14/18 09:32


   Last Admin: 08/16/18 10:26 Dose:  30 ml


Mirtazapine (Remeron)  15 mg PO HS [facility]; Protocol


   Stop: 10/13/18 20:59


   Last Admin: 08/15/18 20:50 Dose:  15 mg


Olanzapine (Zyprexa)  5 mg PO DAILY [facility]; Protocol


   Stop: 10/13/18 08:59


   Last Admin: 08/16/18 09:31 Dose:  5 mg


Olanzapine (Zyprexa)  10 mg PO HS [facility]; Protocol


   Stop: 10/13/18 20:59


   Last Admin: 08/15/18 20:50 Dose:  10 mg


Ondansetron HCl (Zofran)  4 mg IV Q4H PRN


   PRN Reason: Nausea / Vomiting


   Stop: 10/12/18 23:06


   Last Admin: 08/15/18 00:34 Dose:  4 mg


Oxybutynin Chloride (Ditropan)  5 mg PO HS [facility]


   Stop: 10/13/18 20:59


   Last Admin: 08/15/18 21:00 Dose:  5 mg


Pantoprazole Sodium (Protonix)  40 mg PO BIDAC [facility]


   Stop: 10/13/18 08:59


   Last Admin: 08/16/18 16:42 Dose:  40 mg


Sodium Chloride (Nacl Tab)  1 gm PO BID [facility]


   Stop: 10/13/18 08:59


   Last Admin: 08/16/18 16:43 Dose:  1 gm


Sodium Phosphate (Fleet Enema)  135 ml RC DAILY PRN


   PRN Reason: IF MOM INEFFECTIVE


   Stop: 10/14/18 09:34


   Last Admin: 08/16/18 03:15 Dose:  135 ml


Tamsulosin HCl (Flomax)  0.4 mg PO DAILY [facility]


   Stop: 10/13/18 08:59


   Last Admin: 08/16/18 09:31 Dose:  0.4 mg








General: weak, lethargic, congested, thin


HEENT: NC/AT, PERRLA, EOMI, anicteric sclerae, throat clear


Neck: Supple, No JVD, No thyromegaly, No LAD


Lungs: congested, wheezing, ronchi


Cardiovascular: RRR, Normal S1, Normal S2


Abdomen: soft


Extremities: clear


Neurological: no change





Internal Medicine Assmt/Plan





- Assessment


Assessment: 


Hypokalemia: supplemented today.





ALOC: multifactorial; observe closely.





s/p UGIB? H/H stable.





Psychosis: monitoring closely and adjust meds as needed.





PNA: due to aspiration?  IVPB ABX





COPD: RT protocol.





H/O A. Fib: rate controlled.





Noncompliance: education provided.





DVT prophylaxis.

## 2018-08-17 RX ADMIN — Medication SCH EACH: at 09:13

## 2018-08-17 RX ADMIN — Medication SCH TAB: at 09:13

## 2018-08-17 RX ADMIN — DILTIAZEM HYDROCHLORIDE SCH MG: 30 TABLET, FILM COATED ORAL at 09:13

## 2018-08-17 RX ADMIN — PANTOPRAZOLE SODIUM SCH MG: 40 TABLET, DELAYED RELEASE ORAL at 06:45

## 2018-08-17 RX ADMIN — DEXTROSE AND SODIUM CHLORIDE SCH MLS/HR: 5; .9 INJECTION, SOLUTION INTRAVENOUS at 16:07

## 2018-08-17 RX ADMIN — PANTOPRAZOLE SODIUM SCH MG: 40 TABLET, DELAYED RELEASE ORAL at 16:05

## 2018-08-17 RX ADMIN — DEXTROSE AND SODIUM CHLORIDE SCH MLS/HR: 5; .9 INJECTION, SOLUTION INTRAVENOUS at 05:05

## 2018-08-17 NOTE — DIAGNOSTIC IMAGING REPORT
Chest x-ray single view 



History: Pneumonia



Comparison: 8/14/2018



The heart is enlarged.  No focal pulmonary parenchymal processes. No

hilar or mediastinal abnormalities.



Impression: No acute abnormalities.

## 2018-08-17 NOTE — GI PROGRESS NOTE
Subjective





- Review of Systems


Service Date: 08/17/18


Subjective: 





PATRICIA ORAL DIET.


NO N/V OR GI BLEEDING.


NO ABD PAIN.





Objective





- Results


Result Diagrams: 


 08/16/18 04:55





 08/16/18 04:55


Recent Labs: 


 Laboratory Last Values











WBC  8.2 Th/cmm (4.8-10.8)   08/16/18  04:55    


 


RBC  3.78 Mil/cmm (3.80-5.80)  L  08/16/18  04:55    


 


Hgb  11.8 gm/dL (12-16)  L  08/16/18  04:55    


 


Hct  35.4 % (41.0-60)  L  08/16/18  04:55    


 


MCV  93.8 fl (80-99)   08/16/18  04:55    


 


MCH  31.3 pg (27.0-31.0)  H  08/16/18  04:55    


 


MCHC Differential  33.4 pg (28.0-36.0)   08/16/18  04:55    


 


RDW  14.8 % (11.5-20.0)   08/16/18  04:55    


 


Plt Count  256 Th/cmm (150-400)   08/16/18  04:55    


 


MPV  7.2 fl  08/16/18  04:55    


 


Neutrophils %  79.1 % (40.0-80.0)   08/16/18  04:55    


 


Lymphocytes %  10.5 % (20.0-50.0)  L  08/16/18  04:55    


 


Monocytes %  7.8 % (2.0-10.0)   08/16/18  04:55    


 


Eosinophils %  1.9 % (0.0-5.0)   08/16/18  04:55    


 


Basophils %  0.7 % (0.0-2.0)   08/16/18  04:55    


 


Specimen Source  ARTERIAL   08/14/18  22:28    


 


Sample Site  Right Radial   08/14/18  22:28    


 


pH  7.38  (7.35-7.45)   08/14/18  22:28    


 


pCO2  45.0 mmHg (35.0-45.0)   08/14/18  22:28    


 


pO2  159.0 mmHg (80.0-100.0)  H  08/14/18  22:28    


 


HCO3  25.8 mEq/L (20.0-26.0)   08/14/18  22:28    


 


Base Excess  1.1 mEq/L (-3.0-3.0)   08/14/18  22:28    


 


O2 Saturation  99.0 % (92.0-100.0)   08/14/18  22:28    


 


Mata Test  Positive   08/14/18  22:28    


 


Vent Rate  N/A   08/14/18  22:28    


 


Inspired O2  100   08/14/18  22:28    


 


Tidal Volume  N/A   08/14/18  22:28    


 


PEEP  N/A   08/14/18  22:28    


 


Pressure (ins/psv/peep)  N/A   08/14/18  22:28    


 


Critical Value  MM,RCP   08/14/18  22:28    


 


Sodium  142 mEq/L (136-145)   08/16/18  04:55    


 


Potassium  3.2 mEq/L (3.5-5.1)  L  08/16/18  04:55    


 


Chloride  111 mEq/L ()  H  08/16/18  04:55    


 


Carbon Dioxide  25.3 mEq/L (21.0-31.0)   08/16/18  04:55    


 


Anion Gap  8.9  (7.0-16.0)   08/16/18  04:55    


 


BUN  10 mg/dL (7-25)   08/16/18  04:55    


 


Creatinine  0.7 mg/dL (0.7-1.3)   08/16/18  04:55    


 


Est GFR ( Amer)  > 60.0 ml/min (>90)   08/16/18  04:55    


 


Est GFR (Non-Af Amer)  > 60.0 ml/min  08/16/18  04:55    


 


BUN/Creatinine Ratio  14.3   08/16/18  04:55    


 


Glucose  120 mg/dL ()  H  08/16/18  04:55    


 


POC Glucose  137 MG/DL (70 - 105)  H  08/13/18  22:22    


 


Calcium  8.3 mg/dL (8.6-10.3)  L  08/16/18  04:55    


 


Total Bilirubin  0.3 mg/dL (0.3-1.0)   08/14/18  06:00    


 


AST  21 U/L (13-39)   08/14/18  06:00    


 


ALT  20 U/L (7-52)   08/14/18  06:00    


 


Alkaline Phosphatase  81 U/L ()   08/14/18  06:00    


 


Total Protein  6.4 gm/dL (6.0-8.3)   08/14/18  06:00    


 


Albumin  3.4 gm/dL (4.2-5.5)  L  08/14/18  06:00    


 


Globulin  3.0 gm/dL  08/14/18  06:00    


 


Albumin/Globulin Ratio  1.1  (1.0-1.8)   08/14/18  06:00    














- Physical Exam


Vitals and I&O: 


 Vital Signs











Temp  97.9 F   08/17/18 08:00


 


Pulse  90   08/17/18 09:13


 


Resp  18   08/17/18 08:00


 


BP  123/69   08/17/18 08:00


 


Pulse Ox  97   08/17/18 08:00








 Intake & Output











 08/16/18 08/17/18 08/17/18





 18:59 06:59 18:59


 


Intake Total 1650 1020 


 


Output Total  2 


 


Balance 1650 1018 


 


Weight (lbs) 61.689 kg 61.689 kg 


 


Intake:   


 


  Intake, IV Amount 1050  


 


    D5-0.9%Ns 1,000 ml @ 80 1000  





    mls/hr IV .[id number] [facility] Rx   





    #:[id number]   


 


    Piperacillin Sodium/ 50  





    Tazobact 3.375 gm In   





    Sodium Chloride 0.9% 50   





    ml @ 100 mls/hr IV Q8HR   





    [facility] Rx#:[id number]   


 


  Oral 600 1000 


 


  Other  20 


 


Output:   


 


  Stool  2 


 


Other:   


 


  # Voids 4 4 


 


  # Bowel Movements 1 2 


 


  Stool Characteristics Hard Soft Soft


 


  Weight Source Bedscale Bedscale 











Active Medications: 


Current Medications





Albuterol/Ipratropium (Duoneb Neb)  3 ml HHN Q2H PRN


   PRN Reason: Wheezing


   Stop: 10/13/18 08:18


   Last Admin: 08/16/18 03:00 Dose:  3 ml


Amiodarone HCl (Cordarone)  200 mg PO DAILY [facility]


   Stop: 10/13/18 08:59


   Last Admin: 08/17/18 09:13 Dose:  200 mg


Cyanocobalamin (Vitamin B12)  1,000 mcg PO DAILY [facility]


   Stop: 10/13/18 08:59


   Last Admin: 08/17/18 09:13 Dose:  1,000 mcg


Diltiazem HCl (Cardizem)  60 mg PO DAILY [facility]


   Stop: 10/13/18 08:59


   Last Admin: 08/17/18 09:13 Dose:  60 mg


Divalproex Sodium (Depakote Dr)  500 mg PO BID [facility]; Protocol


   Stop: 10/13/18 08:59


   Last Admin: 08/17/18 09:13 Dose:  500 mg


Docusate Sodium (Colace)  100 mg PO BID PRN


   PRN Reason: CONSTIPATION


   Stop: 10/13/18 08:18


   Last Admin: 08/16/18 21:22 Dose:  100 mg


Gabapentin (Neurontin)  300 mg PO TID [facility]


   Stop: 10/13/18 08:59


   Last Admin: 08/17/18 09:13 Dose:  300 mg


Dextrose/Sodium Chloride (D5-0.9%Ns)  1,000 mls @ 80 mls/hr IV .[id number] [facility]


   Stop: 10/14/18 00:29


   Last Admin: 08/17/18 05:05 Dose:  80 mls/hr


Piperacillin Sod/Tazobactam (Sod 3.375 gm/ Sodium Chloride)  50 mls @ 100 mls/

hr IV Q8HR [facility]


   Stop: 10/13/18 20:59


   Last Admin: 08/17/18 04:56 Dose:  100 mls/hr


Lactobacillus Rhamnosus (Culturelle 15b)  1 each PO DAILY [facility]


   Stop: 10/16/18 08:59


   Last Admin: 08/17/18 09:13 Dose:  1 each


Lorazepam (Ativan)  2 mg IVP Q6H PRN; Protocol


   PRN Reason: Agitation


   Stop: 10/13/18 08:20


   Last Admin: 08/15/18 16:59 Dose:  2 mg


Magnesium Hydroxide (Milk Of Magnesia)  30 ml PO DAILY PRN


   PRN Reason: IF COLACE INEFFECTIVE


   Stop: 10/14/18 09:32


   Last Admin: 08/16/18 10:26 Dose:  30 ml


Mirtazapine (Remeron)  15 mg PO HS [facility]; Protocol


   Stop: 10/13/18 20:59


   Last Admin: 08/16/18 21:23 Dose:  15 mg


Miscellaneous (Probiotic Screen)  1 ea MC PRN PRN


   PRN Reason: PROTOCOL


   Stop: 10/16/18 08:29


Olanzapine (Zyprexa)  5 mg PO DAILY [facility]; Protocol


   Stop: 10/13/18 08:59


   Last Admin: 08/17/18 09:13 Dose:  5 mg


Olanzapine (Zyprexa)  10 mg PO HS [facility]; Protocol


   Stop: 10/13/18 20:59


   Last Admin: 08/16/18 21:22 Dose:  10 mg


Ondansetron HCl (Zofran)  4 mg IV Q4H PRN


   PRN Reason: Nausea / Vomiting


   Stop: 10/12/18 23:06


   Last Admin: 08/15/18 00:34 Dose:  4 mg


Oxybutynin Chloride (Ditropan)  5 mg PO HS [facility]


   Stop: 10/13/18 20:59


   Last Admin: 08/16/18 21:23 Dose:  5 mg


Pantoprazole Sodium (Protonix)  40 mg PO BIDAC [facility]


   Stop: 10/13/18 08:59


   Last Admin: 08/17/18 06:45 Dose:  40 mg


Sodium Chloride (Nacl Tab)  1 gm PO BID [facility]


   Stop: 10/13/18 08:59


   Last Admin: 08/17/18 09:12 Dose:  1 gm


Sodium Phosphate (Fleet Enema)  135 ml RC DAILY PRN


   PRN Reason: IF MOM INEFFECTIVE


   Stop: 10/14/18 09:34


   Last Admin: 08/16/18 03:15 Dose:  135 ml


Tamsulosin HCl (Flomax)  0.4 mg PO DAILY [facility]


   Stop: 10/13/18 08:59


   Last Admin: 08/17/18 09:13 Dose:  0.4 mg








General: Alert, No acute distress


HEENT: Atraumatic


Neck: Supple


Cardiovascular: Regular rate


Lungs: Clear to auscultation


Abdomen: Bowel sounds, Soft, no Tender





Assessment/Plan





- Assessment


Assessment: 





IMPRESSION:


1. Concern for coffee ground emesis - LIKELY UGIB, NOW RESOLVED.


2. Dyspepsia


3. Psychosis


4. CONSTIPATION.





RECS:


- PPI BID


- Can hold off on EGD, not emergent; UNABLE TO GET CONSENT.


- ORAL DIET AS PATRICIA.


- MONITOR HGB.


- STOOL SOFTENERS.

## 2018-08-18 LAB
ALBUMIN SERPL-MCNC: 3.3 GM/DL (ref 4.2–5.5)
ALBUMIN/GLOB SERPL: 1.2 {RATIO} (ref 1–1.8)
ALP SERPL-CCNC: 66 U/L (ref 34–104)
ALT SERPL-CCNC: 17 U/L (ref 7–52)
ANION GAP SERPL CALC-SCNC: 12.2 MMOL/L (ref 7–16)
AST SERPL-CCNC: 14 U/L (ref 13–39)
BASOPHILS # BLD AUTO: 0 TH/CUMM (ref 0–0.2)
BASOPHILS NFR BLD AUTO: 0 % (ref 0–2)
BILIRUB SERPL-MCNC: 0.3 MG/DL (ref 0.3–1)
BUN SERPL-MCNC: 6 MG/DL (ref 7–25)
CALCIUM SERPL-MCNC: 8.4 MG/DL (ref 8.6–10.3)
CHLORIDE SERPL-SCNC: 108 MEQ/L (ref 98–107)
CO2 SERPL-SCNC: 21.3 MEQ/L (ref 21–31)
CREAT SERPL-MCNC: 0.6 MG/DL (ref 0.7–1.3)
EOSINOPHIL # BLD AUTO: 0.2 TH/CMM (ref 0.1–0.4)
EOSINOPHIL NFR BLD AUTO: 1.5 % (ref 0–5)
ERYTHROCYTE [DISTWIDTH] IN BLOOD BY AUTOMATED COUNT: 15 % (ref 11.5–20)
GLOBULIN SER-MCNC: 2.8 GM/DL
GLUCOSE SERPL-MCNC: 161 MG/DL (ref 70–105)
HCT VFR BLD CALC: 35.9 % (ref 41–60)
HGB BLD-MCNC: 12 GM/DL (ref 12–16)
LYMPHOCYTE AB SER FC-ACNC: 1.4 TH/CMM (ref 1.5–3)
LYMPHOCYTES NFR BLD AUTO: 13.8 % (ref 20–50)
MCH RBC QN AUTO: 31.3 PG (ref 27–31)
MCHC RBC AUTO-ENTMCNC: 33.3 PG (ref 28–36)
MCV RBC AUTO: 94.2 FL (ref 80–99)
MONOCYTES # BLD AUTO: 1.1 TH/CMM (ref 0.3–1)
MONOCYTES NFR BLD AUTO: 10.2 % (ref 2–10)
NEUTROPHILS # BLD: 7.6 TH/CMM (ref 1.8–8)
NEUTROPHILS NFR BLD AUTO: 74.5 % (ref 40–80)
PLATELET # BLD: 142 TH/CMM (ref 150–400)
PMV BLD AUTO: 7.8 FL
POTASSIUM SERPL-SCNC: 3.5 MEQ/L (ref 3.5–5.1)
RBC # BLD AUTO: 3.82 MIL/CMM (ref 3.8–5.8)
SODIUM SERPL-SCNC: 138 MEQ/L (ref 136–145)
WBC # BLD AUTO: 10.3 TH/CMM (ref 4.8–10.8)

## 2018-08-18 RX ADMIN — PANTOPRAZOLE SODIUM SCH MG: 40 TABLET, DELAYED RELEASE ORAL at 16:01

## 2018-08-18 RX ADMIN — DEXTROSE AND SODIUM CHLORIDE SCH MLS/HR: 5; .9 INJECTION, SOLUTION INTRAVENOUS at 05:31

## 2018-08-18 RX ADMIN — DILTIAZEM HYDROCHLORIDE SCH MG: 30 TABLET, FILM COATED ORAL at 08:31

## 2018-08-18 RX ADMIN — PANTOPRAZOLE SODIUM SCH MG: 40 TABLET, DELAYED RELEASE ORAL at 08:31

## 2018-08-18 RX ADMIN — Medication SCH TAB: at 08:31

## 2018-08-18 RX ADMIN — Medication SCH EACH: at 08:30

## 2018-08-18 RX ADMIN — DEXTROSE AND SODIUM CHLORIDE SCH MLS/HR: 5; .9 INJECTION, SOLUTION INTRAVENOUS at 12:02

## 2018-08-18 NOTE — GI PROGRESS NOTE
Subjective





- Review of Systems


Service Date: 08/18/18


Subjective: 





PATRICIA ORAL DIET.


NO N/V OR GI BLEEDING.


NO ABD PAIN.





Objective





- Results


Result Diagrams: 


 08/16/18 04:55





 08/16/18 04:55


Recent Labs: 


 Laboratory Last Values











WBC  8.2 Th/cmm (4.8-10.8)   08/16/18  04:55    


 


RBC  3.78 Mil/cmm (3.80-5.80)  L  08/16/18  04:55    


 


Hgb  11.8 gm/dL (12-16)  L  08/16/18  04:55    


 


Hct  35.4 % (41.0-60)  L  08/16/18  04:55    


 


MCV  93.8 fl (80-99)   08/16/18  04:55    


 


MCH  31.3 pg (27.0-31.0)  H  08/16/18  04:55    


 


MCHC Differential  33.4 pg (28.0-36.0)   08/16/18  04:55    


 


RDW  14.8 % (11.5-20.0)   08/16/18  04:55    


 


Plt Count  256 Th/cmm (150-400)   08/16/18  04:55    


 


MPV  7.2 fl  08/16/18  04:55    


 


Neutrophils %  79.1 % (40.0-80.0)   08/16/18  04:55    


 


Lymphocytes %  10.5 % (20.0-50.0)  L  08/16/18  04:55    


 


Monocytes %  7.8 % (2.0-10.0)   08/16/18  04:55    


 


Eosinophils %  1.9 % (0.0-5.0)   08/16/18  04:55    


 


Basophils %  0.7 % (0.0-2.0)   08/16/18  04:55    


 


Specimen Source  ARTERIAL   08/14/18  22:28    


 


Sample Site  Right Radial   08/14/18  22:28    


 


pH  7.38  (7.35-7.45)   08/14/18  22:28    


 


pCO2  45.0 mmHg (35.0-45.0)   08/14/18  22:28    


 


pO2  159.0 mmHg (80.0-100.0)  H  08/14/18  22:28    


 


HCO3  25.8 mEq/L (20.0-26.0)   08/14/18  22:28    


 


Base Excess  1.1 mEq/L (-3.0-3.0)   08/14/18  22:28    


 


O2 Saturation  99.0 % (92.0-100.0)   08/14/18  22:28    


 


Mata Test  Positive   08/14/18  22:28    


 


Vent Rate  N/A   08/14/18  22:28    


 


Inspired O2  100   08/14/18  22:28    


 


Tidal Volume  N/A   08/14/18  22:28    


 


PEEP  N/A   08/14/18  22:28    


 


Pressure (ins/psv/peep)  N/A   08/14/18  22:28    


 


Critical Value  MM,RCP   08/14/18  22:28    


 


Sodium  142 mEq/L (136-145)   08/16/18  04:55    


 


Potassium  3.2 mEq/L (3.5-5.1)  L  08/16/18  04:55    


 


Chloride  111 mEq/L ()  H  08/16/18  04:55    


 


Carbon Dioxide  25.3 mEq/L (21.0-31.0)   08/16/18  04:55    


 


Anion Gap  8.9  (7.0-16.0)   08/16/18  04:55    


 


BUN  10 mg/dL (7-25)   08/16/18  04:55    


 


Creatinine  0.7 mg/dL (0.7-1.3)   08/16/18  04:55    


 


Est GFR ( Amer)  > 60.0 ml/min (>90)   08/16/18  04:55    


 


Est GFR (Non-Af Amer)  > 60.0 ml/min  08/16/18  04:55    


 


BUN/Creatinine Ratio  14.3   08/16/18  04:55    


 


Glucose  120 mg/dL ()  H  08/16/18  04:55    


 


POC Glucose  137 MG/DL (70 - 105)  H  08/13/18  22:22    


 


Calcium  8.3 mg/dL (8.6-10.3)  L  08/16/18  04:55    


 


Total Bilirubin  0.3 mg/dL (0.3-1.0)   08/14/18  06:00    


 


AST  21 U/L (13-39)   08/14/18  06:00    


 


ALT  20 U/L (7-52)   08/14/18  06:00    


 


Alkaline Phosphatase  81 U/L ()   08/14/18  06:00    


 


Total Protein  6.4 gm/dL (6.0-8.3)   08/14/18  06:00    


 


Albumin  3.4 gm/dL (4.2-5.5)  L  08/14/18  06:00    


 


Globulin  3.0 gm/dL  08/14/18  06:00    


 


Albumin/Globulin Ratio  1.1  (1.0-1.8)   08/14/18  06:00    














- Physical Exam


Vitals and I&O: 


 Vital Signs











Temp  97.1 F   08/18/18 07:57


 


Pulse  88   08/18/18 08:31


 


Resp  18   08/18/18 07:57


 


BP  131/78   08/18/18 07:57


 


Pulse Ox  98   08/18/18 07:57








 Intake & Output











 08/17/18 08/18/18 08/18/18





 18:59 06:59 18:59


 


Intake Total 4648.669 7378 100


 


Balance 3321.455 9189 100


 


Weight (lbs) 62.596 kg  61.779 kg


 


Intake:   


 


  Intake, IV Amount 471.705 0867 


 


    D5-0.9%Ns 1,000 ml @ 80 802.919 7752 





    mls/hr IV .[id number] [facility] Rx   





    #:[id number]   


 


    Piperacillin Sodium/ 50 50 





    Tazobact 3.375 gm In   





    Sodium Chloride 0.9% 50   





    ml @ 100 mls/hr IV Q8HR   





    [facility] Rx#:[id number]   


 


  Oral 500  100


 


Other:   


 


  # Voids 5  3


 


  # Bowel Movements 2  


 


  Stool Characteristics Soft Soft 


 


  Weight Source Bedscale  Bedscale











Active Medications: 


Current Medications





Albuterol/Ipratropium (Duoneb Neb)  3 ml HHN Q2H PRN


   PRN Reason: Wheezing


   Stop: 10/13/18 08:18


   Last Admin: 08/16/18 03:00 Dose:  3 ml


Amiodarone HCl (Cordarone)  200 mg PO DAILY [facility]


   Stop: 10/13/18 08:59


   Last Admin: 08/18/18 08:30 Dose:  200 mg


Cyanocobalamin (Vitamin B12)  1,000 mcg PO DAILY [facility]


   Stop: 10/13/18 08:59


   Last Admin: 08/18/18 08:31 Dose:  1,000 mcg


Diltiazem HCl (Cardizem)  60 mg PO DAILY [facility]


   Stop: 10/13/18 08:59


   Last Admin: 08/18/18 08:31 Dose:  60 mg


Divalproex Sodium (Depakote Dr)  500 mg PO BID [facility]; Protocol


   Stop: 10/13/18 08:59


   Last Admin: 08/18/18 08:31 Dose:  500 mg


Docusate Sodium (Colace)  100 mg PO BID PRN


   PRN Reason: CONSTIPATION


   Stop: 10/13/18 08:18


   Last Admin: 08/16/18 21:22 Dose:  100 mg


Gabapentin (Neurontin)  300 mg PO TID [facility]


   Stop: 10/13/18 08:59


   Last Admin: 08/18/18 08:31 Dose:  300 mg


Dextrose/Sodium Chloride (D5-0.9%Ns)  1,000 mls @ 80 mls/hr IV .[id number] [facility]


   Stop: 10/14/18 00:29


   Last Admin: 08/18/18 05:31 Dose:  80 mls/hr


Piperacillin Sod/Tazobactam (Sod 3.375 gm/ Sodium Chloride)  50 mls @ 100 mls/

hr IV Q8HR [facility]


   Stop: 10/13/18 20:59


   Last Admin: 08/18/18 05:28 Dose:  100 mls/hr


Lactobacillus Rhamnosus (Culturelle 15b)  1 each PO DAILY [facility]


   Stop: 10/16/18 08:59


   Last Admin: 08/18/18 08:30 Dose:  1 each


Lorazepam (Ativan)  2 mg IVP Q6H [facility]; Protocol


   PRN Reason: Agitation


   Stop: 10/13/18 08:20


   Last Admin: 08/15/18 16:59 Dose:  2 mg


Magnesium Hydroxide (Milk Of Magnesia)  30 ml PO DAILY PRN


   PRN Reason: IF COLACE INEFFECTIVE


   Stop: 10/14/18 09:32


   Last Admin: 08/16/18 10:26 Dose:  30 ml


Mirtazapine (Remeron)  15 mg PO HS [facility]; Protocol


   Stop: 10/13/18 20:59


   Last Admin: 08/17/18 20:05 Dose:  15 mg


Miscellaneous (Probiotic Screen)  1 ea MC PRN PRN


   PRN Reason: PROTOCOL


   Stop: 10/16/18 08:29


Olanzapine (Zyprexa)  5 mg PO DAILY [facility]; Protocol


   Stop: 10/13/18 08:59


   Last Admin: 08/18/18 08:30 Dose:  5 mg


Olanzapine (Zyprexa)  10 mg PO HS [facility]; Protocol


   Stop: 10/13/18 20:59


   Last Admin: 08/17/18 20:05 Dose:  10 mg


Ondansetron HCl (Zofran)  4 mg IV Q4H PRN


   PRN Reason: Nausea / Vomiting


   Stop: 10/12/18 23:06


   Last Admin: 08/15/18 00:34 Dose:  4 mg


Oxybutynin Chloride (Ditropan)  5 mg PO HS [facility]


   Stop: 10/13/18 20:59


   Last Admin: 08/17/18 20:05 Dose:  5 mg


Pantoprazole Sodium (Protonix)  40 mg PO BIDAC [facility]


   Stop: 10/13/18 08:59


   Last Admin: 08/18/18 08:31 Dose:  40 mg


Sodium Chloride (Nacl Tab)  1 gm PO BID [facility]


   Stop: 10/13/18 08:59


   Last Admin: 08/18/18 08:30 Dose:  1 gm


Sodium Phosphate (Fleet Enema)  135 ml RC DAILY PRN


   PRN Reason: IF MOM INEFFECTIVE


   Stop: 10/14/18 09:34


   Last Admin: 08/16/18 03:15 Dose:  135 ml


Tamsulosin HCl (Flomax)  0.4 mg PO DAILY [facility]


   Stop: 10/13/18 08:59


   Last Admin: 08/18/18 08:31 Dose:  0.4 mg








General: No acute distress


HEENT: Atraumatic


Neck: Supple


Cardiovascular: Regular rate


Lungs: Clear to auscultation


Abdomen: Bowel sounds, Soft, no Tender





Assessment/Plan





- Assessment


Assessment: 





IMPRESSION:


1. Concern for coffee ground emesis - LIKELY UGIB, NOW RESOLVED.


2. Dyspepsia


3. Psychosis


4. CONSTIPATION.





RECS:


- PPI BID


- Can hold off on EGD, not emergent; UNABLE TO GET CONSENT.


- ORAL DIET AS PATRICIA.


- MONITOR HGB.


- STOOL SOFTENERS.





GI WISE STABLE.

## 2018-08-18 NOTE — INTERNAL MEDICINE PROG NOTE
Internal Medicine Subjective





- Subjective


Service Date: 18


Patient seen and examined:: with staff


Patient is:: awake, verbal, eyes closed, in bed, agitated, confused


Per staff patient has:: no adverse event





Internal Medicine Objective





- Results


Result Diagrams: 


 18 08:44





 18 08:44


Recent Labs: 


 Laboratory Last Values











WBC  10.3 Th/cmm (4.8-10.8)   18  08:44    


 


RBC  3.82 Mil/cmm (3.80-5.80)   18  08:44    


 


Hgb  12.0 gm/dL (12-16)   18  08:44    


 


Hct  35.9 % (41.0-60)  L  18  08:44    


 


MCV  94.2 fl (80-99)   18  08:44    


 


MCH  31.3 pg (27.0-31.0)  H  18  08:44    


 


MCHC Differential  33.3 pg (28.0-36.0)   18  08:44    


 


RDW  15.0 % (11.5-20.0)   18  08:44    


 


Plt Count  142 Th/cmm (150-400)  L  18  08:44    


 


MPV  7.8 fl  18  08:44    


 


Neutrophils %  74.5 % (40.0-80.0)   18  08:44    


 


Lymphocytes %  13.8 % (20.0-50.0)  L  18  08:44    


 


Monocytes %  10.2 % (2.0-10.0)  H  18  08:44    


 


Eosinophils %  1.5 % (0.0-5.0)   18  08:44    


 


Basophils %  0.0 % (0.0-2.0)   18  08:44    


 


Specimen Source  ARTERIAL   18  22:28    


 


Sample Site  Right Radial   18  22:28    


 


pH  7.38  (7.35-7.45)   18  22:28    


 


pCO2  45.0 mmHg (35.0-45.0)   18  22:28    


 


pO2  159.0 mmHg (80.0-100.0)  H  18  22:28    


 


HCO3  25.8 mEq/L (20.0-26.0)   18  22:28    


 


Base Excess  1.1 mEq/L (-3.0-3.0)   18  22:28    


 


O2 Saturation  99.0 % (92.0-100.0)   18  22:28    


 


Mata Test  Positive   18  22:28    


 


Vent Rate  N/A   18  22:28    


 


Inspired O2  100   18  22:28    


 


Tidal Volume  N/A   18  22:28    


 


PEEP  N/A   18  22:28    


 


Pressure (ins/psv/peep)  N/A   18  22:28    


 


Critical Value  MM,RCP   18  22:28    


 


Sodium  138 mEq/L (136-145)   18  08:44    


 


Potassium  3.5 mEq/L (3.5-5.1)   18  08:44    


 


Chloride  108 mEq/L ()  H  18  08:44    


 


Carbon Dioxide  21.3 mEq/L (21.0-31.0)   18  08:44    


 


Anion Gap  12.2  (7.0-16.0)   18  08:44    


 


BUN  6 mg/dL (7-25)  L  18  08:44    


 


Creatinine  0.6 mg/dL (0.7-1.3)  L  18  08:44    


 


Est GFR ( Amer)  > 60.0 ml/min (>90)   18  08:44    


 


Est GFR (Non-Af Amer)  > 60.0 ml/min  18  08:44    


 


BUN/Creatinine Ratio  10.0   18  08:44    


 


Glucose  161 mg/dL ()  H  18  08:44    


 


POC Glucose  137 MG/DL (70 - 105)  H  18  22:22    


 


Calcium  8.4 mg/dL (8.6-10.3)  L  18  08:44    


 


Total Bilirubin  0.3 mg/dL (0.3-1.0)   18  08:44    


 


AST  14 U/L (13-39)   18  08:44    


 


ALT  17 U/L (7-52)   18  08:44    


 


Alkaline Phosphatase  66 U/L ()   18  08:44    


 


Total Protein  6.1 gm/dL (6.0-8.3)   18  08:44    


 


Albumin  3.3 gm/dL (4.2-5.5)  L  18  08:44    


 


Globulin  2.8 gm/dL  18  08:44    


 


Albumin/Globulin Ratio  1.2  (1.0-1.8)   18  08:44    














- Physical Exam


Vitals and I&O: 


 Vital Signs











Temp  97.1 F   18 07:57


 


Pulse  88   18 08:31


 


Resp  18   18 08:00


 


BP  131/78   18 07:57


 


Pulse Ox  98   18 07:57








 Intake & Output











 18





 18:59 06:59 18:59


 


Intake Total 0910.288 4148 100


 


Balance 1255.264 9472 100


 


Weight (lbs) 62.596 kg  61.779 kg


 


Intake:   


 


  Intake, IV Amount 522.885 4470 


 


    D5-0.9%Ns 1,000 ml @ 80 581.681 1134 





    mls/hr IV .[id number] [facility] Rx   





    #:[id number]   


 


    Piperacillin Sodium/ 50 50 





    Tazobact 3.375 gm In   





    Sodium Chloride 0.9% 50   





    ml @ 100 mls/hr IV Q8HR   





    [facility] Rx#:[id number]   


 


  Oral 500  100


 


Other:   


 


  # Voids 5  3


 


  # Bowel Movements 2  


 


  Stool Characteristics Soft Soft Soft


 


  Weight Source Bedscale  Bedscale











Active Medications: 


Current Medications





Albuterol/Ipratropium (Duoneb Neb)  3 ml HHN Q2H PRN


   PRN Reason: Wheezing


   Stop: 10/13/18 08:18


   Last Admin: 18 03:00 Dose:  3 ml


Amiodarone HCl (Cordarone)  200 mg PO DAILY [facility]


   Stop: 10/13/18 08:59


   Last Admin: 18 08:30 Dose:  200 mg


Cyanocobalamin (Vitamin B12)  1,000 mcg PO DAILY [facility]


   Stop: 10/13/18 08:59


   Last Admin: 18 08:31 Dose:  1,000 mcg


Diltiazem HCl (Cardizem)  60 mg PO DAILY [facility]


   Stop: 10/13/18 08:59


   Last Admin: 18 08:31 Dose:  60 mg


Divalproex Sodium (Depakote Dr)  500 mg PO BID [facility]; Protocol


   Stop: 10/13/18 08:59


   Last Admin: 18 08:31 Dose:  500 mg


Docusate Sodium (Colace)  100 mg PO BID PRN


   PRN Reason: CONSTIPATION


   Stop: 10/13/18 08:18


   Last Admin: 18 21:22 Dose:  100 mg


Gabapentin (Neurontin)  300 mg PO TID [facility]


   Stop: 10/13/18 08:59


   Last Admin: 18 08:31 Dose:  300 mg


Dextrose/Sodium Chloride (D5-0.9%Ns)  1,000 mls @ 80 mls/hr IV .[id number] [facility]


   Stop: 10/14/18 00:29


   Last Admin: 18 05:31 Dose:  80 mls/hr


Piperacillin Sod/Tazobactam (Sod 3.375 gm/ Sodium Chloride)  50 mls @ 100 mls/

hr IV Q8HR [facility]


   Stop: 10/13/18 20:59


   Last Admin: 18 05:28 Dose:  100 mls/hr


Lactobacillus Rhamnosus (Culturelle 15b)  1 each PO DAILY [facility]


   Stop: 10/16/18 08:59


   Last Admin: 18 08:30 Dose:  1 each


Lorazepam (Ativan)  2 mg IVP Q6H [facility]; Protocol


   PRN Reason: Agitation


   Stop: 10/13/18 08:20


   Last Admin: 08/15/18 16:59 Dose:  2 mg


Magnesium Hydroxide (Milk Of Magnesia)  30 ml PO DAILY PRN


   PRN Reason: IF COLACE INEFFECTIVE


   Stop: 10/14/18 09:32


   Last Admin: 18 10:26 Dose:  30 ml


Mirtazapine (Remeron)  15 mg PO HS [facility]; Protocol


   Stop: 10/13/18 20:59


   Last Admin: 18 20:05 Dose:  15 mg


Miscellaneous (Probiotic Screen)  1 ea MC PRN PRN


   PRN Reason: PROTOCOL


   Stop: 10/16/18 08:29


Olanzapine (Zyprexa)  5 mg PO DAILY [facility]; Protocol


   Stop: 10/13/18 08:59


   Last Admin: 18 08:30 Dose:  5 mg


Olanzapine (Zyprexa)  10 mg PO HS [facility]; Protocol


   Stop: 10/13/18 20:59


   Last Admin: 18 20:05 Dose:  10 mg


Ondansetron HCl (Zofran)  4 mg IV Q4H PRN


   PRN Reason: Nausea / Vomiting


   Stop: 10/12/18 23:06


   Last Admin: 08/15/18 00:34 Dose:  4 mg


Oxybutynin Chloride (Ditropan)  5 mg PO HS [facility]


   Stop: 10/13/18 20:59


   Last Admin: 18 20:05 Dose:  5 mg


Pantoprazole Sodium (Protonix)  40 mg PO BIDAC [facility]


   Stop: 10/13/18 08:59


   Last Admin: 18 08:31 Dose:  40 mg


Sodium Chloride (Nacl Tab)  1 gm PO BID [facility]


   Stop: 10/13/18 08:59


   Last Admin: 18 08:30 Dose:  1 gm


Sodium Phosphate (Fleet Enema)  135 ml RC DAILY PRN


   PRN Reason: IF MOM INEFFECTIVE


   Stop: 10/14/18 09:34


   Last Admin: 18 03:15 Dose:  135 ml


Tamsulosin HCl (Flomax)  0.4 mg PO DAILY [facility]


   Stop: 10/13/18 08:59


   Last Admin: 18 08:31 Dose:  0.4 mg








General: weak, lethargic, congested, thin


HEENT: NC/AT, PERRLA, EOMI, anicteric sclerae, throat clear


Neck: Supple, No JVD, No thyromegaly, No LAD


Lungs: congested, wheezing, ronchi


Cardiovascular: RRR, Normal S1, Normal S2


Abdomen: soft


Extremities: clear


Neurological: no change





Internal Medicine Assmt/Plan





- Assessment


Assessment: 


ALOC: multifactorial; observe closely.





Hypokalemia: supplemented.





s/p UGIB? H/H stable.





Psychosis: monitoring closely and adjust meds as needed.





PNA: due to aspiration?  IVPB ABX





COPD: RT protocol.





H/O A. Fib: rate controlled.





Noncompliance: education provided.





DVT prophylaxis.





Nutritional Asmnt/Malnutr-PDOC





- Dietary Evaluation


Malnutrition Findings (Please click <Entered> for more info): 








Nutritional Asmnt/Malnutrition                             Start:  18 10:

52


Text:                                                      Status: Complete    

  


Freq:                                                                          

  


Protocol:                                                                      

  


 Document     18 10:52  AMY  (Rec: 18 11:14  AMY  [id number])


 Nutritional Asmnt/Malnutrition


     Patient General Information


      Nutritional Screening                      Moderate Risk


      Diagnosis                                  hematemesis


      Pertinent Medical Hx/Surgical Hx           PNA, COPD, a fib, CAD, MI, BPH


                                                 , UTI, sepsis, anxiety,


                                                 depression, mild psychosis


      Subjective Information                     Pt seen sitting on bed having


                                                 lunch, consumed almost 100%.


                                                 Pt was not answering RD


                                                 greeting. Per EMR, PO intake


                                                 100%


      Current Diet Order/ Nutrition Support      mech soft chopped


      Pertinent Medications                      vit B12, D5-0.9%ns, colace,


                                                 culturelle, remeron, protonix,


                                                 piperacillin, nacl tab


      Pertinent Labs                              K 3.2, Cl 111, glucose


                                                 120, Ca 8.3


     Nutritional Hx/Data


      Height                                     1.75 m


      Height (Calculated Centimeters)            175.3


      Current Weight (lbs)                       61.689 kg


      Weight (Calculated Kilograms)              61.7


      Weight (Calculated Grams)                  76233.6


      Ideal Body Weight                          160


      Body Mass Index (BMI)                      20.0


     GI Symptoms


      GI Symptoms                                None


      Last BM                                    8/17 x 2


      Difficult in:                              None


      Skin Integrity/Comment:                    bruise reddened to right upper


                                                 back


      Current %PO                                Good (%)


     Estimated Nutritional Goals


      BEE in Kcals:                              Using Current wt


      Calories/Kcals/Kg                          25-30


      Kcals Calculated                           6755-7879


      Protein:                                   Using Current wt


      Protein g/k


      Protein Calculated                         62


      Fluid: ml                                  1550-1860ml (1ml/kcal)


     Nutritional Problem


      1. Problem


       Problem                                   altered nutrition related labs


       Etiology                                  electrolytes imbalance,


                                                 endocrine dysfunction


       Signs/Symptoms:                           K 3.2, Cl 111, glucose 120, Ca


                                                 8.3


     Malnutrition Alert


      Is there a minimum of two criteria         No


       selected?                                 


       Query Text:Check all the applicable       


       criteria. A minimum of two criteria are   


       recommended for diagnosis of either       


       severe or non-severe malnutrition.        


     Malnutrition Related to Morbid Obesity


      Malnutrition related to morbid obesity     No


     Intervention/Recommendation


      Comments                                   1. Continue with mech soft


                                                 chopped diet as ordered.


                                                 2. Monitor PO intake, wt, labs


                                                 and skin integrity


                                                 3. F/U as low risk in 7 days,


                                                 


     Expected Outcomes/Goals


      Expected Outcomes/Goals                    1. PO intake to meet at least


                                                 75% of nutritional needs.


                                                 2. Wt stability, skin to


                                                 remain intact, labs to


                                                 approach WNL.

## 2018-08-19 LAB
ANION GAP SERPL CALC-SCNC: 8.7 MMOL/L (ref 7–16)
BASOPHILS # BLD AUTO: 0.1 TH/CUMM (ref 0–0.2)
BASOPHILS NFR BLD AUTO: 0.9 % (ref 0–2)
BUN SERPL-MCNC: 7 MG/DL (ref 7–25)
CALCIUM SERPL-MCNC: 8.6 MG/DL (ref 8.6–10.3)
CHLORIDE SERPL-SCNC: 108 MEQ/L (ref 98–107)
CO2 SERPL-SCNC: 26.9 MEQ/L (ref 21–31)
CREAT SERPL-MCNC: 0.6 MG/DL (ref 0.7–1.3)
EOSINOPHIL # BLD AUTO: 0.2 TH/CMM (ref 0.1–0.4)
EOSINOPHIL NFR BLD AUTO: 2 % (ref 0–5)
ERYTHROCYTE [DISTWIDTH] IN BLOOD BY AUTOMATED COUNT: 14.7 % (ref 11.5–20)
GLUCOSE SERPL-MCNC: 87 MG/DL (ref 70–105)
HCT VFR BLD CALC: 31.8 % (ref 41–60)
HGB BLD-MCNC: 10.8 GM/DL (ref 12–16)
LYMPHOCYTE AB SER FC-ACNC: 1.2 TH/CMM (ref 1.5–3)
LYMPHOCYTES NFR BLD AUTO: 15.5 % (ref 20–50)
MCH RBC QN AUTO: 31.7 PG (ref 27–31)
MCHC RBC AUTO-ENTMCNC: 33.9 PG (ref 28–36)
MCV RBC AUTO: 93.3 FL (ref 80–99)
MONOCYTES # BLD AUTO: 1 TH/CMM (ref 0.3–1)
MONOCYTES NFR BLD AUTO: 13.1 % (ref 2–10)
NEUTROPHILS # BLD: 5.1 TH/CMM (ref 1.8–8)
NEUTROPHILS NFR BLD AUTO: 68.5 % (ref 40–80)
PLATELET # BLD: 265 TH/CMM (ref 150–400)
PMV BLD AUTO: 6.8 FL
POTASSIUM SERPL-SCNC: 3.6 MEQ/L (ref 3.5–5.1)
RBC # BLD AUTO: 3.41 MIL/CMM (ref 3.8–5.8)
SODIUM SERPL-SCNC: 140 MEQ/L (ref 136–145)
WBC # BLD AUTO: 7.6 TH/CMM (ref 4.8–10.8)

## 2018-08-19 RX ADMIN — DEXTROSE AND SODIUM CHLORIDE SCH MLS/HR: 5; .9 INJECTION, SOLUTION INTRAVENOUS at 00:32

## 2018-08-19 RX ADMIN — Medication SCH TAB: at 09:26

## 2018-08-19 RX ADMIN — DEXTROSE AND SODIUM CHLORIDE SCH MLS/HR: 5; .9 INJECTION, SOLUTION INTRAVENOUS at 16:22

## 2018-08-19 RX ADMIN — Medication SCH EACH: at 09:26

## 2018-08-19 RX ADMIN — PANTOPRAZOLE SODIUM SCH MG: 40 TABLET, DELAYED RELEASE ORAL at 06:46

## 2018-08-19 RX ADMIN — PANTOPRAZOLE SODIUM SCH MG: 40 TABLET, DELAYED RELEASE ORAL at 16:22

## 2018-08-19 RX ADMIN — DILTIAZEM HYDROCHLORIDE SCH MG: 30 TABLET, FILM COATED ORAL at 09:26

## 2018-08-19 NOTE — GI PROGRESS NOTE
Subjective





- Review of Systems


Service Date: 08/19/18


Subjective: 





PATRICIA ORAL DIET.


NO N/V OR GI BLEEDING.


NO ABD PAIN.





Objective





- Results


Result Diagrams: 


 08/19/18 05:00





 08/19/18 05:00


Recent Labs: 


 Laboratory Last Values











WBC  7.6 Th/cmm (4.8-10.8)   08/19/18  05:00    


 


RBC  3.41 Mil/cmm (3.80-5.80)  L  08/19/18  05:00    


 


Hgb  10.8 gm/dL (12-16)  L  08/19/18  05:00    


 


Hct  31.8 % (41.0-60)  L  08/19/18  05:00    


 


MCV  93.3 fl (80-99)   08/19/18  05:00    


 


MCH  31.7 pg (27.0-31.0)  H  08/19/18  05:00    


 


MCHC Differential  33.9 pg (28.0-36.0)   08/19/18  05:00    


 


RDW  14.7 % (11.5-20.0)   08/19/18  05:00    


 


Plt Count  265 Th/cmm (150-400)  D 08/19/18  05:00    


 


MPV  6.8 fl  08/19/18  05:00    


 


Neutrophils %  68.5 % (40.0-80.0)   08/19/18  05:00    


 


Lymphocytes %  15.5 % (20.0-50.0)  L  08/19/18  05:00    


 


Monocytes %  13.1 % (2.0-10.0)  H  08/19/18  05:00    


 


Eosinophils %  2.0 % (0.0-5.0)   08/19/18  05:00    


 


Basophils %  0.9 % (0.0-2.0)   08/19/18  05:00    


 


Specimen Source  ARTERIAL   08/14/18  22:28    


 


Sample Site  Right Radial   08/14/18  22:28    


 


pH  7.38  (7.35-7.45)   08/14/18  22:28    


 


pCO2  45.0 mmHg (35.0-45.0)   08/14/18  22:28    


 


pO2  159.0 mmHg (80.0-100.0)  H  08/14/18  22:28    


 


HCO3  25.8 mEq/L (20.0-26.0)   08/14/18  22:28    


 


Base Excess  1.1 mEq/L (-3.0-3.0)   08/14/18  22:28    


 


O2 Saturation  99.0 % (92.0-100.0)   08/14/18  22:28    


 


Mata Test  Positive   08/14/18  22:28    


 


Vent Rate  N/A   08/14/18  22:28    


 


Inspired O2  100   08/14/18  22:28    


 


Tidal Volume  N/A   08/14/18  22:28    


 


PEEP  N/A   08/14/18  22:28    


 


Pressure (ins/psv/peep)  N/A   08/14/18  22:28    


 


Critical Value  MM,RCP   08/14/18  22:28    


 


Sodium  140 mEq/L (136-145)   08/19/18  05:00    


 


Potassium  3.6 mEq/L (3.5-5.1)   08/19/18  05:00    


 


Chloride  108 mEq/L ()  H  08/19/18  05:00    


 


Carbon Dioxide  26.9 mEq/L (21.0-31.0)   08/19/18  05:00    


 


Anion Gap  8.7  (7.0-16.0)   08/19/18  05:00    


 


BUN  7 mg/dL (7-25)   08/19/18  05:00    


 


Creatinine  0.6 mg/dL (0.7-1.3)  L  08/19/18  05:00    


 


Est GFR ( Amer)  > 60.0 ml/min (>90)   08/19/18  05:00    


 


Est GFR (Non-Af Amer)  > 60.0 ml/min  08/19/18  05:00    


 


BUN/Creatinine Ratio  11.7   08/19/18  05:00    


 


Glucose  87 mg/dL ()   08/19/18  05:00    


 


POC Glucose  137 MG/DL (70 - 105)  H  08/13/18  22:22    


 


Calcium  8.6 mg/dL (8.6-10.3)   08/19/18  05:00    


 


Total Bilirubin  0.3 mg/dL (0.3-1.0)   08/18/18  08:44    


 


AST  14 U/L (13-39)   08/18/18  08:44    


 


ALT  17 U/L (7-52)   08/18/18  08:44    


 


Alkaline Phosphatase  66 U/L ()   08/18/18  08:44    


 


Total Protein  6.1 gm/dL (6.0-8.3)   08/18/18  08:44    


 


Albumin  3.3 gm/dL (4.2-5.5)  L  08/18/18  08:44    


 


Globulin  2.8 gm/dL  08/18/18  08:44    


 


Albumin/Globulin Ratio  1.2  (1.0-1.8)   08/18/18  08:44    














- Physical Exam


Vitals and I&O: 


 Vital Signs











Temp  97.0 F   08/19/18 07:32


 


Pulse  78   08/19/18 07:32


 


Resp  18   08/19/18 07:32


 


BP  139/76   08/19/18 07:32


 


Pulse Ox  98   08/19/18 07:32








 Intake & Output











 08/18/18 08/19/18 08/19/18





 18:59 06:59 18:59


 


Intake Total 6613.832 5374 


 


Balance 0979.226 4305 


 


Weight (lbs) 63.911 kg 63.616 kg 


 


Intake:   


 


  Intake, IV Amount 864.182 9284 


 


    D5-0.9%Ns 1,000 ml @ 80 546.298 2803 





    mls/hr IV .[id number] [facility] Rx   





    #:[id number]   


 


    Piperacillin Sodium/ 50 100 





    Tazobact 3.375 gm In   





    Sodium Chloride 0.9% 50   





    ml @ 100 mls/hr IV Q8HR   





    [facility] Rx#:[id number]   


 


  Oral 500 400 


 


Other:   


 


  # Voids 5 3 


 


  # Bowel Movements 2 2 


 


  Stool Characteristics Soft Soft 


 


  Weight Source Bedscale Bedscale 











Active Medications: 


Current Medications





Albuterol/Ipratropium (Duoneb Neb)  3 ml HHN Q2H PRN


   PRN Reason: Wheezing


   Stop: 10/13/18 08:18


   Last Admin: 08/16/18 03:00 Dose:  3 ml


Amiodarone HCl (Cordarone)  200 mg PO DAILY [facility]


   Stop: 10/13/18 08:59


   Last Admin: 08/18/18 08:30 Dose:  200 mg


Cyanocobalamin (Vitamin B12)  1,000 mcg PO DAILY [facility]


   Stop: 10/13/18 08:59


   Last Admin: 08/18/18 08:31 Dose:  1,000 mcg


Diltiazem HCl (Cardizem)  60 mg PO DAILY [facility]


   Stop: 10/13/18 08:59


   Last Admin: 08/18/18 08:31 Dose:  60 mg


Divalproex Sodium (Depakote Dr)  500 mg PO BID [facility]; Protocol


   Stop: 10/13/18 08:59


   Last Admin: 08/18/18 16:01 Dose:  500 mg


Docusate Sodium (Colace)  100 mg PO BID PRN


   PRN Reason: CONSTIPATION


   Stop: 10/13/18 08:18


   Last Admin: 08/16/18 21:22 Dose:  100 mg


Gabapentin (Neurontin)  300 mg PO TID [facility]


   Stop: 10/13/18 08:59


   Last Admin: 08/18/18 20:23 Dose:  300 mg


Dextrose/Sodium Chloride (D5-0.9%Ns)  1,000 mls @ 80 mls/hr IV .[id number] [facility]


   Stop: 10/14/18 00:29


   Last Admin: 08/19/18 00:32 Dose:  80 mls/hr


Piperacillin Sod/Tazobactam (Sod 3.375 gm/ Sodium Chloride)  50 mls @ 100 mls/

hr IV Q8HR [facility]


   Stop: 10/13/18 20:59


   Last Infusion: 08/19/18 05:28 Dose:  Infused


Lactobacillus Rhamnosus (Culturelle 15b)  1 each PO DAILY [facility]


   Stop: 10/16/18 08:59


   Last Admin: 08/18/18 08:30 Dose:  1 each


Lorazepam (Ativan)  2 mg IVP Q6H PRN; Protocol


   PRN Reason: Agitation


   Stop: 10/13/18 08:20


   Last Admin: 08/15/18 16:59 Dose:  2 mg


Magnesium Hydroxide (Milk Of Magnesia)  30 ml PO DAILY PRN


   PRN Reason: IF COLACE INEFFECTIVE


   Stop: 10/14/18 09:32


   Last Admin: 08/16/18 10:26 Dose:  30 ml


Mirtazapine (Remeron)  15 mg PO HS [facility]; Protocol


   Stop: 10/13/18 20:59


   Last Admin: 08/18/18 20:23 Dose:  15 mg


Miscellaneous (Probiotic Screen)  1 ea MC PRN PRN


   PRN Reason: PROTOCOL


   Stop: 10/16/18 08:29


Olanzapine (Zyprexa)  5 mg PO DAILY [facility]; Protocol


   Stop: 10/13/18 08:59


   Last Admin: 08/18/18 08:30 Dose:  5 mg


Olanzapine (Zyprexa)  10 mg PO HS [facility]; Protocol


   Stop: 10/13/18 20:59


   Last Admin: 08/18/18 20:24 Dose:  10 mg


Ondansetron HCl (Zofran)  4 mg IV Q4H PRN


   PRN Reason: Nausea / Vomiting


   Stop: 10/12/18 23:06


   Last Admin: 08/15/18 00:34 Dose:  4 mg


Oxybutynin Chloride (Ditropan)  5 mg PO HS [facility]


   Stop: 10/13/18 20:59


   Last Admin: 08/18/18 20:23 Dose:  5 mg


Pantoprazole Sodium (Protonix)  40 mg PO BIDAC [facility]


   Stop: 10/13/18 08:59


   Last Admin: 08/19/18 06:46 Dose:  40 mg


Sodium Chloride (Nacl Tab)  1 gm PO BID [facility]


   Stop: 10/13/18 08:59


   Last Admin: 08/18/18 16:01 Dose:  1 gm


Sodium Phosphate (Fleet Enema)  135 ml RC DAILY PRN


   PRN Reason: IF MOM INEFFECTIVE


   Stop: 10/14/18 09:34


   Last Admin: 08/16/18 03:15 Dose:  135 ml


Tamsulosin HCl (Flomax)  0.4 mg PO DAILY [facility]


   Stop: 10/13/18 08:59


   Last Admin: 08/18/18 08:31 Dose:  0.4 mg








General: No acute distress


HEENT: Atraumatic


Neck: Supple


Cardiovascular: Regular rate


Lungs: Clear to auscultation


Abdomen: Bowel sounds, Soft, no Tender





Assessment/Plan





- Assessment


Assessment: 





IMPRESSION:


1. Concern for coffee ground emesis - LIKELY UGIB, NOW RESOLVED.


2. Dyspepsia


3. Psychosis


4. CONSTIPATION.





RECS:


- PPI BID


- Can hold off on EGD, not emergent; UNABLE TO GET CONSENT.


- ORAL DIET AS PATRICIA.


- MONITOR HGB.


- STOOL SOFTENERS.





GI WISE STABLE.

## 2018-08-20 RX ADMIN — Medication SCH TAB: at 12:35

## 2018-08-20 RX ADMIN — DEXTROSE AND SODIUM CHLORIDE SCH: 5; .9 INJECTION, SOLUTION INTRAVENOUS at 08:08

## 2018-08-20 RX ADMIN — DEXTROSE AND SODIUM CHLORIDE SCH MLS/HR: 5; .9 INJECTION, SOLUTION INTRAVENOUS at 06:18

## 2018-08-20 RX ADMIN — DILTIAZEM HYDROCHLORIDE SCH MG: 30 TABLET, FILM COATED ORAL at 12:34

## 2018-08-20 RX ADMIN — PANTOPRAZOLE SODIUM SCH MG: 40 TABLET, DELAYED RELEASE ORAL at 17:29

## 2018-08-20 RX ADMIN — Medication SCH EACH: at 12:35

## 2018-08-20 RX ADMIN — PANTOPRAZOLE SODIUM SCH: 40 TABLET, DELAYED RELEASE ORAL at 06:31

## 2018-08-20 RX ADMIN — DEXTROSE AND SODIUM CHLORIDE SCH: 5; .9 INJECTION, SOLUTION INTRAVENOUS at 08:07

## 2018-08-20 RX ADMIN — DEXTROSE AND SODIUM CHLORIDE SCH: 5; .9 INJECTION, SOLUTION INTRAVENOUS at 18:26

## 2018-08-20 RX ADMIN — DEXTROSE AND SODIUM CHLORIDE SCH MLS/HR: 5; .9 INJECTION, SOLUTION INTRAVENOUS at 21:39

## 2018-08-20 NOTE — GI PROGRESS NOTE
Subjective





- Review of Systems


Service Date: 08/20/18


Subjective: 





PATRICIA ORAL DIET.


NO N/V OR GI BLEEDING.


NO ABD PAIN.





Objective





- Results


Result Diagrams: 


 08/19/18 05:00





 08/19/18 05:00


Recent Labs: 


 Laboratory Last Values











WBC  7.6 Th/cmm (4.8-10.8)   08/19/18  05:00    


 


RBC  3.41 Mil/cmm (3.80-5.80)  L  08/19/18  05:00    


 


Hgb  10.8 gm/dL (12-16)  L  08/19/18  05:00    


 


Hct  31.8 % (41.0-60)  L  08/19/18  05:00    


 


MCV  93.3 fl (80-99)   08/19/18  05:00    


 


MCH  31.7 pg (27.0-31.0)  H  08/19/18  05:00    


 


MCHC Differential  33.9 pg (28.0-36.0)   08/19/18  05:00    


 


RDW  14.7 % (11.5-20.0)   08/19/18  05:00    


 


Plt Count  265 Th/cmm (150-400)  D 08/19/18  05:00    


 


MPV  6.8 fl  08/19/18  05:00    


 


Neutrophils %  68.5 % (40.0-80.0)   08/19/18  05:00    


 


Lymphocytes %  15.5 % (20.0-50.0)  L  08/19/18  05:00    


 


Monocytes %  13.1 % (2.0-10.0)  H  08/19/18  05:00    


 


Eosinophils %  2.0 % (0.0-5.0)   08/19/18  05:00    


 


Basophils %  0.9 % (0.0-2.0)   08/19/18  05:00    


 


Specimen Source  ARTERIAL   08/14/18  22:28    


 


Sample Site  Right Radial   08/14/18  22:28    


 


pH  7.38  (7.35-7.45)   08/14/18  22:28    


 


pCO2  45.0 mmHg (35.0-45.0)   08/14/18  22:28    


 


pO2  159.0 mmHg (80.0-100.0)  H  08/14/18  22:28    


 


HCO3  25.8 mEq/L (20.0-26.0)   08/14/18  22:28    


 


Base Excess  1.1 mEq/L (-3.0-3.0)   08/14/18  22:28    


 


O2 Saturation  99.0 % (92.0-100.0)   08/14/18  22:28    


 


Mata Test  Positive   08/14/18  22:28    


 


Vent Rate  N/A   08/14/18  22:28    


 


Inspired O2  100   08/14/18  22:28    


 


Tidal Volume  N/A   08/14/18  22:28    


 


PEEP  N/A   08/14/18  22:28    


 


Pressure (ins/psv/peep)  N/A   08/14/18  22:28    


 


Critical Value  MM,RCP   08/14/18  22:28    


 


Sodium  140 mEq/L (136-145)   08/19/18  05:00    


 


Potassium  3.6 mEq/L (3.5-5.1)   08/19/18  05:00    


 


Chloride  108 mEq/L ()  H  08/19/18  05:00    


 


Carbon Dioxide  26.9 mEq/L (21.0-31.0)   08/19/18  05:00    


 


Anion Gap  8.7  (7.0-16.0)   08/19/18  05:00    


 


BUN  7 mg/dL (7-25)   08/19/18  05:00    


 


Creatinine  0.6 mg/dL (0.7-1.3)  L  08/19/18  05:00    


 


Est GFR ( Amer)  > 60.0 ml/min (>90)   08/19/18  05:00    


 


Est GFR (Non-Af Amer)  > 60.0 ml/min  08/19/18  05:00    


 


BUN/Creatinine Ratio  11.7   08/19/18  05:00    


 


Glucose  87 mg/dL ()   08/19/18  05:00    


 


POC Glucose  137 MG/DL (70 - 105)  H  08/13/18  22:22    


 


Calcium  8.6 mg/dL (8.6-10.3)   08/19/18  05:00    


 


Total Bilirubin  0.3 mg/dL (0.3-1.0)   08/18/18  08:44    


 


AST  14 U/L (13-39)   08/18/18  08:44    


 


ALT  17 U/L (7-52)   08/18/18  08:44    


 


Alkaline Phosphatase  66 U/L ()   08/18/18  08:44    


 


Total Protein  6.1 gm/dL (6.0-8.3)   08/18/18  08:44    


 


Albumin  3.3 gm/dL (4.2-5.5)  L  08/18/18  08:44    


 


Globulin  2.8 gm/dL  08/18/18  08:44    


 


Albumin/Globulin Ratio  1.2  (1.0-1.8)   08/18/18  08:44    














- Physical Exam


Vitals and I&O: 


 Vital Signs











Temp  96.9 F   08/20/18 11:50


 


Pulse  82   08/20/18 11:50


 


Resp  18   08/20/18 11:50


 


BP  139/85   08/20/18 11:50


 


Pulse Ox  95   08/20/18 11:50








 Intake & Output











 08/19/18 08/20/18 08/20/18





 18:59 06:59 18:59


 


Intake Total 2750 1050 


 


Balance 2750 1050 


 


Weight (lbs) 64.183 kg  


 


Intake:   


 


  Intake, IV Amount 1050 1050 


 


    D5-0.9%Ns 1,000 ml @ 80 1000 1000 





    mls/hr IV .[id number] [facility] Rx   





    #:[id number]   


 


    Piperacillin Sodium/ 50 50 





    Tazobact 3.375 gm In   





    Sodium Chloride 0.9% 50   





    ml @ 100 mls/hr IV Q8HR   





    [facility] Rx#:[id number]   


 


  Oral 1700  


 


Other:   


 


  # Voids 4  


 


  # Bowel Movements 2  


 


  Stool Characteristics Soft Soft 





 Liquid  





 Brown  


 


  Weight Source Bedscale  











Active Medications: 


Current Medications





Albuterol/Ipratropium (Duoneb Neb)  3 ml HHN Q2H PRN


   PRN Reason: Wheezing


   Stop: 10/13/18 08:18


   Last Admin: 08/16/18 03:00 Dose:  3 ml


Amiodarone HCl (Cordarone)  200 mg PO DAILY [facility]


   Stop: 10/13/18 08:59


   Last Admin: 08/19/18 09:26 Dose:  200 mg


Cyanocobalamin (Vitamin B12)  1,000 mcg PO DAILY [facility]


   Stop: 10/13/18 08:59


   Last Admin: 08/19/18 09:26 Dose:  1,000 mcg


Diltiazem HCl (Cardizem)  60 mg PO DAILY [facility]


   Stop: 10/13/18 08:59


   Last Admin: 08/19/18 09:26 Dose:  60 mg


Divalproex Sodium (Depakote Dr)  500 mg PO BID [facility]; Protocol


   Stop: 10/13/18 08:59


   Last Admin: 08/19/18 16:22 Dose:  500 mg


Docusate Sodium (Colace)  100 mg PO BID PRN


   PRN Reason: CONSTIPATION


   Stop: 10/13/18 08:18


   Last Admin: 08/16/18 21:22 Dose:  100 mg


Gabapentin (Neurontin)  300 mg PO TID [facility]


   Stop: 10/13/18 08:59


   Last Admin: 08/19/18 23:39 Dose:  Not Given


Dextrose/Sodium Chloride (D5-0.9%Ns)  1,000 mls @ 80 mls/hr IV .[id number] [facility]


   Stop: 10/14/18 00:29


   Last Admin: 08/20/18 08:08 Dose:  Not Given


Piperacillin Sod/Tazobactam (Sod 3.375 gm/ Sodium Chloride)  50 mls @ 100 mls/

hr IV Q8HR [facility]


   Stop: 10/13/18 20:59


   Last Admin: 08/20/18 04:42 Dose:  100 mls/hr


Lactobacillus Rhamnosus (Culturelle 15b)  1 each PO DAILY [facility]


   Stop: 10/16/18 08:59


   Last Admin: 08/19/18 09:26 Dose:  1 each


Lorazepam (Ativan)  2 mg IVP Q6H PRN; Protocol


   PRN Reason: Agitation


   Stop: 10/13/18 08:20


   Last Admin: 08/15/18 16:59 Dose:  2 mg


Magnesium Hydroxide (Milk Of Magnesia)  30 ml PO DAILY PRN


   PRN Reason: IF COLACE INEFFECTIVE


   Stop: 10/14/18 09:32


   Last Admin: 08/16/18 10:26 Dose:  30 ml


Mirtazapine (Remeron)  15 mg PO HS [facility]; Protocol


   Stop: 10/13/18 20:59


   Last Admin: 08/19/18 23:39 Dose:  Not Given


Miscellaneous (Probiotic Screen)  1 ea MC PRN PRN


   PRN Reason: PROTOCOL


   Stop: 10/16/18 08:29


Olanzapine (Zyprexa)  5 mg PO DAILY [facility]; Protocol


   Stop: 10/13/18 08:59


   Last Admin: 08/19/18 09:27 Dose:  5 mg


Olanzapine (Zyprexa)  10 mg PO HS [facility]; Protocol


   Stop: 10/13/18 20:59


   Last Admin: 08/19/18 23:39 Dose:  Not Given


Ondansetron HCl (Zofran)  4 mg IV Q4H PRN


   PRN Reason: Nausea / Vomiting


   Stop: 10/12/18 23:06


   Last Admin: 08/15/18 00:34 Dose:  4 mg


Oxybutynin Chloride (Ditropan)  5 mg PO HS [facility]


   Stop: 10/13/18 20:59


   Last Admin: 08/19/18 23:39 Dose:  Not Given


Pantoprazole Sodium (Protonix)  40 mg PO BIDAC [facility]


   Stop: 10/13/18 08:59


   Last Admin: 08/20/18 06:31 Dose:  Not Given


Sodium Chloride (Nacl Tab)  1 gm PO BID [facility]


   Stop: 10/13/18 08:59


   Last Admin: 08/19/18 16:22 Dose:  1 gm


Sodium Phosphate (Fleet Enema)  135 ml RC DAILY PRN


   PRN Reason: IF MOM INEFFECTIVE


   Stop: 10/14/18 09:34


   Last Admin: 08/16/18 03:15 Dose:  135 ml


Tamsulosin HCl (Flomax)  0.4 mg PO DAILY [facility]


   Stop: 10/13/18 08:59


   Last Admin: 08/19/18 09:26 Dose:  0.4 mg








General: No acute distress


HEENT: Atraumatic


Neck: Supple


Cardiovascular: Regular rate


Lungs: Clear to auscultation


Abdomen: Bowel sounds, Soft, no Tender





Assessment/Plan





- Assessment


Assessment: 





IMPRESSION:


1. Concern for coffee ground emesis - LIKELY UGIB, NOW RESOLVED.


2. Dyspepsia


3. Psychosis


4. CONSTIPATION.





RECS:


- PPI BID


- Can hold off on EGD, not emergent; UNABLE TO GET CONSENT.


- ORAL DIET AS PATRICIA.


- MONITOR HGB.


- STOOL SOFTENERS.





GI PLUMMER STABLE.


WILL SEE PATIENT INTERMITTENTLY AND AS NEEDED.


PLEASE CALL US IF QUESTIONS.

## 2018-08-20 NOTE — INTERNAL MEDICINE PROG NOTE
Internal Medicine Subjective





- Subjective


Service Date: 18


Patient seen and examined:: without staff


Patient is:: awake, verbal, eyes closed, in bed, agitated, confused


Per staff patient has:: no adverse event





Internal Medicine Objective





- Results


Result Diagrams: 


 18 05:00





 18 05:00


Recent Labs: 


 Laboratory Last Values











WBC  7.6 Th/cmm (4.8-10.8)   18  05:00    


 


RBC  3.41 Mil/cmm (3.80-5.80)  L  18  05:00    


 


Hgb  10.8 gm/dL (12-16)  L  18  05:00    


 


Hct  31.8 % (41.0-60)  L  18  05:00    


 


MCV  93.3 fl (80-99)   18  05:00    


 


MCH  31.7 pg (27.0-31.0)  H  18  05:00    


 


MCHC Differential  33.9 pg (28.0-36.0)   18  05:00    


 


RDW  14.7 % (11.5-20.0)   18  05:00    


 


Plt Count  265 Th/cmm (150-400)  D 18  05:00    


 


MPV  6.8 fl  18  05:00    


 


Neutrophils %  68.5 % (40.0-80.0)   18  05:00    


 


Lymphocytes %  15.5 % (20.0-50.0)  L  18  05:00    


 


Monocytes %  13.1 % (2.0-10.0)  H  18  05:00    


 


Eosinophils %  2.0 % (0.0-5.0)   18  05:00    


 


Basophils %  0.9 % (0.0-2.0)   18  05:00    


 


Specimen Source  ARTERIAL   18  22:28    


 


Sample Site  Right Radial   18  22:28    


 


pH  7.38  (7.35-7.45)   18  22:28    


 


pCO2  45.0 mmHg (35.0-45.0)   18  22:28    


 


pO2  159.0 mmHg (80.0-100.0)  H  18  22:28    


 


HCO3  25.8 mEq/L (20.0-26.0)   18  22:28    


 


Base Excess  1.1 mEq/L (-3.0-3.0)   18  22:28    


 


O2 Saturation  99.0 % (92.0-100.0)   18  22:28    


 


Mata Test  Positive   18  22:28    


 


Vent Rate  N/A   18  22:28    


 


Inspired O2  100   18  22:28    


 


Tidal Volume  N/A   18  22:28    


 


PEEP  N/A   18  22:28    


 


Pressure (ins/psv/peep)  N/A   18  22:28    


 


Critical Value  MM,RCP   18  22:28    


 


Sodium  140 mEq/L (136-145)   18  05:00    


 


Potassium  3.6 mEq/L (3.5-5.1)   18  05:00    


 


Chloride  108 mEq/L ()  H  18  05:00    


 


Carbon Dioxide  26.9 mEq/L (21.0-31.0)   18  05:00    


 


Anion Gap  8.7  (7.0-16.0)   18  05:00    


 


BUN  7 mg/dL (7-25)   18  05:00    


 


Creatinine  0.6 mg/dL (0.7-1.3)  L  18  05:00    


 


Est GFR ( Amer)  > 60.0 ml/min (>90)   18  05:00    


 


Est GFR (Non-Af Amer)  > 60.0 ml/min  18  05:00    


 


BUN/Creatinine Ratio  11.7   18  05:00    


 


Glucose  87 mg/dL ()   18  05:00    


 


POC Glucose  137 MG/DL (70 - 105)  H  18  22:22    


 


Calcium  8.6 mg/dL (8.6-10.3)   18  05:00    


 


Total Bilirubin  0.3 mg/dL (0.3-1.0)   18  08:44    


 


AST  14 U/L (13-39)   18  08:44    


 


ALT  17 U/L (7-52)   18  08:44    


 


Alkaline Phosphatase  66 U/L ()   18  08:44    


 


Total Protein  6.1 gm/dL (6.0-8.3)   18  08:44    


 


Albumin  3.3 gm/dL (4.2-5.5)  L  18  08:44    


 


Globulin  2.8 gm/dL  18  08:44    


 


Albumin/Globulin Ratio  1.2  (1.0-1.8)   18  08:44    














- Physical Exam


Vitals and I&O: 


 Vital Signs











Temp  96.7 F   18 08:09


 


Pulse  85   18 08:09


 


Resp  18   18 08:09


 


BP  141/82   18 08:09


 


Pulse Ox  93   18 08:09








 Intake & Output











 18





 18:59 06:59 18:59


 


Intake Total 2750 1050 


 


Balance 2750 1050 


 


Weight (lbs) 64.183 kg  


 


Intake:   


 


  Intake, IV Amount 1050 1050 


 


    D5-0.9%Ns 1,000 ml @ 80 1000 1000 





    mls/hr IV .[id number] [facility] Rx   





    #:[id number]   


 


    Piperacillin Sodium/ 50 50 





    Tazobact 3.375 gm In   





    Sodium Chloride 0.9% 50   





    ml @ 100 mls/hr IV Q8HR   





    [facility] Rx#:[id number]   


 


  Oral 1700  


 


Other:   


 


  # Voids 4  


 


  # Bowel Movements 2  


 


  Stool Characteristics Soft Soft 





 Liquid  





 Brown  


 


  Weight Source Bedscale  











Active Medications: 


Current Medications





Albuterol/Ipratropium (Duoneb Neb)  3 ml HHN Q2H PRN


   PRN Reason: Wheezing


   Stop: 10/13/18 08:18


   Last Admin: 18 03:00 Dose:  3 ml


Amiodarone HCl (Cordarone)  200 mg PO DAILY [facility]


   Stop: 10/13/18 08:59


   Last Admin: 18 09:26 Dose:  200 mg


Cyanocobalamin (Vitamin B12)  1,000 mcg PO DAILY [facility]


   Stop: 10/13/18 08:59


   Last Admin: 18 09:26 Dose:  1,000 mcg


Diltiazem HCl (Cardizem)  60 mg PO DAILY [facility]


   Stop: 10/13/18 08:59


   Last Admin: 18 09:26 Dose:  60 mg


Divalproex Sodium (Depakote Dr)  500 mg PO BID [facility]; Protocol


   Stop: 10/13/18 08:59


   Last Admin: 18 16:22 Dose:  500 mg


Docusate Sodium (Colace)  100 mg PO BID PRN


   PRN Reason: CONSTIPATION


   Stop: 10/13/18 08:18


   Last Admin: 18 21:22 Dose:  100 mg


Gabapentin (Neurontin)  300 mg PO TID [facility]


   Stop: 10/13/18 08:59


   Last Admin: 18 23:39 Dose:  Not Given


Dextrose/Sodium Chloride (D5-0.9%Ns)  1,000 mls @ 80 mls/hr IV .[id number] [facility]


   Stop: 10/14/18 00:29


   Last Admin: 18 08:08 Dose:  Not Given


Piperacillin Sod/Tazobactam (Sod 3.375 gm/ Sodium Chloride)  50 mls @ 100 mls/

hr IV Q8HR [facility]


   Stop: 10/13/18 20:59


   Last Admin: 18 04:42 Dose:  100 mls/hr


Lactobacillus Rhamnosus (Culturelle 15b)  1 each PO DAILY [facility]


   Stop: 10/16/18 08:59


   Last Admin: 18 09:26 Dose:  1 each


Lorazepam (Ativan)  2 mg IVP Q6H PRN; Protocol


   PRN Reason: Agitation


   Stop: 10/13/18 08:20


   Last Admin: 08/15/18 16:59 Dose:  2 mg


Magnesium Hydroxide (Milk Of Magnesia)  30 ml PO DAILY PRN


   PRN Reason: IF COLACE INEFFECTIVE


   Stop: 10/14/18 09:32


   Last Admin: 18 10:26 Dose:  30 ml


Mirtazapine (Remeron)  15 mg PO HS [facility]; Protocol


   Stop: 10/13/18 20:59


   Last Admin: 18 23:39 Dose:  Not Given


Miscellaneous (Probiotic Screen)  1 ea MC PRN PRN


   PRN Reason: PROTOCOL


   Stop: 10/16/18 08:29


Olanzapine (Zyprexa)  5 mg PO DAILY [facility]; Protocol


   Stop: 10/13/18 08:59


   Last Admin: 18 09:27 Dose:  5 mg


Olanzapine (Zyprexa)  10 mg PO HS [facility]; Protocol


   Stop: 10/13/18 20:59


   Last Admin: 18 23:39 Dose:  Not Given


Ondansetron HCl (Zofran)  4 mg IV Q4H PRN


   PRN Reason: Nausea / Vomiting


   Stop: 10/12/18 23:06


   Last Admin: 08/15/18 00:34 Dose:  4 mg


Oxybutynin Chloride (Ditropan)  5 mg PO HS [facility]


   Stop: 10/13/18 20:59


   Last Admin: 18 23:39 Dose:  Not Given


Pantoprazole Sodium (Protonix)  40 mg PO BIDAC [facility]


   Stop: 10/13/18 08:59


   Last Admin: 18 06:31 Dose:  Not Given


Sodium Chloride (Nacl Tab)  1 gm PO BID [facility]


   Stop: 10/13/18 08:59


   Last Admin: 18 16:22 Dose:  1 gm


Sodium Phosphate (Fleet Enema)  135 ml RC DAILY PRN


   PRN Reason: IF MOM INEFFECTIVE


   Stop: 10/14/18 09:34


   Last Admin: 18 03:15 Dose:  135 ml


Tamsulosin HCl (Flomax)  0.4 mg PO DAILY [facility]


   Stop: 10/13/18 08:59


   Last Admin: 18 09:26 Dose:  0.4 mg








General: weak, lethargic, congested, thin


HEENT: NC/AT, PERRLA, EOMI, anicteric sclerae, throat clear


Neck: Supple, No JVD, No thyromegaly, No LAD


Lungs: congested, wheezing, ronchi


Cardiovascular: RRR, Normal S1, Normal S2


Abdomen: soft


Extremities: clear


Neurological: no change





Internal Medicine Assmt/Plan





- Assessment


Assessment: 


PNA: due to aspiration?  IVPB ABX





ALOC: multifactorial; observe closely.





Hypokalemia: supplemented.





s/p UGIB? H/H stable.





Psychosis: monitoring closely and adjust meds as needed.





COPD: RT protocol.





H/O A. Fib: rate controlled.





Noncompliance: education provided.





DVT prophylaxis.





Nutritional Asmnt/Malnutr-PDOC





- Dietary Evaluation


Malnutrition Findings (Please click <Entered> for more info): 








Nutritional Asmnt/Malnutrition                             Start:  18 10:

52


Text:                                                      Status: Complete    

  


Freq:                                                                          

  


Protocol:                                                                      

  


 Document     18 10:52  LCHENG  (Rec: 18 11:14  [facility]  [email or web address])


 Nutritional Asmnt/Malnutrition


     Patient General Information


      Nutritional Screening                      Moderate Risk


      Diagnosis                                  hematemesis


      Pertinent Medical Hx/Surgical Hx           PNA, COPD, a fib, CAD, MI, BPH


                                                 , UTI, sepsis, anxiety,


                                                 depression, mild psychosis


      Subjective Information                     Pt seen sitting on bed having


                                                 lunch, consumed almost 100%.


                                                 Pt was not answering RD


                                                 greeting. Per EMR, PO intake


                                                 100%


      Current Diet Order/ Nutrition Support      [facility] [place] soft chopped


      Pertinent Medications                      vit B12, D5-0.9%ns, colace,


                                                 culturelle, remeron, protonix,


                                                 piperacillin, nacl tab


      Pertinent Labs                              K 3.2, Cl 111, glucose


                                                 120, Ca 8.3


     Nutritional Hx/Data


      Height                                     1.75 m


      Height (Calculated Centimeters)            175.3


      Current Weight (lbs)                       61.689 kg


      Weight (Calculated Kilograms)              61.7


      Weight (Calculated Grams)                  88459.6


      Ideal Body Weight                          160


      Body Mass Index (BMI)                      20.0


     GI Symptoms


      GI Symptoms                                None


      Last BM                                    8/17 x 2


      Difficult in:                              None


      Skin Integrity/Comment:                    bruise reddened to right upper


                                                 back


      Current %PO                                Good (%)


     Estimated Nutritional Goals


      BEE in Kcals:                              Using Current wt


      Calories/Kcals/Kg                          25-30


      Kcals Calculated                           3833-1555


      Protein:                                   Using Current wt


      Protein g/k


      Protein Calculated                         62


      Fluid: ml                                  1550-1860ml (1ml/kcal)


     Nutritional Problem


      1. Problem


       Problem                                   altered nutrition related labs


       Etiology                                  electrolytes imbalance,


                                                 endocrine dysfunction


       Signs/Symptoms:                           K 3.2, Cl 111, glucose 120, Ca


                                                 8.3


     Malnutrition Alert


      Is there a minimum of two criteria         No


       selected?                                 


       Query Text:Check all the applicable       


       criteria. A minimum of two criteria are   


       recommended for diagnosis of either       


       severe or non-severe malnutrition.        


     Malnutrition Related to Morbid Obesity


      Malnutrition related to morbid obesity     No


     Intervention/Recommendation


      Comments                                   1. Continue with [facility] soft


                                                 chopped diet as ordered.


                                                 2. Monitor PO intake, wt, labs


                                                 and skin integrity


                                                 3. F/U as low risk in 7 days,


                                                 


     Expected Outcomes/Goals


      Expected Outcomes/Goals                    1. PO intake to meet at least


                                                 75% of nutritional needs.


                                                 2. Wt stability, skin to


                                                 remain intact, labs to


                                                 approach WNL.

## 2018-08-21 RX ADMIN — DILTIAZEM HYDROCHLORIDE SCH MG: 30 TABLET, FILM COATED ORAL at 09:43

## 2018-08-21 RX ADMIN — Medication SCH TAB: at 09:42

## 2018-08-21 RX ADMIN — PANTOPRAZOLE SODIUM SCH: 40 TABLET, DELAYED RELEASE ORAL at 06:39

## 2018-08-21 RX ADMIN — DEXTROSE AND SODIUM CHLORIDE SCH: 5; .9 INJECTION, SOLUTION INTRAVENOUS at 06:51

## 2018-08-21 RX ADMIN — Medication SCH EACH: at 09:43

## 2018-08-22 NOTE — DISCHARGE SUMMARY
DATE OF DISCHARGE:  08/21/2018



FINAL DIAGNOSES:

1.  Questionable gastrointestinal bleeding, resolved.

2.  Altered level of consciousness, improved.

3.  Noncompliance, improved.

4.  Psychosis, improved.

5.  Seizure, controlled.

6.  Pneumonia, treated.

7.  Atrial fibrillation with rate control.



HOSPITAL COURSE:  The patient is a 68-year-old male admitted due to acute upper

GI bleeding; however, his H and H was stable.  GI consultation was requested. 

Due to patient's mental status and difficulty to obtain consent, the EGD was not

done.  The patient's H and H is stable.  The patient is actually also very

confused, agitated from time to time, and noncompliant, but education provided

with some effect.  He received antibiotics and the pneumonia cleared.  The

patient was eventually accepted back to nursing home.



DISCHARGE CONDITION:  Stable.



DISPOSITION:  [facility].



DISCHARGE MEDICATION:  Continue medication from the hospital.



DIET:  1800 ADA cardiac, renal diet.



ACTIVITY:  Bed rest with physical therapy.



FOLLOWUP:  One week.





DD: 08/21/2018 23:53

DT: 08/22/2018 01:11

JOB# [id number]  [id number]

## 2018-08-22 NOTE — INTERNAL MEDICINE PROG NOTE
Internal Medicine Subjective





- Subjective


Service Date: 18


Patient seen and examined:: without staff


Patient is:: awake, verbal, eyes closed, in bed, agitated, confused


Per staff patient has:: no adverse event





Internal Medicine Objective





- Results


Result Diagrams: 


 18 05:00





 18 05:00


Recent Labs: 


 Laboratory Last Values











WBC  7.6 Th/cmm (4.8-10.8)   18  05:00    


 


RBC  3.41 Mil/cmm (3.80-5.80)  L  18  05:00    


 


Hgb  10.8 gm/dL (12-16)  L  18  05:00    


 


Hct  31.8 % (41.0-60)  L  18  05:00    


 


MCV  93.3 fl (80-99)   18  05:00    


 


MCH  31.7 pg (27.0-31.0)  H  18  05:00    


 


MCHC Differential  33.9 pg (28.0-36.0)   18  05:00    


 


RDW  14.7 % (11.5-20.0)   18  05:00    


 


Plt Count  265 Th/cmm (150-400)  D 18  05:00    


 


MPV  6.8 fl  18  05:00    


 


Neutrophils %  68.5 % (40.0-80.0)   18  05:00    


 


Lymphocytes %  15.5 % (20.0-50.0)  L  18  05:00    


 


Monocytes %  13.1 % (2.0-10.0)  H  18  05:00    


 


Eosinophils %  2.0 % (0.0-5.0)   18  05:00    


 


Basophils %  0.9 % (0.0-2.0)   18  05:00    


 


Specimen Source  ARTERIAL   18  22:28    


 


Sample Site  Right Radial   18  22:28    


 


pH  7.38  (7.35-7.45)   18  22:28    


 


pCO2  45.0 mmHg (35.0-45.0)   18  22:28    


 


pO2  159.0 mmHg (80.0-100.0)  H  18  22:28    


 


HCO3  25.8 mEq/L (20.0-26.0)   18  22:28    


 


Base Excess  1.1 mEq/L (-3.0-3.0)   18  22:28    


 


O2 Saturation  99.0 % (92.0-100.0)   18  22:28    


 


Mata Test  Positive   18  22:28    


 


Vent Rate  N/A   18  22:28    


 


Inspired O2  100   18  22:28    


 


Tidal Volume  N/A   18  22:28    


 


PEEP  N/A   18  22:28    


 


Pressure (ins/psv/peep)  N/A   18  22:28    


 


Critical Value  MM,RCP   18  22:28    


 


Sodium  140 mEq/L (136-145)   18  05:00    


 


Potassium  3.6 mEq/L (3.5-5.1)   18  05:00    


 


Chloride  108 mEq/L ()  H  18  05:00    


 


Carbon Dioxide  26.9 mEq/L (21.0-31.0)   18  05:00    


 


Anion Gap  8.7  (7.0-16.0)   18  05:00    


 


BUN  7 mg/dL (7-25)   18  05:00    


 


Creatinine  0.6 mg/dL (0.7-1.3)  L  18  05:00    


 


Est GFR ( Amer)  > 60.0 ml/min (>90)   18  05:00    


 


Est GFR (Non-Af Amer)  > 60.0 ml/min  18  05:00    


 


BUN/Creatinine Ratio  11.7   18  05:00    


 


Glucose  87 mg/dL ()   18  05:00    


 


POC Glucose  137 MG/DL (70 - 105)  H  18  22:22    


 


Calcium  8.6 mg/dL (8.6-10.3)   18  05:00    


 


Total Bilirubin  0.3 mg/dL (0.3-1.0)   18  08:44    


 


AST  14 U/L (13-39)   18  08:44    


 


ALT  17 U/L (7-52)   18  08:44    


 


Alkaline Phosphatase  66 U/L ()   18  08:44    


 


Total Protein  6.1 gm/dL (6.0-8.3)   18  08:44    


 


Albumin  3.3 gm/dL (4.2-5.5)  L  18  08:44    


 


Globulin  2.8 gm/dL  18  08:44    


 


Albumin/Globulin Ratio  1.2  (1.0-1.8)   18  08:44    














- Physical Exam


Vitals and I&O: 


 Vital Signs











Temp  86.8 F   18 11:53


 


Pulse  81   18 11:53


 


Resp  18   18 11:53


 


BP  121/76   18 11:53


 


Pulse Ox  98   18 11:53








 Intake & Output











 18





 06:59 18:59 06:59


 


Intake Total 50 400 


 


Balance 50 400 


 


Weight (lbs)  64.41 kg 


 


Intake:   


 


  Intake, IV Amount 50  


 


    Piperacillin Sodium/ 50  





    Tazobact 3.375 gm In   





    Sodium Chloride 0.9% 50   





    ml @ 100 mls/hr IV Q8HR   





    [facility] Rx#:[id number]   


 


  Oral  400 


 


Other:   


 


  # Voids  3 


 


  # Bowel Movements  1 


 


  Stool Characteristics Soft Soft 


 


  Weight Source  Bedscale 











General: weak, lethargic, congested, thin


HEENT: NC/AT, PERRLA, EOMI, anicteric sclerae, throat clear


Neck: Supple, No JVD, No thyromegaly, No LAD


Lungs: congested, wheezing, ronchi


Cardiovascular: RRR, Normal S1, Normal S2


Abdomen: soft


Extremities: clear


Neurological: no change





Internal Medicine Assmt/Plan





- Assessment


Assessment: 


ALOC: multifactorial; observe closely.





PNA: improving.





Hypokalemia: supplemented.





s/p UGIB? H/H stable.





Psychosis: monitoring closely and adjust meds as needed.





COPD: RT protocol.





H/O A. Fib: rate controlled.





Noncompliance: education provided.





DVT prophylaxis.





Nutritional Asmnt/Malnutr-PDOC





- Dietary Evaluation


Malnutrition Findings (Please click <Entered> for more info): 








Nutritional Asmnt/Malnutrition                             Start:  18 10:

52


Text:                                                      Status: Complete    

  


Freq:                                                                          

  


Protocol:                                                                      

  


 Document     18 10:52  LCHENG  (Rec: 18 11:14  [facility]  RACHELLE-[email or web address])


 Nutritional Asmnt/Malnutrition


     Patient General Information


      Nutritional Screening                      Moderate Risk


      Diagnosis                                  hematemesis


      Pertinent Medical Hx/Surgical Hx           PNA, COPD, a fib, CAD, MI, BPH


                                                 , UTI, sepsis, anxiety,


                                                 depression, mild psychosis


      Subjective Information                     Pt seen sitting on bed having


                                                 lunch, consumed almost 100%.


                                                 Pt was not answering RD


                                                 greeting. Per EMR, PO intake


                                                 100%


      Current Diet Order/ Nutrition Support      [facility] soft chopped


      Pertinent Medications                      vit B12, D5-0.9%ns, colace,


                                                 culturelle, remeron, protonix,


                                                 piperacillin, nacl tab


      Pertinent Labs                              K 3.2, Cl 111, glucose


                                                 120, Ca 8.3


     Nutritional Hx/Data


      Height                                     1.75 m


      Height (Calculated Centimeters)            175.3


      Current Weight (lbs)                       61.689 kg


      Weight (Calculated Kilograms)              61.7


      Weight (Calculated Grams)                  86165.6


      Ideal Body Weight                          160


      Body Mass Index (BMI)                      20.0


     GI Symptoms


      GI Symptoms                                None


      Last BM                                    8/17 x 2


      Difficult in:                              None


      Skin Integrity/Comment:                    bruise reddened to right upper


                                                 back


      Current %PO                                Good (%)


     Estimated Nutritional Goals


      BEE in Kcals:                              Using Current wt


      Calories/Kcals/Kg                          25-30


      Kcals Calculated                           2671-0780


      Protein:                                   Using Current wt


      Protein g/k


      Protein Calculated                         62


      Fluid: ml                                  1550-1860ml (1ml/kcal)


     Nutritional Problem


      1. Problem


       Problem                                   altered nutrition related labs


       Etiology                                  electrolytes imbalance,


                                                 endocrine dysfunction


       Signs/Symptoms:                           K 3.2, Cl 111, glucose 120, Ca


                                                 8.3


     Malnutrition Alert


      Is there a minimum of two criteria         No


       selected?                                 


       Query Text:Check all the applicable       


       criteria. A minimum of two criteria are   


       recommended for diagnosis of either       


       severe or non-severe malnutrition.        


     Malnutrition Related to Morbid Obesity


      Malnutrition related to morbid obesity     No


     Intervention/Recommendation


      Comments                                   1. Continue with [facility] soft


                                                 chopped diet as ordered.


                                                 2. Monitor PO intake, wt, labs


                                                 and skin integrity


                                                 3. F/U as low risk in 7 days,


                                                 


     Expected Outcomes/Goals


      Expected Outcomes/Goals                    1. PO intake to meet at least


                                                 75% of nutritional needs.


                                                 2. Wt stability, skin to


                                                 remain intact, labs to


                                                 approach WNL.

## 2018-09-10 ENCOUNTER — HOSPITAL ENCOUNTER (INPATIENT)
Dept: HOSPITAL 36 - ER | Age: 68
LOS: 8 days | Discharge: NURSING, REHAB OR LONG-TERM CARE | DRG: 388 | End: 2018-09-18
Payer: MEDICARE

## 2018-09-10 VITALS — DIASTOLIC BLOOD PRESSURE: 70 MMHG | SYSTOLIC BLOOD PRESSURE: 116 MMHG

## 2018-09-10 DIAGNOSIS — I95.9: ICD-10-CM

## 2018-09-10 DIAGNOSIS — F31.9: ICD-10-CM

## 2018-09-10 DIAGNOSIS — E87.6: ICD-10-CM

## 2018-09-10 DIAGNOSIS — J44.9: ICD-10-CM

## 2018-09-10 DIAGNOSIS — R18.8: ICD-10-CM

## 2018-09-10 DIAGNOSIS — Z91.14: ICD-10-CM

## 2018-09-10 DIAGNOSIS — Z86.73: ICD-10-CM

## 2018-09-10 DIAGNOSIS — I10: ICD-10-CM

## 2018-09-10 DIAGNOSIS — N40.0: ICD-10-CM

## 2018-09-10 DIAGNOSIS — G92: ICD-10-CM

## 2018-09-10 DIAGNOSIS — I48.91: ICD-10-CM

## 2018-09-10 DIAGNOSIS — E86.0: ICD-10-CM

## 2018-09-10 DIAGNOSIS — K27.9: ICD-10-CM

## 2018-09-10 DIAGNOSIS — K56.7: Primary | ICD-10-CM

## 2018-09-10 DIAGNOSIS — F17.210: ICD-10-CM

## 2018-09-10 DIAGNOSIS — I25.10: ICD-10-CM

## 2018-09-10 DIAGNOSIS — K21.9: ICD-10-CM

## 2018-09-10 DIAGNOSIS — K56.41: ICD-10-CM

## 2018-09-10 DIAGNOSIS — E44.0: ICD-10-CM

## 2018-09-10 DIAGNOSIS — I25.2: ICD-10-CM

## 2018-09-10 LAB
ALBUMIN SERPL-MCNC: 3.2 GM/DL (ref 4.2–5.5)
ALBUMIN/GLOB SERPL: 1 {RATIO} (ref 1–1.8)
ALP SERPL-CCNC: 98 U/L (ref 34–104)
ALT SERPL-CCNC: 15 U/L (ref 7–52)
ANION GAP SERPL CALC-SCNC: 11.1 MMOL/L (ref 7–16)
AST SERPL-CCNC: 12 U/L (ref 13–39)
BASOPHILS # BLD AUTO: 0 TH/CUMM (ref 0–0.2)
BASOPHILS NFR BLD AUTO: 0 % (ref 0–2)
BILIRUB SERPL-MCNC: 0.6 MG/DL (ref 0.3–1)
BUN SERPL-MCNC: 39 MG/DL (ref 7–25)
CALCIUM SERPL-MCNC: 8.6 MG/DL (ref 8.6–10.3)
CHLORIDE SERPL-SCNC: 97 MEQ/L (ref 98–107)
CO2 SERPL-SCNC: 32.8 MEQ/L (ref 21–31)
CREAT SERPL-MCNC: 1 MG/DL (ref 0.7–1.3)
EOSINOPHIL # BLD AUTO: 0 TH/CMM (ref 0.1–0.4)
EOSINOPHIL NFR BLD AUTO: 0.3 % (ref 0–5)
ERYTHROCYTE [DISTWIDTH] IN BLOOD BY AUTOMATED COUNT: 15.5 % (ref 11.5–20)
GLOBULIN SER-MCNC: 3.3 GM/DL
GLUCOSE SERPL-MCNC: 112 MG/DL (ref 70–105)
HCT VFR BLD CALC: 38.7 % (ref 41–60)
HGB BLD-MCNC: 13.1 GM/DL (ref 12–16)
LYMPHOCYTE AB SER FC-ACNC: 1.1 TH/CMM (ref 1.5–3)
LYMPHOCYTES NFR BLD AUTO: 10.7 % (ref 20–50)
MCH RBC QN AUTO: 31.4 PG (ref 27–31)
MCHC RBC AUTO-ENTMCNC: 33.8 PG (ref 28–36)
MCV RBC AUTO: 93.1 FL (ref 80–99)
MONOCYTES # BLD AUTO: 1.3 TH/CMM (ref 0.3–1)
MONOCYTES NFR BLD AUTO: 11.9 % (ref 2–10)
NEUTROPHILS # BLD: 8.3 TH/CMM (ref 1.8–8)
NEUTROPHILS NFR BLD AUTO: 77.1 % (ref 40–80)
PLATELET # BLD: 329 TH/CMM (ref 150–400)
PMV BLD AUTO: 7.8 FL
POTASSIUM SERPL-SCNC: 2.9 MEQ/L (ref 3.5–5.1)
RBC # BLD AUTO: 4.16 MIL/CMM (ref 3.8–5.8)
SODIUM SERPL-SCNC: 138 MEQ/L (ref 136–145)
TROPONIN I SERPL-MCNC: < 0.01 NG/ML (ref 0.01–0.05)
WBC # BLD AUTO: 10.7 TH/CMM (ref 4.8–10.8)

## 2018-09-10 PROCEDURE — Z7610: HCPCS

## 2018-09-10 RX ADMIN — POTASSIUM CHLORIDE, DEXTROSE MONOHYDRATE AND SODIUM CHLORIDE SCH MLS/HR: 150; 5; 450 INJECTION, SOLUTION INTRAVENOUS at 09:08

## 2018-09-10 RX ADMIN — POTASSIUM CHLORIDE, DEXTROSE MONOHYDRATE AND SODIUM CHLORIDE SCH MLS/HR: 150; 5; 450 INJECTION, SOLUTION INTRAVENOUS at 21:23

## 2018-09-10 NOTE — DIAGNOSTIC IMAGING REPORT
Portable chest x-ray



HISTORY: Shortness of breath



There is a poor inspiration.  Allowing for this factor, no focal point

processes are seen.  The overall heart size appears normal. 

Atherosclerotic calcification seen in the aorta.



IMPRESSION:

1.  Allowing for a poor inspiration, no acute focal pulmonary processes

## 2018-09-10 NOTE — CONSULTATION
DATE OF CONSULTATION:  09/10/2018



INPATIENT GASTROINTESTINAL CONSULTATION



REFERRING PHYSICIAN:  Dr. Limon.



REASON FOR CONSULTATION:  Abdominal distention and constipation.



HISTORY OF PRESENT ILLNESS:  This is a 68-year-old male who was brought into the

hospital because of abdominal distention.  The patient had some bowel movements.

 He denies having abdominal pain.  Denies nausea, vomiting, hematemesis, or

coffee ground emesis.



PAST MEDICAL HISTORY:  Hypertension, peptic ulcer disease, and GERD.



PAST SURGICAL HISTORY:  None to add recently.



FAMILY HISTORY:  Noncontributory.



SOCIAL HISTORY:  Smokes tobacco.  Drinks alcohol.  No IV drug usage.



ALLERGIES:  None.



CURRENT MEDICATIONS:  Include Zosyn and IV fluids.



REVIEW OF SYSTEMS:  Ten point review of system was performed and the pertinent

positive was the abdominal distention.  All systems were otherwise negative.



PHYSICAL EXAMINATION:

VITAL SIGNS:  Temperature 98, breathing 16, pulse of 90, blood pressure 116/70,

and satting 94%.

GENERAL:  In no apparent distress.

EYES:  Anicteric.  Normal conjunctivae.

HEENT:  Normocephalic, atraumatic.  Moist mucous membranes.

NECK:  Soft, supple.

CHEST:  Clear.  No effort.

CARDIOVASCULAR:  Regular rate and rhythm.

ABDOMEN:  Soft, distended, and nontender.

SKIN:  Warm, dry.

EXTREMITIES:  Reveal no cyanosis.

PSYCHOLOGICAL:  Awake.



LABORATORY DATA:  Show white count 10.7, hemoglobin 13.1, and platelets of 329. 

Total bilirubin 0.6, AST 12, ALT 15, and alkaline phosphatase 98.  CT abdomen

and pelvis showed constipation, fecal impaction, possible ileus versus Ridgeland

syndrome.



IMPRESSION:  A 68-year-old male with abdominal distention, could have Ridgeland's

versus colonic ileus.  Mineral could be provided to see if this would help

facilitate bowel movements.  I did discuss with the patient options of

performing a colonoscopy, but he does not want one done.  He was made aware the

failure to have proper workup could result in a missed diagnosis as cancer,

missed cure and treatment opportunity resulting in early death and unforeseeable

disability.  He understands these things.



PLAN:

1.  A trial of mineral oil.

2.  Surgical evaluation.

3.  NG tube if needed.

4.  The patient refusing colonoscopy.



Thank you for allowing me to participate.  Please call me if any questions.





DD: 09/10/2018 15:59

DT: 09/10/2018 18:17

JOB# [id number]  [id number]

## 2018-09-10 NOTE — DIAGNOSTIC IMAGING REPORT
Exam: CT examination of the pelvis.



HISTORY: Febrile sinuses.



Total DLP equals 459



CTDI equals     8.1



Findings:



Multiple contiguous thin section of the abdomen pelvis obtained from

lower thorax to pubic symphysis without the administration of oral or

intravenous contrast material.  No prior studies available comparison.



The study demonstrates right basilar pneumonia with superimposed pleural

thickening.  There is significant distention of colon specifically in

the right hemicolon with air and fecal content.  Significant distention

small bowel loop seen with air-fluid level throughout no focal

transition point appreciated.  This might represent paralytic ileus. 

The liver and spleen displaced posteriorly due to distended the stomach

and large bowel.  There is no evidence of obstructive uropathy.  Mild

right-sided hydronephrosis is noted.  There is evidence for residual

contrast material in the lower colon.  The pancreas is not seen.  The

gallbladder is not seen.  There is no evidence for free fluid

collection.  There is no evidence of diverticular disease.  Aorta is

calcified.  Urinary bladder is intact.  Total right hip prosthesis is

noted.



The visualized bony structures demonstrate no evidence for lytic or

blastic lesions.



IMPRESSION:



Fecal impaction in the right colon with significant distention of the

right hemicolon and some small bowel loops suggestive of pelvic

ileus/Madelyn syndrome.



Distention small bowel loops with bowel thickening predominantly in the

left lower quadrant.  Clinical correlation recommended.

## 2018-09-10 NOTE — HISTORY & PHYSICAL
ADMIT DATE:  09/10/2018



CHIEF COMPLAINT:  Vomiting and abdominal distention.



HISTORY OF PRESENT ILLNESS:  The patient is a 68-year-old male admitted from the

Emergency Room into telemetry floor of [facility] due to

vomiting and abdominal pain with severe distention in the nursing home.  In the

Emergency Room, abdominal and pelvic CT scan revealed fecal impaction in the

right colon with significant distention of the right hemicolon and some small

bowel loops suggestive of pelvic ileus.  GI consultation requested and the

patient refused a colonoscopy or EGD or any procedure at this time.  He is a

kind of confused and noncompliant, but I did provide education.  Lab wise, the

patient's potassium is 2.9, BUN 39, creatinine 1.0.  Troponin less than 0.01. 

Blood culture and urine culture ordered.  White count is normal.  The patient

was kept n.p.o. due to ileus.  IV fluid was started.  Blood culture and urine

culture ordered, empiric antibiotic started, which will be adjusted accordingly.



PAST MEDICAL HISTORY:  COPD, pneumonia, coronary heart disease, status post MI,

atrial fibrillation, status post CVA, BPH, sepsis, urinary tract infection,

psychosis, bipolar disorder.



PAST SURGICAL HISTORY:  Denies significant past surgical history.



MEDICATIONS:  See medication reconciliation list.



ALLERGIES:  No known drug allergy.



FAMILY HISTORY:  Noncontributory.



SOCIAL HISTORY:  The patient smoked, quit years ago.  No history of alcohol or

IV drug use.



REVIEW OF SYSTEMS:  As per HPI.



PHYSICAL EXAMINATION:

GENERAL:  Well-developed, thin male in no acute distress.

SKIN:  Warm and dry.

VITAL SIGNS:  Basically stable except blood pressure low from time to time.

HEENT:  Normocephalic, atraumatic.  Pupils equal, round, react to light and

accommodation.

CHEST:  Symmetrical.

LUNGS:  Few wheezing appreciated.

CARDIAC:  Normal sinus rhythm.  S1, S2.

ABDOMEN:  Mildly distended with mild tenderness.  Bowel sounds are decreased.

EXTREMITIES:  No clubbing, cyanosis or edema bilaterally, 2+ equally.

NEUROLOGIC:  Unremarkable.



LABORATORY DATA:  Reviewed.



ASSESSMENT AND PLAN:

1.  Abdominal distention due to ileus:  The patient is kept n.p.o. and

intravenous fluids ordered.  Gastrointestinal consultation appreciated.  The

patient refused colonoscopy, which was recommended by Gastrointestinal

consultation and I did provide everything for the patient, he said he will think

about it.

2.  Hypokalemia:  Supplemented and IV fluid with 20 mEq KCl in there as well. 

This is mostly due to vomiting and that happened in the nursing home.

3.  Dehydration:  IV fluid.

4.  Hypotension:  Rule out sepsis:  Continue IV fluids and blood culture.  Urine

culture ordered.  Antibiotic will be adjusted accordingly.

5.  Fecal impaction:  Try mineral oil.

6.  Noncompliance:  Education provided.

7.  History of bipolar and psychosis:  Observe closely.  We will continue

medication.

8.  Deep venous thrombosis prophylaxis.





DD: 09/10/2018 21:40

DT: 09/10/2018 22:09

JOB# [id number]  [id number]

## 2018-09-10 NOTE — ED PHYSICIAN CHART
ED Chief Complaint/HPI





- Patient Information


Date Seen:: 09/10/18


Time Seen:: 04:52


Chief Complaint:: abd distention


History of Present Illness:: 





68 yr old male with hx of etoh abuse ascites with abd distension getting worse 

with vomiting abd pain denies bleeding or dark stools


Allergies:: 


 Allergies











Allergy/AdvReac Type Severity Reaction Status Date / Time


 


No Known Allergies Allergy   Verified 09/10/18 04:47











Vitals:: 


 Vital Signs - 8 hr











  09/10/18





  04:20


 


Temp 97.6 F


 


HR 96


 


RR 18


 


/70


 


O2 Sat % 89














ED Review of Systems





- Review of Systems


General/Constitutional: No fever


Skin: No skin lesions


Head: No headache


Eyes: No loss of vision


ENT: No earache


Neck: No neck pain


Cardio Vascular: No chest pain


Pulmonary: No SOB


GI: Vomiting, Pain, Constipation


Musculoskeletal: No bone or joint pain


Endocrine: No polyuria


Psychiatric: Prior psych history


Hematopoietic: Bruising


Allergic/Immuno: Urticaria


Neurological: No syncope





ED Past Medical History





- Past Medical History


Past Medical History: HTN, PUD/GERD


Family History: Heart disease


Social History: Smoker, Alcohol





Family Medical History





- Family Member


  ** Mother


History Unknown: Yes


Ethnicity: Unknown


Living Status: Unknown





ED Physical Exam





- Physical Examination


General/Constitutional: Alert


Head: Atraumatic


Other Skin comments:: 





skin yellow tinged


Neck: Nontender


Respiratory: Nl effort/Exclusion


Cardio Vascular: No murmur, gallop, rubs (ascites distension abd tenderness)


Other Cardio Vascular comments:: 





irregularly irregular





ED Assessment





- Assessment


General Assessment: 





ascites





ED Septic Shock





- .


Is Septic Shock (SBP<90, OR Lactate>4 mmol\L) present?: No





- <6hrs of presentation:


Vital Signs: 


 Vital Signs - 8 hr











  09/10/18





  04:20


 


Temp 97.6 F


 


HR 96


 


RR 18


 


/70


 


O2 Sat % 89














ED Reassessment (Disposition)





- Diagnosis


Diagnosis:: 





ascites vomiting ct abd pelvis 





- Patient Disposition


Discharge/Transfer:: Acute Care w/in this hosp

## 2018-09-11 LAB
APPEARANCE UR: CLEAR
BACTERIA #/AREA URNS HPF: (no result) /HPF
BILIRUB UR-MCNC: NEGATIVE MG/DL
COLOR UR: YELLOW
EPI CELLS URNS QL MICRO: (no result) /LPF
GLUCOSE UR STRIP-MCNC: NEGATIVE MG/DL
KETONES UR STRIP-MCNC: (no result) MG/DL
LEUKOCYTE ESTERASE UR-ACNC: NEGATIVE
MICRO URNS: YES
NITRITE UR QL STRIP: NEGATIVE
PH UR STRIP: 6 [PH] (ref 4.6–8)
PROT UR STRIP-MCNC: (no result) MG/DL
RBC # UR STRIP: NEGATIVE /UL
RBC #/AREA URNS HPF: (no result) /HPF (ref 0–5)
SP GR UR STRIP: 1.02 (ref 1–1.03)
URINALYSIS COMPLETE PNL UR: (no result)
UROBILINOGEN UR STRIP-ACNC: 1 E.U./DL (ref 0.2–1)
WBC #/AREA URNS HPF: (no result) /HPF (ref 0–5)

## 2018-09-11 RX ADMIN — MAGNESIUM HYDROXIDE SCH: 400 SUSPENSION ORAL at 01:28

## 2018-09-11 RX ADMIN — POTASSIUM CHLORIDE, DEXTROSE MONOHYDRATE AND SODIUM CHLORIDE SCH MLS/HR: 150; 5; 450 INJECTION, SOLUTION INTRAVENOUS at 22:06

## 2018-09-11 RX ADMIN — MAGNESIUM HYDROXIDE SCH ML: 400 SUSPENSION ORAL at 00:50

## 2018-09-11 RX ADMIN — POTASSIUM CHLORIDE, DEXTROSE MONOHYDRATE AND SODIUM CHLORIDE SCH MLS/HR: 150; 5; 450 INJECTION, SOLUTION INTRAVENOUS at 09:36

## 2018-09-11 RX ADMIN — Medication SCH: at 09:41

## 2018-09-11 RX ADMIN — PANTOPRAZOLE SODIUM SCH: 40 TABLET, DELAYED RELEASE ORAL at 09:42

## 2018-09-11 NOTE — GI PROGRESS NOTE
Subjective





- Review of Systems


Subjective: 





NO EVENTS


DENIES ABD PAIN





Objective





- Results


Result Diagrams: 


 09/10/18 05:10





 09/10/18 05:10


Recent Labs: 


 Laboratory Last Values











WBC  10.7 Th/cmm (4.8-10.8)   09/10/18  05:10    


 


RBC  4.16 Mil/cmm (3.80-5.80)   09/10/18  05:10    


 


Hgb  13.1 gm/dL (12-16)   09/10/18  05:10    


 


Hct  38.7 % (41.0-60)  L  09/10/18  05:10    


 


MCV  93.1 fl (80-99)   09/10/18  05:10    


 


MCH  31.4 pg (27.0-31.0)  H  09/10/18  05:10    


 


MCHC Differential  33.8 pg (28.0-36.0)   09/10/18  05:10    


 


RDW  15.5 % (11.5-20.0)   09/10/18  05:10    


 


Plt Count  329 Th/cmm (150-400)   09/10/18  05:10    


 


MPV  7.8 fl  09/10/18  05:10    


 


Neutrophils %  77.1 % (40.0-80.0)   09/10/18  05:10    


 


Lymphocytes %  10.7 % (20.0-50.0)  L  09/10/18  05:10    


 


Monocytes %  11.9 % (2.0-10.0)  H  09/10/18  05:10    


 


Eosinophils %  0.3 % (0.0-5.0)   09/10/18  05:10    


 


Basophils %  0.0 % (0.0-2.0)   09/10/18  05:10    


 


Sodium  138 mEq/L (136-145)   09/10/18  05:10    


 


Potassium  2.9 mEq/L (3.5-5.1)  L*  09/10/18  05:10    


 


Chloride  97 mEq/L ()  L  09/10/18  05:10    


 


Carbon Dioxide  32.8 mEq/L (21.0-31.0)  H  09/10/18  05:10    


 


Anion Gap  11.1  (7.0-16.0)   09/10/18  05:10    


 


BUN  39 mg/dL (7-25)  H  09/10/18  05:10    


 


Creatinine  1.0 mg/dL (0.7-1.3)   09/10/18  05:10    


 


Est GFR ( Amer)  > 60.0 ml/min (>90)   09/10/18  05:10    


 


Est GFR (Non-Af Amer)  > 60.0 ml/min  09/10/18  05:10    


 


BUN/Creatinine Ratio  39.0   09/10/18  05:10    


 


Glucose  112 mg/dL ()  H  09/10/18  05:10    


 


Whole Bld Lactic Acid  1.26 mmol/L (0.60-1.99)   09/10/18  05:10    


 


Calcium  8.6 mg/dL (8.6-10.3)   09/10/18  05:10    


 


Total Bilirubin  0.6 mg/dL (0.3-1.0)   09/10/18  05:10    


 


AST  12 U/L (13-39)  L  09/10/18  05:10    


 


ALT  15 U/L (7-52)   09/10/18  05:10    


 


Alkaline Phosphatase  98 U/L ()   09/10/18  05:10    


 


Ammonia  88 umol/L (16-53)  H  09/10/18  05:10    


 


Troponin I  < 0.01 ng/mL (0.01-0.05)  L  09/10/18  05:10    


 


Total Protein  6.5 gm/dL (6.0-8.3)   09/10/18  05:10    


 


Albumin  3.2 gm/dL (4.2-5.5)  L  09/10/18  05:10    


 


Globulin  3.3 gm/dL  09/10/18  05:10    


 


Albumin/Globulin Ratio  1.0  (1.0-1.8)   09/10/18  05:10    


 


Urine Source  CLEAN C   09/11/18  06:57    


 


Urine Color  YELLOW   09/11/18  06:57    


 


Urine Clarity  CLEAR  (CLEAR)   09/11/18  06:57    


 


Urine pH  6.0  (4.6 - 8.0)   09/11/18  06:57    


 


Ur Specific Gravity  1.025  (1.005-1.030)   09/11/18  06:57    


 


Urine Protein  TRACE mg/dL (NEGATIVE)   09/11/18  06:57    


 


Urine Glucose (UA)  NEGATIVE mg/dL (NEGATIVE)   09/11/18  06:57    


 


Urine Ketones  TRACE mg/dL (NEGATIVE)   09/11/18  06:57    


 


Urine Blood  NEGATIVE  (NEGATIVE)   09/11/18  06:57    


 


Urine Nitrate  NEGATIVE  (NEGATIVE)   09/11/18  06:57    


 


Urine Bilirubin  NEGATIVE  (NEGATIVE)   09/11/18  06:57    


 


Urine Urobilinogen  1.0 E.U./dL (0.2 - 1.0)   09/11/18  06:57    


 


Ur Leukocyte Esterase  NEGATIVE  (NEGATIVE)   09/11/18  06:57    


 


Urine RBC  0-2 /hpf (0-5)  H  09/11/18  06:57    


 


Urine WBC  0-2 /hpf (0-5)   09/11/18  06:57    


 


Ur Epithelial Cells  OCCASIONAL /lpf (FEW)   09/11/18  06:57    


 


Uric Acid Crystals  FEW /hpf (NONE SEEN)   09/11/18  06:57    


 


Urine Bacteria  NONE SEEN /hpf (NONE SEEN)   09/11/18  06:57    














- Physical Exam


Vitals and I&O: 


 Vital Signs











Temp  98.6 F   09/11/18 12:00


 


Pulse  91   09/11/18 12:00


 


Resp  18   09/11/18 12:00


 


BP  110/69   09/11/18 12:00


 


Pulse Ox  95   09/11/18 12:00








 Intake & Output











 09/10/18 09/11/18 09/11/18





 18:59 06:59 18:59


 


Intake Total 50 1250 1000


 


Balance 50 1250 1000


 


Weight (lbs) 58.967 kg 58.967 kg 


 


Intake:   


 


  Intake, IV Amount 50 1050 1000


 


    D5-0.45NS w/20 mEq KCL 1,  1000 1000





    000 ml @ 90 mls/hr IV .   





    Q11H7M [facility] Rx#:[id number]   


 


    Piperacillin Sodium/ 50 50 





    Tazobact 3.375 gm In   





    Sodium Chloride 0.9% 50   





    ml @ 100 mls/hr IV Q8H   





    [facility] Rx#:[id number]   


 


  Oral 0 200 


 


Other:   


 


  # Voids 3 3 


 


  Stool Characteristics   Soft


 


  Weight Source Bedscale Bedscale 











Active Medications: 


Current Medications





Amiodarone HCl (Cordarone)  200 mg PO DAILY [facility]


   Stop: 11/10/18 08:59


   Last Admin: 09/11/18 09:41 Dose:  Not Given


Cyanocobalamin (Vitamin B12)  500 mcg PO DAILY [facility]


   Stop: 11/10/18 08:59


   Last Admin: 09/11/18 09:41 Dose:  Not Given


Docusate Sodium (Colace)  250 mg PO HS [facility]


   Stop: 11/10/18 20:59


Gabapentin (Neurontin)  300 mg PO TID [facility]


   Stop: 11/10/18 08:59


   Last Admin: 09/11/18 09:41 Dose:  Not Given


Potassium Chloride/Dextrose/Sod Cl (D5-0.45ns W/20 Meq Kcl)  1,000 mls @ 90 mls/

hr IV .Q11H7M [facility]


   Stop: 11/09/18 09:06


   Last Admin: 09/11/18 09:36 Dose:  90 mls/hr


Piperacillin Sod/Tazobactam (Sod 3.375 gm/ Sodium Chloride)  50 mls @ 100 mls/

hr IV Q8H [facility]


   Stop: 11/09/18 16:59


   Last Admin: 09/11/18 09:36 Dose:  100 mls/hr


Lactobacillus Rhamnosus (Culturelle 15b)  1 each PO DAILY [facility]


   Stop: 11/10/18 08:59


   Last Admin: 09/11/18 09:41 Dose:  Not Given


Lorazepam (Ativan)  0.5 mg PO Q8HR [facility]; Protocol


   Stop: 11/10/18 04:59


Magnesium Hydroxide (Milk Of Magnesia)  30 ml PO Q48HR [facility]


   Stop: 11/09/18 21:59


   Last Admin: 09/11/18 01:28 Dose:  Not Given


Mineral Oil (Mineral Oil 30 Ml)  30 ml PO BID [facility]


   Stop: 09/16/18 16:59


   Last Admin: 09/11/18 09:43 Dose:  30 ml


Mirtazapine (Remeron)  15 mg PO HS [facility]; Protocol


   Stop: 11/10/18 20:59


Olanzapine (Zyprexa)  5 mg PO DAILY [facility]; Protocol


   Stop: 11/10/18 08:59


Olanzapine (Zyprexa)   mg PO HS [facility]; Protocol


   Stop: 11/10/18 20:59


Ondansetron HCl (Zofran Odt)  4 mg PO Q6HR PRN


   PRN Reason: Nausea / Vomiting


   Stop: 11/10/18 00:44


Oxybutynin Chloride (Ditropan)  5 mg PO HS [facility]


   Stop: 11/10/18 20:59


Pantoprazole Sodium (Protonix)  40 mg PO DAILY [facility]


   Stop: 11/10/18 08:59


   Last Admin: 09/11/18 09:42 Dose:  Not Given


Sodium Chloride (Nacl Tab)  1 gm PO BID [facility]


   Stop: 11/10/18 08:59


   Last Admin: 09/11/18 09:42 Dose:  Not Given


Tamsulosin HCl (Flomax)  0.4 mg PO DAILY [facility]


   Stop: 11/10/18 08:59


   Last Admin: 09/11/18 09:42 Dose:  Not Given











Assessment/Plan





- Assessment


Assessment: 





69 YO MALE WITH ABD DISTENSION


LIKELY FECAL IMPACTION





1.CONT LAXATIVES


2.PT REFUSING COLO


3.WILL NEED BARIUM ENEMA AS ALTERNATIVE

## 2018-09-11 NOTE — INTERNAL MEDICINE PROG NOTE
Internal Medicine Subjective





- Subjective


Service Date: 09/11/18


Patient seen and examined:: without staff


Patient is:: asleep, non-interactive, in bed


Per staff patient has:: no adverse event





Internal Medicine Objective





- Results


Result Diagrams: 


 09/10/18 05:10





 09/10/18 05:10


Recent Labs: 


 Laboratory Last Values











WBC  10.7 Th/cmm (4.8-10.8)   09/10/18  05:10    


 


RBC  4.16 Mil/cmm (3.80-5.80)   09/10/18  05:10    


 


Hgb  13.1 gm/dL (12-16)   09/10/18  05:10    


 


Hct  38.7 % (41.0-60)  L  09/10/18  05:10    


 


MCV  93.1 fl (80-99)   09/10/18  05:10    


 


MCH  31.4 pg (27.0-31.0)  H  09/10/18  05:10    


 


MCHC Differential  33.8 pg (28.0-36.0)   09/10/18  05:10    


 


RDW  15.5 % (11.5-20.0)   09/10/18  05:10    


 


Plt Count  329 Th/cmm (150-400)   09/10/18  05:10    


 


MPV  7.8 fl  09/10/18  05:10    


 


Neutrophils %  77.1 % (40.0-80.0)   09/10/18  05:10    


 


Lymphocytes %  10.7 % (20.0-50.0)  L  09/10/18  05:10    


 


Monocytes %  11.9 % (2.0-10.0)  H  09/10/18  05:10    


 


Eosinophils %  0.3 % (0.0-5.0)   09/10/18  05:10    


 


Basophils %  0.0 % (0.0-2.0)   09/10/18  05:10    


 


Sodium  138 mEq/L (136-145)   09/10/18  05:10    


 


Potassium  2.9 mEq/L (3.5-5.1)  L*  09/10/18  05:10    


 


Chloride  97 mEq/L ()  L  09/10/18  05:10    


 


Carbon Dioxide  32.8 mEq/L (21.0-31.0)  H  09/10/18  05:10    


 


Anion Gap  11.1  (7.0-16.0)   09/10/18  05:10    


 


BUN  39 mg/dL (7-25)  H  09/10/18  05:10    


 


Creatinine  1.0 mg/dL (0.7-1.3)   09/10/18  05:10    


 


Est GFR ( Amer)  > 60.0 ml/min (>90)   09/10/18  05:10    


 


Est GFR (Non-Af Amer)  > 60.0 ml/min  09/10/18  05:10    


 


BUN/Creatinine Ratio  39.0   09/10/18  05:10    


 


Glucose  112 mg/dL ()  H  09/10/18  05:10    


 


Whole Bld Lactic Acid  1.26 mmol/L (0.60-1.99)   09/10/18  05:10    


 


Calcium  8.6 mg/dL (8.6-10.3)   09/10/18  05:10    


 


Total Bilirubin  0.6 mg/dL (0.3-1.0)   09/10/18  05:10    


 


AST  12 U/L (13-39)  L  09/10/18  05:10    


 


ALT  15 U/L (7-52)   09/10/18  05:10    


 


Alkaline Phosphatase  98 U/L ()   09/10/18  05:10    


 


Ammonia  88 umol/L (16-53)  H  09/10/18  05:10    


 


Troponin I  < 0.01 ng/mL (0.01-0.05)  L  09/10/18  05:10    


 


Total Protein  6.5 gm/dL (6.0-8.3)   09/10/18  05:10    


 


Albumin  3.2 gm/dL (4.2-5.5)  L  09/10/18  05:10    


 


Globulin  3.3 gm/dL  09/10/18  05:10    


 


Albumin/Globulin Ratio  1.0  (1.0-1.8)   09/10/18  05:10    


 


Urine Source  CLEAN C   09/11/18  06:57    


 


Urine Color  YELLOW   09/11/18  06:57    


 


Urine Clarity  CLEAR  (CLEAR)   09/11/18  06:57    


 


Urine pH  6.0  (4.6 - 8.0)   09/11/18  06:57    


 


Ur Specific Gravity  1.025  (1.005-1.030)   09/11/18  06:57    


 


Urine Protein  TRACE mg/dL (NEGATIVE)   09/11/18  06:57    


 


Urine Glucose (UA)  NEGATIVE mg/dL (NEGATIVE)   09/11/18  06:57    


 


Urine Ketones  TRACE mg/dL (NEGATIVE)   09/11/18  06:57    


 


Urine Blood  NEGATIVE  (NEGATIVE)   09/11/18  06:57    


 


Urine Nitrate  NEGATIVE  (NEGATIVE)   09/11/18  06:57    


 


Urine Bilirubin  NEGATIVE  (NEGATIVE)   09/11/18  06:57    


 


Urine Urobilinogen  1.0 E.U./dL (0.2 - 1.0)   09/11/18  06:57    


 


Ur Leukocyte Esterase  NEGATIVE  (NEGATIVE)   09/11/18  06:57    


 


Urine RBC  0-2 /hpf (0-5)  H  09/11/18  06:57    


 


Urine WBC  0-2 /hpf (0-5)   09/11/18  06:57    


 


Ur Epithelial Cells  OCCASIONAL /lpf (FEW)   09/11/18  06:57    


 


Uric Acid Crystals  FEW /hpf (NONE SEEN)   09/11/18  06:57    


 


Urine Bacteria  NONE SEEN /hpf (NONE SEEN)   09/11/18  06:57    














- Physical Exam


Vitals and I&O: 


 Vital Signs











Temp  98.6 F   09/11/18 20:00


 


Pulse  84   09/11/18 20:00


 


Resp  17   09/11/18 20:00


 


BP  109/68   09/11/18 20:00


 


Pulse Ox  96   09/11/18 20:00








 Intake & Output











 09/11/18 09/11/18 09/12/18





 06:59 18:59 06:59


 


Intake Total 1250 1150 


 


Balance 1250 1150 


 


Weight (lbs) 58.967 kg 58.967 kg 58.967 kg


 


Intake:   


 


  Intake, IV Amount 1050 1050 


 


    D5-0.45NS w/20 mEq KCL 1, 1000 1000 





    000 ml @ 90 mls/hr IV .   





    Q11H7M [facility] Rx#:[id number]   


 


    Piperacillin Sodium/ 50 50 





    Tazobact 3.375 gm In   





    Sodium Chloride 0.9% 50   





    ml @ 100 mls/hr IV Q8H   





    [facility] Rx#:[id number]   


 


  Oral 200 100 


 


Other:   


 


  # Voids 3 3 


 


  # Bowel Movements  0 


 


  Stool Characteristics  Soft 


 


  Weight Source Bedscale Bedscale Bedscale











Active Medications: 


Current Medications





Amiodarone HCl (Cordarone)  200 mg PO DAILY [facility]


   Stop: 11/10/18 08:59


   Last Admin: 09/11/18 09:41 Dose:  Not Given


Cyanocobalamin (Vitamin B12)  500 mcg PO DAILY [facility]


   Stop: 11/10/18 08:59


   Last Admin: 09/11/18 09:41 Dose:  Not Given


Docusate Sodium (Colace)  250 mg PO HS [facility]


   Stop: 11/10/18 20:59


   Last Admin: 09/11/18 20:42 Dose:  Not Given


Gabapentin (Neurontin)  300 mg PO TID [facility]


   Stop: 11/10/18 08:59


   Last Admin: 09/11/18 20:42 Dose:  Not Given


Potassium Chloride/Dextrose/Sod Cl (D5-0.45ns W/20 Meq Kcl)  1,000 mls @ 90 mls/

hr IV .Q11H7M [facility]


   Stop: 11/09/18 09:06


   Last Admin: 09/11/18 09:36 Dose:  90 mls/hr


Piperacillin Sod/Tazobactam (Sod 3.375 gm/ Sodium Chloride)  50 mls @ 100 mls/

hr IV Q8H [facility]


   Stop: 11/09/18 16:59


   Last Admin: 09/11/18 16:00 Dose:  100 mls/hr


Lactobacillus Rhamnosus (Culturelle 15b)  1 each PO DAILY [facility]


   Stop: 11/10/18 08:59


   Last Admin: 09/11/18 09:41 Dose:  Not Given


Lorazepam (Ativan)  0.5 mg PO Q8HR [facility]; Protocol


   Stop: 11/10/18 04:59


Magnesium Hydroxide (Milk Of Magnesia)  30 ml PO Q48HR [facility]


   Stop: 11/09/18 21:59


   Last Admin: 09/11/18 01:28 Dose:  Not Given


Mineral Oil (Mineral Oil 30 Ml)  30 ml PO BID [facility]


   Stop: 09/16/18 16:59


   Last Admin: 09/11/18 16:37 Dose:  30 ml


Mirtazapine (Remeron)  15 mg PO HS [facility]; Protocol


   Stop: 11/10/18 20:59


Olanzapine (Zyprexa)  5 mg PO DAILY [facility]; Protocol


   Stop: 11/10/18 08:59


Olanzapine (Zyprexa)  10 mg PO HS [facility]; Protocol


   Stop: 11/10/18 20:59


Ondansetron HCl (Zofran Odt)  4 mg PO Q6HR PRN


   PRN Reason: Nausea / Vomiting


   Stop: 11/10/18 00:44


Oxybutynin Chloride (Ditropan)  5 mg PO HS [facility]


   Stop: 11/10/18 20:59


   Last Admin: 09/11/18 20:42 Dose:  Not Given


Pantoprazole Sodium (Protonix)  40 mg PO DAILY [facility]


   Stop: 11/10/18 08:59


   Last Admin: 09/11/18 09:42 Dose:  Not Given


Sodium Chloride (Nacl Tab)  1 gm PO BID [facility]


   Stop: 11/10/18 08:59


   Last Admin: 09/11/18 16:37 Dose:  Not Given


Tamsulosin HCl (Flomax)  0.4 mg PO DAILY [facility]


   Stop: 11/10/18 08:59


   Last Admin: 09/11/18 09:42 Dose:  Not Given








General: weak, congested, demented


HEENT: NC/AT, PERRLA


Neck: Supple, No JVD, No thyromegaly, No LAD


Lungs: wheezing, ronchi


Cardiovascular: RRR, Normal S1


Abdomen: non-tender, distended


Extremities: clear


Neurological: no change





Internal Medicine Assmt/Plan





- Assessment


Assessment: 





ALOC: on and off; observe.





Ileus: continue laxatives.





Hypokalemia: supplement.





Noncompliance: education provided.





H/O A. Fib: rate controlled.





Hypotension: better.

## 2018-09-12 LAB
ANION GAP SERPL CALC-SCNC: 10.6 MMOL/L (ref 7–16)
BASOPHILS # BLD AUTO: 0 TH/CUMM (ref 0–0.2)
BASOPHILS NFR BLD AUTO: 0.2 % (ref 0–2)
BUN SERPL-MCNC: 21 MG/DL (ref 7–25)
CALCIUM SERPL-MCNC: 8.4 MG/DL (ref 8.6–10.3)
CHLORIDE SERPL-SCNC: 103 MEQ/L (ref 98–107)
CO2 SERPL-SCNC: 26.4 MEQ/L (ref 21–31)
CREAT SERPL-MCNC: 0.7 MG/DL (ref 0.7–1.3)
EOSINOPHIL # BLD AUTO: 0.1 TH/CMM (ref 0.1–0.4)
EOSINOPHIL NFR BLD AUTO: 0.7 % (ref 0–5)
ERYTHROCYTE [DISTWIDTH] IN BLOOD BY AUTOMATED COUNT: 15.6 % (ref 11.5–20)
GLUCOSE SERPL-MCNC: 81 MG/DL (ref 70–105)
HCT VFR BLD CALC: 36.1 % (ref 41–60)
HGB BLD-MCNC: 12.2 GM/DL (ref 12–16)
LYMPHOCYTE AB SER FC-ACNC: 1 TH/CMM (ref 1.5–3)
LYMPHOCYTES NFR BLD AUTO: 11.3 % (ref 20–50)
MCH RBC QN AUTO: 31.7 PG (ref 27–31)
MCHC RBC AUTO-ENTMCNC: 33.8 PG (ref 28–36)
MCV RBC AUTO: 93.7 FL (ref 80–99)
MONOCYTES # BLD AUTO: 1.2 TH/CMM (ref 0.3–1)
MONOCYTES NFR BLD AUTO: 13.9 % (ref 2–10)
NEUTROPHILS # BLD: 6.2 TH/CMM (ref 1.8–8)
NEUTROPHILS NFR BLD AUTO: 73.9 % (ref 40–80)
PLATELET # BLD: 252 TH/CMM (ref 150–400)
PMV BLD AUTO: 8.1 FL
POTASSIUM SERPL-SCNC: 3 MEQ/L (ref 3.5–5.1)
RBC # BLD AUTO: 3.85 MIL/CMM (ref 3.8–5.8)
SODIUM SERPL-SCNC: 137 MEQ/L (ref 136–145)
WBC # BLD AUTO: 8.5 TH/CMM (ref 4.8–10.8)

## 2018-09-12 RX ADMIN — POTASSIUM CHLORIDE, DEXTROSE MONOHYDRATE AND SODIUM CHLORIDE SCH MLS/HR: 150; 5; 450 INJECTION, SOLUTION INTRAVENOUS at 22:26

## 2018-09-12 RX ADMIN — POTASSIUM CHLORIDE SCH MLS/HR: 14.9 INJECTION, SOLUTION INTRAVENOUS at 14:16

## 2018-09-12 RX ADMIN — Medication SCH EACH: at 09:00

## 2018-09-12 RX ADMIN — MAGNESIUM HYDROXIDE SCH ML: 400 SUSPENSION ORAL at 22:20

## 2018-09-12 RX ADMIN — POTASSIUM CHLORIDE SCH MLS/HR: 14.9 INJECTION, SOLUTION INTRAVENOUS at 10:12

## 2018-09-12 RX ADMIN — PANTOPRAZOLE SODIUM SCH MG: 40 TABLET, DELAYED RELEASE ORAL at 10:10

## 2018-09-12 NOTE — GI PROGRESS NOTE
Subjective





- Review of Systems


Subjective: 





NO EVENTS


DENIES ABD PAIN


HAD BM





Objective





- Results


Result Diagrams: 


 09/12/18 05:40





 09/12/18 05:40


Recent Labs: 


 Laboratory Last Values











WBC  8.5 Th/cmm (4.8-10.8)   09/12/18  05:40    


 


RBC  3.85 Mil/cmm (3.80-5.80)   09/12/18  05:40    


 


Hgb  12.2 gm/dL (12-16)   09/12/18  05:40    


 


Hct  36.1 % (41.0-60)  L  09/12/18  05:40    


 


MCV  93.7 fl (80-99)   09/12/18  05:40    


 


MCH  31.7 pg (27.0-31.0)  H  09/12/18  05:40    


 


MCHC Differential  33.8 pg (28.0-36.0)   09/12/18  05:40    


 


RDW  15.6 % (11.5-20.0)   09/12/18  05:40    


 


Plt Count  252 Th/cmm (150-400)   09/12/18  05:40    


 


MPV  8.1 fl  09/12/18  05:40    


 


Neutrophils %  73.9 % (40.0-80.0)   09/12/18  05:40    


 


Lymphocytes %  11.3 % (20.0-50.0)  L  09/12/18  05:40    


 


Monocytes %  13.9 % (2.0-10.0)  H  09/12/18  05:40    


 


Eosinophils %  0.7 % (0.0-5.0)   09/12/18  05:40    


 


Basophils %  0.2 % (0.0-2.0)   09/12/18  05:40    


 


Sodium  137 mEq/L (136-145)   09/12/18  05:40    


 


Potassium  3.0 mEq/L (3.5-5.1)  L  09/12/18  05:40    


 


Chloride  103 mEq/L ()   09/12/18  05:40    


 


Carbon Dioxide  26.4 mEq/L (21.0-31.0)   09/12/18  05:40    


 


Anion Gap  10.6  (7.0-16.0)   09/12/18  05:40    


 


BUN  21 mg/dL (7-25)   09/12/18  05:40    


 


Creatinine  0.7 mg/dL (0.7-1.3)   09/12/18  05:40    


 


Est GFR ( Amer)  > 60.0 ml/min (>90)   09/12/18  05:40    


 


Est GFR (Non-Af Amer)  > 60.0 ml/min  09/12/18  05:40    


 


BUN/Creatinine Ratio  30.0   09/12/18  05:40    


 


Glucose  81 mg/dL ()   09/12/18  05:40    


 


Whole Bld Lactic Acid  1.26 mmol/L (0.60-1.99)   09/10/18  05:10    


 


Calcium  8.4 mg/dL (8.6-10.3)  L  09/12/18  05:40    


 


Total Bilirubin  0.6 mg/dL (0.3-1.0)   09/10/18  05:10    


 


AST  12 U/L (13-39)  L  09/10/18  05:10    


 


ALT  15 U/L (7-52)   09/10/18  05:10    


 


Alkaline Phosphatase  98 U/L ()   09/10/18  05:10    


 


Ammonia  88 umol/L (16-53)  H  09/10/18  05:10    


 


Troponin I  < 0.01 ng/mL (0.01-0.05)  L  09/10/18  05:10    


 


Total Protein  6.5 gm/dL (6.0-8.3)   09/10/18  05:10    


 


Albumin  3.2 gm/dL (4.2-5.5)  L  09/10/18  05:10    


 


Globulin  3.3 gm/dL  09/10/18  05:10    


 


Albumin/Globulin Ratio  1.0  (1.0-1.8)   09/10/18  05:10    


 


Urine Source  CLEAN C   09/11/18  06:57    


 


Urine Color  YELLOW   09/11/18  06:57    


 


Urine Clarity  CLEAR  (CLEAR)   09/11/18  06:57    


 


Urine pH  6.0  (4.6 - 8.0)   09/11/18  06:57    


 


Ur Specific Gravity  1.025  (1.005-1.030)   09/11/18  06:57    


 


Urine Protein  TRACE mg/dL (NEGATIVE)   09/11/18  06:57    


 


Urine Glucose (UA)  NEGATIVE mg/dL (NEGATIVE)   09/11/18  06:57    


 


Urine Ketones  TRACE mg/dL (NEGATIVE)   09/11/18  06:57    


 


Urine Blood  NEGATIVE  (NEGATIVE)   09/11/18  06:57    


 


Urine Nitrate  NEGATIVE  (NEGATIVE)   09/11/18  06:57    


 


Urine Bilirubin  NEGATIVE  (NEGATIVE)   09/11/18  06:57    


 


Urine Urobilinogen  1.0 E.U./dL (0.2 - 1.0)   09/11/18  06:57    


 


Ur Leukocyte Esterase  NEGATIVE  (NEGATIVE)   09/11/18  06:57    


 


Urine RBC  0-2 /hpf (0-5)  H  09/11/18  06:57    


 


Urine WBC  0-2 /hpf (0-5)   09/11/18  06:57    


 


Ur Epithelial Cells  OCCASIONAL /lpf (FEW)   09/11/18  06:57    


 


Uric Acid Crystals  FEW /hpf (NONE SEEN)   09/11/18  06:57    


 


Urine Bacteria  NONE SEEN /hpf (NONE SEEN)   09/11/18  06:57    














- Physical Exam


Vitals and I&O: 


 Vital Signs











Temp  98.7 F   09/12/18 04:00


 


Pulse  87   09/12/18 04:00


 


Resp  17   09/12/18 04:00


 


BP  112/68   09/12/18 04:00


 


Pulse Ox  97   09/12/18 04:00








 Intake & Output











 09/11/18 09/12/18 09/12/18





 18:59 06:59 18:59


 


Intake Total 1150 1060 


 


Balance 1150 1060 


 


Weight (lbs) 58.967 kg 58.967 kg 


 


Intake:   


 


  Intake, IV Amount 1050 1000 


 


    D5-0.45NS w/20 mEq KCL 1, 1000 1000 





    000 ml @ 90 mls/hr IV .   





    Q11H7M [facility] Rx#:[id number]   


 


    Piperacillin Sodium/ 50  





    Tazobact 3.375 gm In   





    Sodium Chloride 0.9% 50   





    ml @ 100 mls/hr IV Q8H   





    [facility] Rx#:[id number]   


 


  Oral 100 60 


 


Other:   


 


  # Voids 3 3 


 


  # Bowel Movements 0  


 


  Stool Characteristics Soft  


 


  Weight Source Bedscale Bedscale 











Active Medications: 


Current Medications





Amiodarone HCl (Cordarone)  200 mg PO DAILY [facility]


   Stop: 11/10/18 08:59


   Last Admin: 09/11/18 09:41 Dose:  Not Given


Cyanocobalamin (Vitamin B12)  500 mcg PO DAILY [facility]


   Stop: 11/10/18 08:59


   Last Admin: 09/11/18 09:41 Dose:  Not Given


Docusate Sodium (Colace)  250 mg PO HS [facility]


   Stop: 11/10/18 20:59


   Last Admin: 09/11/18 20:42 Dose:  Not Given


Gabapentin (Neurontin)  300 mg PO TID [facility]


   Stop: 11/10/18 08:59


   Last Admin: 09/11/18 20:42 Dose:  Not Given


Potassium Chloride/Dextrose/Sod Cl (D5-0.45ns W/20 Meq Kcl)  1,000 mls @ 90 mls/

hr IV .Q11H7M [facility]


   Stop: 11/09/18 09:06


   Last Admin: 09/11/18 22:06 Dose:  90 mls/hr


Piperacillin Sod/Tazobactam (Sod 3.375 gm/ Sodium Chloride)  50 mls @ 100 mls/

hr IV Q8H [facility]


   Stop: 11/09/18 16:59


   Last Admin: 09/12/18 01:06 Dose:  Not Given


Potassium Chloride (Potassium Chloride)  20 meq in 100 mls @ 50 mls/hr IV Q2H 

[facility]


   Stop: 09/12/18 11:14


Lactobacillus Rhamnosus (Culturelle 15b)  1 each PO DAILY [facility]


   Stop: 11/10/18 08:59


   Last Admin: 09/11/18 09:41 Dose:  Not Given


Lorazepam (Ativan)  0.5 mg PO Q8HR [facility]; Protocol


   Stop: 11/10/18 04:59


Magnesium Hydroxide (Milk Of Magnesia)  30 ml PO Q48HR [facility]


   Stop: 11/09/18 21:59


   Last Admin: 09/11/18 01:28 Dose:  Not Given


Mineral Oil (Mineral Oil 30 Ml)  30 ml PO BID [facility]


   Stop: 09/16/18 16:59


   Last Admin: 09/11/18 16:37 Dose:  30 ml


Mirtazapine (Remeron)  15 mg PO HS [facility]; Protocol


   Stop: 11/10/18 20:59


Olanzapine (Zyprexa)  5 mg PO DAILY [facility]; Protocol


   Stop: 11/10/18 08:59


Olanzapine (Zyprexa)  10 mg PO HS [facility]; Protocol


   Stop: 11/10/18 20:59


Ondansetron HCl (Zofran Odt)  4 mg PO Q6HR PRN


   PRN Reason: Nausea / Vomiting


   Stop: 11/10/18 00:44


Oxybutynin Chloride (Ditropan)  5 mg PO HS [facility]


   Stop: 11/10/18 20:59


   Last Admin: 09/11/18 20:42 Dose:  Not Given


Pantoprazole Sodium (Protonix)  40 mg PO DAILY [facility]


   Stop: 11/10/18 08:59


   Last Admin: 09/11/18 09:42 Dose:  Not Given


Sodium Chloride (Nacl Tab)  1 gm PO BID [facility]


   Stop: 11/10/18 08:59


   Last Admin: 09/11/18 16:37 Dose:  Not Given


Tamsulosin HCl (Flomax)  0.4 mg PO DAILY [facility]


   Stop: 11/10/18 08:59


   Last Admin: 09/11/18 09:42 Dose:  Not Given











Assessment/Plan





- Assessment


Assessment: 





67 YO MALE WITH ABD DISTENSION


LIKELY FECAL IMPACTION





1.CONT LAXATIVES


2.PT REFUSING COLO


3.WILL NEED BARIUM ENEMA AS ALTERNATIVE

## 2018-09-12 NOTE — DIAGNOSTIC IMAGING REPORT
KUB abdominal film



HISTORY: Abdominal distention



The exam demonstrates a dilated stool-filled ascending colon.  Mildly

dilated air-filled descending and sigmoid colon also noted.  Overall

appearance may be associated with changes of a fecal impaction.  No free

and peritoneal air.



IMPRESSION:

1.  Stool-filled dilated large bowel.  Findings may be associated with a

fecal impaction.  Clinical correlation and follow-up recommended.

## 2018-09-13 RX ADMIN — Medication SCH EACH: at 09:43

## 2018-09-13 RX ADMIN — POTASSIUM CHLORIDE, DEXTROSE MONOHYDRATE AND SODIUM CHLORIDE SCH MLS/HR: 150; 5; 450 INJECTION, SOLUTION INTRAVENOUS at 15:26

## 2018-09-13 RX ADMIN — PANTOPRAZOLE SODIUM SCH MG: 40 TABLET, DELAYED RELEASE ORAL at 09:44

## 2018-09-13 NOTE — GI PROGRESS NOTE
Subjective





- Review of Systems


Subjective: 





NO EVENTS


DENIES ABD PAIN


HAD BM





Objective





- Results


Result Diagrams: 


 09/12/18 05:40





 09/12/18 05:40


Recent Labs: 


 Laboratory Last Values











WBC  8.5 Th/cmm (4.8-10.8)   09/12/18  05:40    


 


RBC  3.85 Mil/cmm (3.80-5.80)   09/12/18  05:40    


 


Hgb  12.2 gm/dL (12-16)   09/12/18  05:40    


 


Hct  36.1 % (41.0-60)  L  09/12/18  05:40    


 


MCV  93.7 fl (80-99)   09/12/18  05:40    


 


MCH  31.7 pg (27.0-31.0)  H  09/12/18  05:40    


 


MCHC Differential  33.8 pg (28.0-36.0)   09/12/18  05:40    


 


RDW  15.6 % (11.5-20.0)   09/12/18  05:40    


 


Plt Count  252 Th/cmm (150-400)   09/12/18  05:40    


 


MPV  8.1 fl  09/12/18  05:40    


 


Neutrophils %  73.9 % (40.0-80.0)   09/12/18  05:40    


 


Lymphocytes %  11.3 % (20.0-50.0)  L  09/12/18  05:40    


 


Monocytes %  13.9 % (2.0-10.0)  H  09/12/18  05:40    


 


Eosinophils %  0.7 % (0.0-5.0)   09/12/18  05:40    


 


Basophils %  0.2 % (0.0-2.0)   09/12/18  05:40    


 


Sodium  137 mEq/L (136-145)   09/12/18  05:40    


 


Potassium  3.0 mEq/L (3.5-5.1)  L  09/12/18  05:40    


 


Chloride  103 mEq/L ()   09/12/18  05:40    


 


Carbon Dioxide  26.4 mEq/L (21.0-31.0)   09/12/18  05:40    


 


Anion Gap  10.6  (7.0-16.0)   09/12/18  05:40    


 


BUN  21 mg/dL (7-25)   09/12/18  05:40    


 


Creatinine  0.7 mg/dL (0.7-1.3)   09/12/18  05:40    


 


Est GFR ( Amer)  > 60.0 ml/min (>90)   09/12/18  05:40    


 


Est GFR (Non-Af Amer)  > 60.0 ml/min  09/12/18  05:40    


 


BUN/Creatinine Ratio  30.0   09/12/18  05:40    


 


Glucose  81 mg/dL ()   09/12/18  05:40    


 


Whole Bld Lactic Acid  1.26 mmol/L (0.60-1.99)   09/10/18  05:10    


 


Calcium  8.4 mg/dL (8.6-10.3)  L  09/12/18  05:40    


 


Total Bilirubin  0.6 mg/dL (0.3-1.0)   09/10/18  05:10    


 


AST  12 U/L (13-39)  L  09/10/18  05:10    


 


ALT  15 U/L (7-52)   09/10/18  05:10    


 


Alkaline Phosphatase  98 U/L ()   09/10/18  05:10    


 


Ammonia  88 umol/L (16-53)  H  09/10/18  05:10    


 


Troponin I  < 0.01 ng/mL (0.01-0.05)  L  09/10/18  05:10    


 


Total Protein  6.5 gm/dL (6.0-8.3)   09/10/18  05:10    


 


Albumin  3.2 gm/dL (4.2-5.5)  L  09/10/18  05:10    


 


Globulin  3.3 gm/dL  09/10/18  05:10    


 


Albumin/Globulin Ratio  1.0  (1.0-1.8)   09/10/18  05:10    


 


Urine Source  CLEAN C   09/11/18  06:57    


 


Urine Color  YELLOW   09/11/18  06:57    


 


Urine Clarity  CLEAR  (CLEAR)   09/11/18  06:57    


 


Urine pH  6.0  (4.6 - 8.0)   09/11/18  06:57    


 


Ur Specific Gravity  1.025  (1.005-1.030)   09/11/18  06:57    


 


Urine Protein  TRACE mg/dL (NEGATIVE)   09/11/18  06:57    


 


Urine Glucose (UA)  NEGATIVE mg/dL (NEGATIVE)   09/11/18  06:57    


 


Urine Ketones  TRACE mg/dL (NEGATIVE)   09/11/18  06:57    


 


Urine Blood  NEGATIVE  (NEGATIVE)   09/11/18  06:57    


 


Urine Nitrate  NEGATIVE  (NEGATIVE)   09/11/18  06:57    


 


Urine Bilirubin  NEGATIVE  (NEGATIVE)   09/11/18  06:57    


 


Urine Urobilinogen  1.0 E.U./dL (0.2 - 1.0)   09/11/18  06:57    


 


Ur Leukocyte Esterase  NEGATIVE  (NEGATIVE)   09/11/18  06:57    


 


Urine RBC  0-2 /hpf (0-5)  H  09/11/18  06:57    


 


Urine WBC  0-2 /hpf (0-5)   09/11/18  06:57    


 


Ur Epithelial Cells  OCCASIONAL /lpf (FEW)   09/11/18  06:57    


 


Uric Acid Crystals  FEW /hpf (NONE SEEN)   09/11/18  06:57    


 


Urine Bacteria  NONE SEEN /hpf (NONE SEEN)   09/11/18  06:57    














- Physical Exam


Vitals and I&O: 


 Vital Signs











Temp  97.9 F   09/13/18 00:00


 


Pulse  85   09/13/18 07:24


 


Resp  18   09/13/18 07:24


 


BP  101/60   09/13/18 00:00


 


Pulse Ox  94   09/13/18 07:24








 Intake & Output











 09/12/18 09/13/18 09/13/18





 18:59 06:59 18:59


 


Intake Total 1200 850 964.5


 


Output Total  3 


 


Balance 1200 847 964.5


 


Weight (lbs)  58.967 kg 58.967 kg


 


Intake:   


 


  Intake, IV Amount 1200  844.5


 


    D5-0.45NS w/20 mEq KCL 1, 1000  844.5





    000 ml @ 90 mls/hr IV .   





    Q11H7M [facility] Rx#:[id number]   


 


    KCL 20mEq/100mL Premix 20 100  





    meq In 100 ml @ 50 mls/   





    hr IV Q2H [facility] Rx#:   





    [id number]   


 


    Piperacillin Sodium/ 100  





    Tazobact 3.375 gm In   





    Sodium Chloride 0.9% 50   





    ml @ 100 mls/hr IV Q8H   





    [facility] Rx#:[id number]   


 


  Oral  550 120


 


  Other  300 


 


Output:   


 


  Urine  3 


 


Other:   


 


  # Voids   2


 


  # Bowel Movements  1 2


 


  Stool Characteristics  Soft 





  Brown 


 


  Weight Source  Bedscale Bedscale











Active Medications: 


Current Medications





Amiodarone HCl (Cordarone)  200 mg PO DAILY [facility]


   Stop: 11/10/18 08:59


   Last Admin: 09/12/18 10:07 Dose:  Not Given


Cyanocobalamin (Vitamin B12)  500 mcg PO DAILY [facility]


   Stop: 11/10/18 08:59


   Last Admin: 09/12/18 10:08 Dose:  500 mcg


Docusate Sodium (Colace)  250 mg PO HS [facility]


   Stop: 11/10/18 20:59


   Last Admin: 09/12/18 22:20 Dose:  250 mg


Gabapentin (Neurontin)  300 mg PO TID [facility]


   Stop: 11/10/18 08:59


   Last Admin: 09/12/18 22:20 Dose:  300 mg


Potassium Chloride/Dextrose/Sod Cl (D5-0.45ns W/20 Meq Kcl)  1,000 mls @ 90 mls/

hr IV .Q11H7M [facility]


   Stop: 11/09/18 09:06


   Last Infusion: 09/13/18 07:49 Dose:  90 mls/hr


Piperacillin Sod/Tazobactam (Sod 3.375 gm/ Sodium Chloride)  50 mls @ 100 mls/

hr IV Q8H [facility]


   Stop: 11/09/18 16:59


   Last Admin: 09/13/18 01:27 Dose:  100 mls/hr


Lactobacillus Rhamnosus (Culturelle 15b)  1 each PO DAILY [facility]


   Stop: 11/10/18 08:59


   Last Admin: 09/12/18 09:00 Dose:  1 each


Lorazepam (Ativan)  0.5 mg PO Q8HR [facility]; Protocol


   Stop: 11/10/18 04:59


Magnesium Hydroxide (Milk Of Magnesia)  30 ml PO Q48HR [facility]


   Stop: 11/09/18 21:59


   Last Admin: 09/12/18 22:20 Dose:  30 ml


Mineral Oil (Mineral Oil 30 Ml)  30 ml PO BID [facility]


   Stop: 09/16/18 16:59


   Last Admin: 09/12/18 17:41 Dose:  30 ml


Mirtazapine (Remeron)  15 mg PO HS [facility]; Protocol


   Stop: 11/10/18 20:59


Olanzapine (Zyprexa)  5 mg PO DAILY [facility]; Protocol


   Stop: 11/10/18 08:59


Olanzapine (Zyprexa)  10 mg PO HS [facility]; Protocol


   Stop: 11/10/18 20:59


Ondansetron HCl (Zofran Odt)  4 mg PO Q6HR PRN


   PRN Reason: Nausea / Vomiting


   Stop: 11/10/18 00:44


Oxybutynin Chloride (Ditropan)  5 mg PO HS [facility]


   Stop: 11/10/18 20:59


   Last Admin: 09/12/18 22:21 Dose:  5 mg


Pantoprazole Sodium (Protonix)  40 mg PO DAILY [facility]


   Stop: 11/10/18 08:59


   Last Admin: 09/12/18 10:10 Dose:  40 mg


Sodium Chloride (Nacl Tab)  1 gm PO BID [facility]


   Stop: 11/10/18 08:59


   Last Admin: 09/12/18 17:41 Dose:  1 gm


Tamsulosin HCl (Flomax)  0.4 mg PO DAILY [facility]


   Stop: 11/10/18 08:59


   Last Admin: 09/12/18 10:08 Dose:  0.4 mg











Assessment/Plan





- Assessment


Assessment: 





69 YO MALE WITH ABD DISTENSION


LIKELY FECAL IMPACTION NOW IMPROVED 


HAVING BM





1.CONT LAXATIVES


2.PT REFUSING COLO


3.WILL NEED BARIUM ENEMA AS ALTERNATIVE

## 2018-09-13 NOTE — INTERNAL MEDICINE PROG NOTE
Internal Medicine Subjective





- Subjective


Service Date: 09/13/18


Patient seen and examined:: without staff


Patient is:: asleep, non-interactive, in bed


Per staff patient has:: no adverse event





Internal Medicine Objective





- Results


Result Diagrams: 


 09/12/18 05:40





 09/12/18 05:40


Recent Labs: 


 Laboratory Last Values











WBC  8.5 Th/cmm (4.8-10.8)   09/12/18  05:40    


 


RBC  3.85 Mil/cmm (3.80-5.80)   09/12/18  05:40    


 


Hgb  12.2 gm/dL (12-16)   09/12/18  05:40    


 


Hct  36.1 % (41.0-60)  L  09/12/18  05:40    


 


MCV  93.7 fl (80-99)   09/12/18  05:40    


 


MCH  31.7 pg (27.0-31.0)  H  09/12/18  05:40    


 


MCHC Differential  33.8 pg (28.0-36.0)   09/12/18  05:40    


 


RDW  15.6 % (11.5-20.0)   09/12/18  05:40    


 


Plt Count  252 Th/cmm (150-400)   09/12/18  05:40    


 


MPV  8.1 fl  09/12/18  05:40    


 


Neutrophils %  73.9 % (40.0-80.0)   09/12/18  05:40    


 


Lymphocytes %  11.3 % (20.0-50.0)  L  09/12/18  05:40    


 


Monocytes %  13.9 % (2.0-10.0)  H  09/12/18  05:40    


 


Eosinophils %  0.7 % (0.0-5.0)   09/12/18  05:40    


 


Basophils %  0.2 % (0.0-2.0)   09/12/18  05:40    


 


Sodium  137 mEq/L (136-145)   09/12/18  05:40    


 


Potassium  3.0 mEq/L (3.5-5.1)  L  09/12/18  05:40    


 


Chloride  103 mEq/L ()   09/12/18  05:40    


 


Carbon Dioxide  26.4 mEq/L (21.0-31.0)   09/12/18  05:40    


 


Anion Gap  10.6  (7.0-16.0)   09/12/18  05:40    


 


BUN  21 mg/dL (7-25)   09/12/18  05:40    


 


Creatinine  0.7 mg/dL (0.7-1.3)   09/12/18  05:40    


 


Est GFR ( Amer)  > 60.0 ml/min (>90)   09/12/18  05:40    


 


Est GFR (Non-Af Amer)  > 60.0 ml/min  09/12/18  05:40    


 


BUN/Creatinine Ratio  30.0   09/12/18  05:40    


 


Glucose  81 mg/dL ()   09/12/18  05:40    


 


Whole Bld Lactic Acid  1.26 mmol/L (0.60-1.99)   09/10/18  05:10    


 


Calcium  8.4 mg/dL (8.6-10.3)  L  09/12/18  05:40    


 


Total Bilirubin  0.6 mg/dL (0.3-1.0)   09/10/18  05:10    


 


AST  12 U/L (13-39)  L  09/10/18  05:10    


 


ALT  15 U/L (7-52)   09/10/18  05:10    


 


Alkaline Phosphatase  98 U/L ()   09/10/18  05:10    


 


Ammonia  88 umol/L (16-53)  H  09/10/18  05:10    


 


Troponin I  < 0.01 ng/mL (0.01-0.05)  L  09/10/18  05:10    


 


Total Protein  6.5 gm/dL (6.0-8.3)   09/10/18  05:10    


 


Albumin  3.2 gm/dL (4.2-5.5)  L  09/10/18  05:10    


 


Globulin  3.3 gm/dL  09/10/18  05:10    


 


Albumin/Globulin Ratio  1.0  (1.0-1.8)   09/10/18  05:10    


 


Urine Source  CLEAN C   09/11/18  06:57    


 


Urine Color  YELLOW   09/11/18  06:57    


 


Urine Clarity  CLEAR  (CLEAR)   09/11/18  06:57    


 


Urine pH  6.0  (4.6 - 8.0)   09/11/18  06:57    


 


Ur Specific Gravity  1.025  (1.005-1.030)   09/11/18  06:57    


 


Urine Protein  TRACE mg/dL (NEGATIVE)   09/11/18  06:57    


 


Urine Glucose (UA)  NEGATIVE mg/dL (NEGATIVE)   09/11/18  06:57    


 


Urine Ketones  TRACE mg/dL (NEGATIVE)   09/11/18  06:57    


 


Urine Blood  NEGATIVE  (NEGATIVE)   09/11/18  06:57    


 


Urine Nitrate  NEGATIVE  (NEGATIVE)   09/11/18  06:57    


 


Urine Bilirubin  NEGATIVE  (NEGATIVE)   09/11/18  06:57    


 


Urine Urobilinogen  1.0 E.U./dL (0.2 - 1.0)   09/11/18  06:57    


 


Ur Leukocyte Esterase  NEGATIVE  (NEGATIVE)   09/11/18  06:57    


 


Urine RBC  0-2 /hpf (0-5)  H  09/11/18  06:57    


 


Urine WBC  0-2 /hpf (0-5)   09/11/18  06:57    


 


Ur Epithelial Cells  OCCASIONAL /lpf (FEW)   09/11/18  06:57    


 


Uric Acid Crystals  FEW /hpf (NONE SEEN)   09/11/18  06:57    


 


Urine Bacteria  NONE SEEN /hpf (NONE SEEN)   09/11/18  06:57    














- Physical Exam


Vitals and I&O: 


 Vital Signs











Temp  97.6 F   09/13/18 15:34


 


Pulse  84   09/13/18 20:10


 


Resp  18   09/13/18 20:10


 


BP  198/67   09/13/18 15:34


 


Pulse Ox  92   09/13/18 20:10








 Intake & Output











 09/13/18 09/13/18 09/14/18





 06:59 18:59 06:59


 


Intake Total 900 1170.0 


 


Output Total 3  


 


Balance 897 1170.0 


 


Weight (lbs) 58.967 kg 58.876 kg 


 


Intake:   


 


  Intake, IV Amount 50 1050.0 


 


    D5-0.45NS w/20 mEq KCL 1,  1000.0 





    000 ml @ 90 mls/hr IV .   





    Q11H7M [facility] Rx#:[id number]   


 


    Piperacillin Sodium/ 50 50 





    Tazobact 3.375 gm In   





    Sodium Chloride 0.9% 50   





    ml @ 100 mls/hr IV Q8H   





    [facility] Rx#:[id number]   


 


  Oral 550 120 


 


  Other 300  


 


Output:   


 


  Urine 3  


 


Other:   


 


  # Voids  2 


 


  # Bowel Movements 1 2 


 


  Stool Characteristics Soft  





 Brown  


 


  Weight Source Bedscale Bedscale 











Active Medications: 


Current Medications





Amiodarone HCl (Cordarone)  200 mg PO DAILY [facility]


   Stop: 11/10/18 08:59


   Last Admin: 09/13/18 09:43 Dose:  200 mg


Cyanocobalamin (Vitamin B12)  500 mcg PO DAILY [facility]


   Stop: 11/10/18 08:59


   Last Admin: 09/13/18 09:43 Dose:  500 mcg


Docusate Sodium (Colace)  250 mg PO HS [facility]


   Stop: 11/10/18 20:59


   Last Admin: 09/13/18 21:38 Dose:  250 mg


Gabapentin (Neurontin)  300 mg PO TID [facility]


   Stop: 11/10/18 08:59


   Last Admin: 09/13/18 21:38 Dose:  300 mg


Potassium Chloride/Dextrose/Sod Cl (D5-0.45ns W/20 Meq Kcl)  1,000 mls @ 90 mls/

hr IV .Q11H7M [facility]


   Stop: 11/09/18 09:06


   Last Admin: 09/13/18 15:26 Dose:  90 mls/hr


Piperacillin Sod/Tazobactam (Sod 3.375 gm/ Sodium Chloride)  50 mls @ 100 mls/

hr IV Q8H [facility]


   Stop: 11/09/18 16:59


   Last Admin: 09/13/18 16:13 Dose:  100 mls/hr


Lactobacillus Rhamnosus (Culturelle 15b)  1 each PO DAILY [facility]


   Stop: 11/10/18 08:59


   Last Admin: 09/13/18 09:43 Dose:  1 each


Lorazepam (Ativan)  0.5 mg PO Q8HR [facility]; Protocol


   Stop: 11/10/18 04:59


Magnesium Hydroxide (Milk Of Magnesia)  30 ml PO Q48HR [facility]


   Stop: 11/09/18 21:59


   Last Admin: 09/12/18 22:20 Dose:  30 ml


Mineral Oil (Mineral Oil 30 Ml)  30 ml PO BID [facility]


   Stop: 09/16/18 16:59


   Last Admin: 09/13/18 16:12 Dose:  30 ml


Mirtazapine (Remeron)  15 mg PO HS [facility]; Protocol


   Stop: 11/10/18 20:59


Miscellaneous (Probiotic Screen)  1 ea MC PRN PRN


   PRN Reason: PROTOCOL


   Stop: 11/12/18 11:44


Olanzapine (Zyprexa)  5 mg PO DAILY [facility]; Protocol


   Stop: 11/10/18 08:59


Olanzapine (Zyprexa)  10 mg PO HS [facility]; Protocol


   Stop: 11/10/18 20:59


Ondansetron HCl (Zofran Odt)  4 mg PO Q6HR PRN


   PRN Reason: Nausea / Vomiting


   Stop: 11/10/18 00:44


Oxybutynin Chloride (Ditropan)  5 mg PO HS [facility]


   Stop: 11/10/18 20:59


   Last Admin: 09/13/18 21:38 Dose:  5 mg


Pantoprazole Sodium (Protonix)  40 mg PO DAILY [facility]


   Stop: 11/10/18 08:59


   Last Admin: 09/13/18 09:44 Dose:  40 mg


Sodium Chloride (Nacl Tab)  1 gm PO BID [facility]


   Stop: 11/10/18 08:59


   Last Admin: 09/13/18 16:12 Dose:  1 gm


Tamsulosin HCl (Flomax)  0.4 mg PO DAILY [facility]


   Stop: 11/10/18 08:59


   Last Admin: 09/13/18 09:44 Dose:  0.4 mg








General: weak, congested, demented


HEENT: NC/AT, PERRLA


Neck: Supple, No JVD, No thyromegaly, No LAD


Lungs: wheezing, ronchi


Cardiovascular: RRR, Normal S1


Abdomen: non-tender, distended


Extremities: clear


Neurological: no change





Internal Medicine Assmt/Plan





- Assessment


Assessment: 


Noncompliance: education provided.





ALOC: on and off; observe.





Ileus: continue laxatives.





Hypokalemia: supplement.





H/O A. Fib: rate controlled.





Hypotension: better.

## 2018-09-14 RX ADMIN — Medication SCH EACH: at 08:28

## 2018-09-14 RX ADMIN — MAGNESIUM HYDROXIDE SCH: 400 SUSPENSION ORAL at 21:57

## 2018-09-14 RX ADMIN — PANTOPRAZOLE SODIUM SCH MG: 40 TABLET, DELAYED RELEASE ORAL at 08:28

## 2018-09-14 RX ADMIN — POTASSIUM CHLORIDE, DEXTROSE MONOHYDRATE AND SODIUM CHLORIDE SCH MLS/HR: 150; 5; 450 INJECTION, SOLUTION INTRAVENOUS at 16:49

## 2018-09-14 RX ADMIN — POTASSIUM CHLORIDE, DEXTROSE MONOHYDRATE AND SODIUM CHLORIDE SCH MLS/HR: 150; 5; 450 INJECTION, SOLUTION INTRAVENOUS at 04:27

## 2018-09-14 NOTE — INTERNAL MEDICINE PROG NOTE
Internal Medicine Subjective





- Subjective


Service Date: 18


Patient seen and examined:: without staff


Patient is:: asleep, non-interactive, in bed


Per staff patient has:: no adverse event





Internal Medicine Objective





- Results


Result Diagrams: 


 18 05:40





 18 05:40


Recent Labs: 


 Laboratory Last Values











WBC  8.5 Th/cmm (4.8-10.8)   18  05:40    


 


RBC  3.85 Mil/cmm (3.80-5.80)   18  05:40    


 


Hgb  12.2 gm/dL (12-16)   18  05:40    


 


Hct  36.1 % (41.0-60)  L  18  05:40    


 


MCV  93.7 fl (80-99)   18  05:40    


 


MCH  31.7 pg (27.0-31.0)  H  18  05:40    


 


MCHC Differential  33.8 pg (28.0-36.0)   18  05:40    


 


RDW  15.6 % (11.5-20.0)   18  05:40    


 


Plt Count  252 Th/cmm (150-400)   18  05:40    


 


MPV  8.1 fl  18  05:40    


 


Neutrophils %  73.9 % (40.0-80.0)   18  05:40    


 


Lymphocytes %  11.3 % (20.0-50.0)  L  18  05:40    


 


Monocytes %  13.9 % (2.0-10.0)  H  18  05:40    


 


Eosinophils %  0.7 % (0.0-5.0)   18  05:40    


 


Basophils %  0.2 % (0.0-2.0)   18  05:40    


 


Sodium  137 mEq/L (136-145)   18  05:40    


 


Potassium  3.0 mEq/L (3.5-5.1)  L  18  05:40    


 


Chloride  103 mEq/L ()   18  05:40    


 


Carbon Dioxide  26.4 mEq/L (21.0-31.0)   18  05:40    


 


Anion Gap  10.6  (7.0-16.0)   18  05:40    


 


BUN  21 mg/dL (7-25)   18  05:40    


 


Creatinine  0.7 mg/dL (0.7-1.3)   18  05:40    


 


Est GFR ( Amer)  > 60.0 ml/min (>90)   18  05:40    


 


Est GFR (Non-Af Amer)  > 60.0 ml/min  18  05:40    


 


BUN/Creatinine Ratio  30.0   18  05:40    


 


Glucose  81 mg/dL ()   18  05:40    


 


Whole Bld Lactic Acid  1.26 mmol/L (0.60-1.99)   09/10/18  05:10    


 


Calcium  8.4 mg/dL (8.6-10.3)  L  18  05:40    


 


Total Bilirubin  0.6 mg/dL (0.3-1.0)   09/10/18  05:10    


 


AST  12 U/L (13-39)  L  09/10/18  05:10    


 


ALT  15 U/L (7-52)   09/10/18  05:10    


 


Alkaline Phosphatase  98 U/L ()   09/10/18  05:10    


 


Ammonia  88 umol/L (16-53)  H  09/10/18  05:10    


 


Troponin I  < 0.01 ng/mL (0.01-0.05)  L  09/10/18  05:10    


 


Total Protein  6.5 gm/dL (6.0-8.3)   09/10/18  05:10    


 


Albumin  3.2 gm/dL (4.2-5.5)  L  09/10/18  05:10    


 


Globulin  3.3 gm/dL  09/10/18  05:10    


 


Albumin/Globulin Ratio  1.0  (1.0-1.8)   09/10/18  05:10    


 


Urine Source  CLEAN C   18  06:57    


 


Urine Color  YELLOW   18  06:57    


 


Urine Clarity  CLEAR  (CLEAR)   18  06:57    


 


Urine pH  6.0  (4.6 - 8.0)   18  06:57    


 


Ur Specific Gravity  1.025  (1.005-1.030)   18  06:57    


 


Urine Protein  TRACE mg/dL (NEGATIVE)   18  06:57    


 


Urine Glucose (UA)  NEGATIVE mg/dL (NEGATIVE)   18  06:57    


 


Urine Ketones  TRACE mg/dL (NEGATIVE)   18  06:57    


 


Urine Blood  NEGATIVE  (NEGATIVE)   18  06:57    


 


Urine Nitrate  NEGATIVE  (NEGATIVE)   18  06:57    


 


Urine Bilirubin  NEGATIVE  (NEGATIVE)   18  06:57    


 


Urine Urobilinogen  1.0 E.U./dL (0.2 - 1.0)   18  06:57    


 


Ur Leukocyte Esterase  NEGATIVE  (NEGATIVE)   18  06:57    


 


Urine RBC  0-2 /hpf (0-5)  H  18  06:57    


 


Urine WBC  0-2 /hpf (0-5)   18  06:57    


 


Ur Epithelial Cells  OCCASIONAL /lpf (FEW)   18  06:57    


 


Uric Acid Crystals  FEW /hpf (NONE SEEN)   18  06:57    


 


Urine Bacteria  NONE SEEN /hpf (NONE SEEN)   18  06:57    














- Physical Exam


Vitals and I&O: 


 Vital Signs











Temp  98.4 F   18 15:49


 


Pulse  72   18 15:49


 


Resp  18   18 20:00


 


BP  110/72   18 15:49


 


Pulse Ox  94   18 15:49








 Intake & Output











 09/14/18 09/14/18 09/15/18





 06:59 18:59 06:59


 


Intake Total 1050 1550 


 


Output Total 450  


 


Balance 600 1550 


 


Weight (lbs) 58.513 kg 61.19 kg 


 


Intake:   


 


  Intake, IV Amount 1050 1050 


 


    D5-0.45NS w/20 mEq KCL 1, 1000 1000 





    000 ml @ 90 mls/hr IV .   





    Q11H7M [facility] Rx#:[id number]   


 


    Piperacillin Sodium/ 50 50 





    Tazobact 3.375 gm In   





    Sodium Chloride 0.9% 50   





    ml @ 100 mls/hr IV Q8H   





    [facility] Rx#:[id number]   


 


  Oral  500 


 


Output:   


 


  Urine 450  


 


Other:   


 


  # Voids  3 


 


  # Bowel Movements  1 


 


  Stool Characteristics  Soft Soft





  Brown Brown


 


  Weight Source Bedscale Bedscale 











Active Medications: 


Current Medications





Amiodarone HCl (Cordarone)  200 mg PO DAILY [facility]


   Stop: 11/10/18 08:59


   Last Admin: 18 08:27 Dose:  200 mg


Cyanocobalamin (Vitamin B12)  500 mcg PO DAILY [facility]


   Stop: 11/10/18 08:59


   Last Admin: 18 08:28 Dose:  500 mcg


Docusate Sodium (Colace)  250 mg PO HS [facility]


   Stop: 11/10/18 20:59


   Last Admin: 18 21:56 Dose:  Not Given


Gabapentin (Neurontin)  300 mg PO TID [facility]


   Stop: 11/10/18 08:59


   Last Admin: 18 21:56 Dose:  Not Given


Potassium Chloride/Dextrose/Sod Cl (D5-0.45ns W/20 Meq Kcl)  1,000 mls @ 90 mls/

hr IV .Q11H7M [facility]


   Stop: 18 09:06


   Last Admin: 18 16:49 Dose:  90 mls/hr


Piperacillin Sod/Tazobactam (Sod 3.375 gm/ Sodium Chloride)  50 mls @ 100 mls/

hr IV Q8H [facility]


   Stop: 18 16:59


   Last Admin: 18 16:50 Dose:  100 mls/hr


Lactobacillus Rhamnosus (Culturelle 15b)  1 each PO DAILY [facility]


   Stop: 11/10/18 08:59


   Last Admin: 18 08:28 Dose:  1 each


Lorazepam (Ativan)  0.5 mg PO Q8HR PRN; Protocol


   PRN Reason: Anxiety


   Stop: 18 04:59


Magnesium Hydroxide (Milk Of Magnesia)  30 ml PO Q48HR [facility]


   Stop: 18 21:59


   Last Admin: 18 21:57 Dose:  Not Given


Mineral Oil (Mineral Oil 30 Ml)  30 ml PO BID [facility]


   Stop: 18 16:59


   Last Admin: 18 16:53 Dose:  30 ml


Mirtazapine (Remeron)  15 mg PO HS [facility]; Protocol


   Stop: 18 20:59


   Last Admin: 18 21:56 Dose:  Not Given


Miscellaneous (Probiotic Screen)  1 ea MC PRN PRN


   PRN Reason: PROTOCOL


   Stop: 18 11:44


Olanzapine (Zyprexa)  5 mg PO DAILY [facility]; Protocol


   Stop: 18 08:59


   Last Admin: 18 08:28 Dose:  5 mg


Olanzapine (Zyprexa)  10 mg PO HS [facility]; Protocol


   Stop: 18 20:59


   Last Admin: 18 21:56 Dose:  Not Given


Ondansetron HCl (Zofran Odt)  4 mg PO Q6HR PRN


   PRN Reason: Nausea / Vomiting


   Stop: 11/10/18 00:44


   Last Admin: 18 01:21 Dose:  4 mg


Oxybutynin Chloride (Ditropan)  5 mg PO HS [facility]


   Stop: 11/10/18 20:59


   Last Admin: 18 21:56 Dose:  Not Given


Pantoprazole Sodium (Protonix)  40 mg PO DAILY [facility]


   Stop: 11/10/18 08:59


   Last Admin: 18 08:28 Dose:  40 mg


Sodium Chloride (Nacl Tab)  1 gm PO BID [facility]


   Stop: 11/10/18 08:59


   Last Admin: 18 16:52 Dose:  1 gm


Tamsulosin HCl (Flomax)  0.4 mg PO DAILY [facility]


   Stop: 11/10/18 08:59


   Last Admin: 18 08:27 Dose:  0.4 mg








General: weak, congested, demented


HEENT: NC/AT, PERRLA


Neck: Supple, No JVD, No thyromegaly, No LAD


Lungs: wheezing, ronchi


Cardiovascular: RRR, Normal S1


Abdomen: non-tender, distended


Extremities: clear


Neurological: no change





Internal Medicine Assmt/Plan





- Assessment


Assessment: 


ALOC: on and off; observe.





Noncompliance: education provided.





Ileus: continue laxatives.





Hypokalemia: supplement.





H/O A. Fib: rate controlled.





Hypotension: better.





Nutritional Asmnt/Malnutr-PDOC





- Dietary Evaluation


Malnutrition Findings (Please click <Entered> for more info): 








Nutritional Asmnt/Malnutrition                             Start:  18 17:

41


Text:                                                      Status: Complete    

  


Freq:                                                                          

  


Protocol:                                                                      

  


 Document     18 17:41  LCHENG  (Rec: 18 17:57  [email or web address]  [id number])


 Nutritional Asmnt/Malnutrition


     Patient General Information


      Nutritional Screening                      Moderate Risk


      Diagnosis                                  ileus


      Pertinent Medical Hx/Surgical Hx           COPD, PNA, CAD, s/p MI, afib,


                                                 CVA, BPH, sepsis, UTI,


                                                 psychosis bipolar


      Subjective Information                     Pt seen lying in bed, awake


                                                 and alert. Pt requested solid


                                                 food. Explained clear liquid


                                                 diet for pt. Pt was not


                                                 verbalized understanding. Per


                                                 MD note , pt has no abd


                                                 pain, had BM, refused golytely


      Current Diet Order/ Nutrition Support      clear liquid


      Pertinent Medications                      vit B12, colcae, culturelle,


                                                 mineral oil, remeron, protonix


                                                 , piperacillin, D5-0.45ns w/


                                                 20meq kcl, nacl tab


      Pertinent Labs                              K 3.0, Ca 8.4


     Nutritional Hx/Data


      Height                                     1.8 m


      Height (Calculated Centimeters)            180.3


      Current Weight (lbs)                       58.513 kg


      Weight (Calculated Kilograms)              58.5


      Weight (Calculated Grams)                  10836.4


      Ideal Body Weight                          172


      Body Mass Index (BMI)                      17.9


      Weight Status                              Underweight


     GI Symptoms


      GI Symptoms                                None


      Last BM                                    9/13 x2


      Difficult in:                              None


      Skin Integrity/Comment:                    intact


     Estimated Nutritional Goals


      BEE in Kcals:                              Using Current wt


      Calories/Kcals/Kg                          30-35


      Kcals Calculated                           6146-1025


      Protein:                                   Using Current wt


      Protein g/k.2


      Protein Calculated                         71


      Fluid: ml                                  1770-2065ml (1ml/kcal)


     Nutritional Problem


      1. Problem


       Problem                                   altered nutrition related labs


       Etiology                                  electrolytes imbalance


       Signs/Symptoms:                           K 2.9-3.0


     Intervention/Recommendation


      Comments                                   1. Advance diet as tolerated.


                                                 2. Monitor PO intake, wt, labs


                                                 and skin integrity


                                                 3. F/U as moderate risk in 3-5


                                                 days, -


     Expected Outcomes/Goals


      Expected Outcomes/Goals                    1. PO intake to meet at least


                                                 75% of nutritional needs.


                                                 2. Wt stability, skin to


                                                 remain intact, labs to


                                                 approach WNL.

## 2018-09-14 NOTE — GI PROGRESS NOTE
Subjective





- Review of Systems


Subjective: 





NO EVENTS


DENIES ABD PAIN


HAD BM


REFUSED GOLYTELY





Objective





- Results


Result Diagrams: 


 09/12/18 05:40





 09/12/18 05:40


Recent Labs: 


 Laboratory Last Values











WBC  8.5 Th/cmm (4.8-10.8)   09/12/18  05:40    


 


RBC  3.85 Mil/cmm (3.80-5.80)   09/12/18  05:40    


 


Hgb  12.2 gm/dL (12-16)   09/12/18  05:40    


 


Hct  36.1 % (41.0-60)  L  09/12/18  05:40    


 


MCV  93.7 fl (80-99)   09/12/18  05:40    


 


MCH  31.7 pg (27.0-31.0)  H  09/12/18  05:40    


 


MCHC Differential  33.8 pg (28.0-36.0)   09/12/18  05:40    


 


RDW  15.6 % (11.5-20.0)   09/12/18  05:40    


 


Plt Count  252 Th/cmm (150-400)   09/12/18  05:40    


 


MPV  8.1 fl  09/12/18  05:40    


 


Neutrophils %  73.9 % (40.0-80.0)   09/12/18  05:40    


 


Lymphocytes %  11.3 % (20.0-50.0)  L  09/12/18  05:40    


 


Monocytes %  13.9 % (2.0-10.0)  H  09/12/18  05:40    


 


Eosinophils %  0.7 % (0.0-5.0)   09/12/18  05:40    


 


Basophils %  0.2 % (0.0-2.0)   09/12/18  05:40    


 


Sodium  137 mEq/L (136-145)   09/12/18  05:40    


 


Potassium  3.0 mEq/L (3.5-5.1)  L  09/12/18  05:40    


 


Chloride  103 mEq/L ()   09/12/18  05:40    


 


Carbon Dioxide  26.4 mEq/L (21.0-31.0)   09/12/18  05:40    


 


Anion Gap  10.6  (7.0-16.0)   09/12/18  05:40    


 


BUN  21 mg/dL (7-25)   09/12/18  05:40    


 


Creatinine  0.7 mg/dL (0.7-1.3)   09/12/18  05:40    


 


Est GFR ( Amer)  > 60.0 ml/min (>90)   09/12/18  05:40    


 


Est GFR (Non-Af Amer)  > 60.0 ml/min  09/12/18  05:40    


 


BUN/Creatinine Ratio  30.0   09/12/18  05:40    


 


Glucose  81 mg/dL ()   09/12/18  05:40    


 


Whole Bld Lactic Acid  1.26 mmol/L (0.60-1.99)   09/10/18  05:10    


 


Calcium  8.4 mg/dL (8.6-10.3)  L  09/12/18  05:40    


 


Total Bilirubin  0.6 mg/dL (0.3-1.0)   09/10/18  05:10    


 


AST  12 U/L (13-39)  L  09/10/18  05:10    


 


ALT  15 U/L (7-52)   09/10/18  05:10    


 


Alkaline Phosphatase  98 U/L ()   09/10/18  05:10    


 


Ammonia  88 umol/L (16-53)  H  09/10/18  05:10    


 


Troponin I  < 0.01 ng/mL (0.01-0.05)  L  09/10/18  05:10    


 


Total Protein  6.5 gm/dL (6.0-8.3)   09/10/18  05:10    


 


Albumin  3.2 gm/dL (4.2-5.5)  L  09/10/18  05:10    


 


Globulin  3.3 gm/dL  09/10/18  05:10    


 


Albumin/Globulin Ratio  1.0  (1.0-1.8)   09/10/18  05:10    


 


Urine Source  CLEAN C   09/11/18  06:57    


 


Urine Color  YELLOW   09/11/18  06:57    


 


Urine Clarity  CLEAR  (CLEAR)   09/11/18  06:57    


 


Urine pH  6.0  (4.6 - 8.0)   09/11/18  06:57    


 


Ur Specific Gravity  1.025  (1.005-1.030)   09/11/18  06:57    


 


Urine Protein  TRACE mg/dL (NEGATIVE)   09/11/18  06:57    


 


Urine Glucose (UA)  NEGATIVE mg/dL (NEGATIVE)   09/11/18  06:57    


 


Urine Ketones  TRACE mg/dL (NEGATIVE)   09/11/18  06:57    


 


Urine Blood  NEGATIVE  (NEGATIVE)   09/11/18  06:57    


 


Urine Nitrate  NEGATIVE  (NEGATIVE)   09/11/18  06:57    


 


Urine Bilirubin  NEGATIVE  (NEGATIVE)   09/11/18  06:57    


 


Urine Urobilinogen  1.0 E.U./dL (0.2 - 1.0)   09/11/18  06:57    


 


Ur Leukocyte Esterase  NEGATIVE  (NEGATIVE)   09/11/18  06:57    


 


Urine RBC  0-2 /hpf (0-5)  H  09/11/18  06:57    


 


Urine WBC  0-2 /hpf (0-5)   09/11/18  06:57    


 


Ur Epithelial Cells  OCCASIONAL /lpf (FEW)   09/11/18  06:57    


 


Uric Acid Crystals  FEW /hpf (NONE SEEN)   09/11/18  06:57    


 


Urine Bacteria  NONE SEEN /hpf (NONE SEEN)   09/11/18  06:57    














- Physical Exam


Vitals and I&O: 


 Vital Signs











Temp  97.6 F   09/14/18 04:00


 


Pulse  73   09/14/18 08:27


 


Resp  18   09/14/18 07:18


 


BP  91/61   09/14/18 04:00


 


Pulse Ox  94   09/14/18 07:18








 Intake & Output











 09/13/18 09/14/18 09/14/18





 18:59 06:59 18:59


 


Intake Total 1220.0 1050 


 


Output Total  450 


 


Balance 1220.0 600 


 


Weight (lbs) 58.876 kg 58.513 kg 


 


Intake:   


 


  Intake, IV Amount 1100.0 1050 


 


    D5-0.45NS w/20 mEq KCL 1, 1000.0 1000 





    000 ml @ 90 mls/hr IV .   





    Q11H7M [facility] Rx#:[id number]   


 


    Piperacillin Sodium/ 100 50 





    Tazobact 3.375 gm In   





    Sodium Chloride 0.9% 50   





    ml @ 100 mls/hr IV Q8H   





    [facility] Rx#:[id number]   


 


  Oral 120  


 


Output:   


 


  Urine  450 


 


Other:   


 


  # Voids 2  


 


  # Bowel Movements 2  


 


  Weight Source Bedscale Bedscale 











Active Medications: 


Current Medications





Amiodarone HCl (Cordarone)  200 mg PO DAILY [facility]


   Stop: 11/10/18 08:59


   Last Admin: 09/14/18 08:27 Dose:  200 mg


Cyanocobalamin (Vitamin B12)  500 mcg PO DAILY [facility]


   Stop: 11/10/18 08:59


   Last Admin: 09/14/18 08:28 Dose:  500 mcg


Docusate Sodium (Colace)  250 mg PO HS [facility]


   Stop: 11/10/18 20:59


   Last Admin: 09/13/18 21:38 Dose:  250 mg


Gabapentin (Neurontin)  300 mg PO TID [facility]


   Stop: 11/10/18 08:59


   Last Admin: 09/14/18 08:27 Dose:  300 mg


Potassium Chloride/Dextrose/Sod Cl (D5-0.45ns W/20 Meq Kcl)  1,000 mls @ 90 mls/

hr IV .Q11H7M [facility]


   Stop: 11/09/18 09:06


   Last Admin: 09/14/18 04:27 Dose:  90 mls/hr


Piperacillin Sod/Tazobactam (Sod 3.375 gm/ Sodium Chloride)  50 mls @ 100 mls/

hr IV Q8H [facility]


   Stop: 11/09/18 16:59


   Last Admin: 09/14/18 08:28 Dose:  100 mls/hr


Lactobacillus Rhamnosus (Culturelle 15b)  1 each PO DAILY [facility]


   Stop: 11/10/18 08:59


   Last Admin: 09/14/18 08:28 Dose:  1 each


Lorazepam (Ativan)  0.5 mg PO Q8HR PRN; Protocol


   PRN Reason: Anxiety


   Stop: 11/13/18 04:59


Magnesium Hydroxide (Milk Of Magnesia)  30 ml PO Q48HR [facility]


   Stop: 11/09/18 21:59


   Last Admin: 09/12/18 22:20 Dose:  30 ml


Mineral Oil (Mineral Oil 30 Ml)  30 ml PO BID [facility]


   Stop: 09/16/18 16:59


   Last Admin: 09/14/18 08:28 Dose:  30 ml


Mirtazapine (Remeron)  15 mg PO HS [facility]; Protocol


   Stop: 11/13/18 20:59


Miscellaneous (Probiotic Screen)  1 ea MC PRN PRN


   PRN Reason: PROTOCOL


   Stop: 11/12/18 11:44


Olanzapine (Zyprexa)  5 mg PO DAILY [facility]; Protocol


   Stop: 11/13/18 08:59


   Last Admin: 09/14/18 08:28 Dose:  5 mg


Olanzapine (Zyprexa)  10 mg PO HS [facility]; Protocol


   Stop: 11/13/18 20:59


Ondansetron HCl (Zofran Odt)  4 mg PO Q6HR PRN


   PRN Reason: Nausea / Vomiting


   Stop: 11/10/18 00:44


   Last Admin: 09/14/18 01:21 Dose:  4 mg


Oxybutynin Chloride (Ditropan)  5 mg PO HS [facility]


   Stop: 11/10/18 20:59


   Last Admin: 09/13/18 21:38 Dose:  5 mg


Pantoprazole Sodium (Protonix)  40 mg PO DAILY [facility]


   Stop: 11/10/18 08:59


   Last Admin: 09/14/18 08:28 Dose:  40 mg


Sodium Chloride (Nacl Tab)  1 gm PO BID [facility]


   Stop: 11/10/18 08:59


   Last Admin: 09/14/18 08:28 Dose:  1 gm


Tamsulosin HCl (Flomax)  0.4 mg PO DAILY [facility]


   Stop: 11/10/18 08:59


   Last Admin: 09/14/18 08:27 Dose:  0.4 mg











Assessment/Plan





- Assessment


Assessment: 





67 YO MALE WITH ABD DISTENSION


LIKELY FECAL IMPACTION NOW IMPROVED 


HAVING BM





1.CONT LAXATIVES


2.PT REFUSING COLO


3.WILL NEED BARIUM ENEMA WHEN CLEANED OUT AS AN ALTERNATIVE

## 2018-09-15 LAB
ALBUMIN SERPL-MCNC: 2.5 GM/DL (ref 4.2–5.5)
ALBUMIN/GLOB SERPL: 0.8 {RATIO} (ref 1–1.8)
ALP SERPL-CCNC: 67 U/L (ref 34–104)
ALT SERPL-CCNC: 10 U/L (ref 7–52)
ANION GAP SERPL CALC-SCNC: 7.7 MMOL/L (ref 7–16)
AST SERPL-CCNC: 10 U/L (ref 13–39)
BASOPHILS # BLD AUTO: 0 TH/CUMM (ref 0–0.2)
BILIRUB SERPL-MCNC: 0.4 MG/DL (ref 0.3–1)
BUN SERPL-MCNC: 7 MG/DL (ref 7–25)
CALCIUM SERPL-MCNC: 8.1 MG/DL (ref 8.6–10.3)
CHLORIDE SERPL-SCNC: 104 MEQ/L (ref 98–107)
CO2 SERPL-SCNC: 23.9 MEQ/L (ref 21–31)
CREAT SERPL-MCNC: 0.6 MG/DL (ref 0.7–1.3)
EOSINOPHIL # BLD AUTO: 0.1 TH/CMM (ref 0.1–0.4)
ERYTHROCYTE [DISTWIDTH] IN BLOOD BY AUTOMATED COUNT: 14.6 % (ref 11.5–20)
GLOBULIN SER-MCNC: 3 GM/DL
GLUCOSE SERPL-MCNC: 116 MG/DL (ref 70–105)
HCT VFR BLD CALC: 35.2 % (ref 41–60)
HGB BLD-MCNC: 11.7 GM/DL (ref 12–16)
LYMPHOCYTE AB SER FC-ACNC: 1.9 TH/CMM (ref 1.5–3)
LYMPHOCYTES # BLD MANUAL: 20 % (ref 20–50)
MCH RBC QN AUTO: 31 PG (ref 27–31)
MCHC RBC AUTO-ENTMCNC: 33.2 PG (ref 28–36)
MCV RBC AUTO: 93.5 FL (ref 80–99)
MONOCYTES # BLD AUTO: 1.6 TH/CMM (ref 0.3–1)
MONOCYTES # BLD MANUAL: 20 % (ref 2–10)
NEUTROPHILS # BLD: 4.6 TH/CMM (ref 1.8–8)
NEUTROPHILS NFR BLD AUTO: 57 % (ref 40–80)
NEUTS BAND NFR BLD: 3 % (ref 0–10)
PLATELET # BLD EST: ADEQUATE 10*3/UL
PLATELET # BLD: 205 TH/CMM (ref 150–400)
PMV BLD AUTO: 8.1 FL
POTASSIUM SERPL-SCNC: 3.6 MEQ/L (ref 3.5–5.1)
RBC # BLD AUTO: 3.77 MIL/CMM (ref 3.8–5.8)
SODIUM SERPL-SCNC: 132 MEQ/L (ref 136–145)
WBC # BLD AUTO: 8.2 TH/CMM (ref 4.8–10.8)

## 2018-09-15 RX ADMIN — POTASSIUM CHLORIDE, DEXTROSE MONOHYDRATE AND SODIUM CHLORIDE SCH MLS/HR: 150; 5; 450 INJECTION, SOLUTION INTRAVENOUS at 06:06

## 2018-09-15 RX ADMIN — Medication SCH EACH: at 09:35

## 2018-09-15 RX ADMIN — PANTOPRAZOLE SODIUM SCH MG: 40 TABLET, DELAYED RELEASE ORAL at 09:35

## 2018-09-15 NOTE — INTERNAL MEDICINE PROG NOTE
Internal Medicine Subjective





- Subjective


Service Date: 09/15/18


Patient seen and examined:: without staff


Patient is:: asleep, non-interactive, in bed


Per staff patient has:: no adverse event





Internal Medicine Objective





- Results


Result Diagrams: 


 09/15/18 06:43





 09/15/18 06:43


Recent Labs: 


 Laboratory Last Values











WBC  8.2 Th/cmm (4.8-10.8)   09/15/18  06:43    


 


RBC  3.77 Mil/cmm (3.80-5.80)  L  09/15/18  06:43    


 


Hgb  11.7 gm/dL (12-16)  L  09/15/18  06:43    


 


Hct  35.2 % (41.0-60)  L  09/15/18  06:43    


 


MCV  93.5 fl (80-99)   09/15/18  06:43    


 


MCH  31.0 pg (27.0-31.0)   09/15/18  06:43    


 


MCHC Differential  33.2 pg (28.0-36.0)   09/15/18  06:43    


 


RDW  14.6 % (11.5-20.0)   09/15/18  06:43    


 


Plt Count  205 Th/cmm (150-400)   09/15/18  06:43    


 


MPV  8.1 fl  09/15/18  06:43    


 


Add Manual Diff  YES   09/15/18  06:43    


 


Neutrophils %  73.9 % (40.0-80.0)   18  05:40    


 


Band Neutrophils %  3 % (0-10)   09/15/18  06:43    


 


Lymphocytes %  11.3 % (20.0-50.0)  L  18  05:40    


 


Monocytes %  13.9 % (2.0-10.0)  H  18  05:40    


 


Eosinophils %  0.7 % (0.0-5.0)   18  05:40    


 


Basophils %  0.2 % (0.0-2.0)   18  05:40    


 


Neutrophils (Manual)  57 % (40-80)   09/15/18  06:43    


 


Lymphocytes  20 % (20-50)   09/15/18  06:43    


 


Monocytes  20 % (2-10)  H  09/15/18  06:43    


 


Platelet Estimate  ADEQUATE  (NORMAL)   09/15/18  06:43    


 


Sodium  132 mEq/L (136-145)  L  09/15/18  06:43    


 


Potassium  3.6 mEq/L (3.5-5.1)   09/15/18  06:43    


 


Chloride  104 mEq/L ()   09/15/18  06:43    


 


Carbon Dioxide  23.9 mEq/L (21.0-31.0)   09/15/18  06:43    


 


Anion Gap  7.7  (7.0-16.0)   09/15/18  06:43    


 


BUN  7 mg/dL (7-25)   09/15/18  06:43    


 


Creatinine  0.6 mg/dL (0.7-1.3)  L  09/15/18  06:43    


 


Est GFR ( Amer)  > 60.0 ml/min (>90)   09/15/18  06:43    


 


Est GFR (Non-Af Amer)  > 60.0 ml/min  09/15/18  06:43    


 


BUN/Creatinine Ratio  11.7   09/15/18  06:43    


 


Glucose  116 mg/dL ()  H  09/15/18  06:43    


 


Whole Bld Lactic Acid  1.26 mmol/L (0.60-1.99)   09/10/18  05:10    


 


Calcium  8.1 mg/dL (8.6-10.3)  L  09/15/18  06:43    


 


Total Bilirubin  0.4 mg/dL (0.3-1.0)   09/15/18  06:43    


 


AST  10 U/L (13-39)  L  09/15/18  06:43    


 


ALT  10 U/L (7-52)   09/15/18  06:43    


 


Alkaline Phosphatase  67 U/L ()   09/15/18  06:43    


 


Ammonia  88 umol/L (16-53)  H  09/10/18  05:10    


 


Troponin I  < 0.01 ng/mL (0.01-0.05)  L  09/10/18  05:10    


 


Total Protein  5.5 gm/dL (6.0-8.3)  L  09/15/18  06:43    


 


Albumin  2.5 gm/dL (4.2-5.5)  L  09/15/18  06:43    


 


Globulin  3.0 gm/dL  09/15/18  06:43    


 


Albumin/Globulin Ratio  0.8  (1.0-1.8)  L  09/15/18  06:43    


 


Urine Source  CLEAN C   18  06:57    


 


Urine Color  YELLOW   18  06:57    


 


Urine Clarity  CLEAR  (CLEAR)   18  06:57    


 


Urine pH  6.0  (4.6 - 8.0)   18  06:57    


 


Ur Specific Gravity  1.025  (1.005-1.030)   18  06:57    


 


Urine Protein  TRACE mg/dL (NEGATIVE)   18  06:57    


 


Urine Glucose (UA)  NEGATIVE mg/dL (NEGATIVE)   18  06:57    


 


Urine Ketones  TRACE mg/dL (NEGATIVE)   18  06:57    


 


Urine Blood  NEGATIVE  (NEGATIVE)   18  06:57    


 


Urine Nitrate  NEGATIVE  (NEGATIVE)   18  06:57    


 


Urine Bilirubin  NEGATIVE  (NEGATIVE)   18  06:57    


 


Urine Urobilinogen  1.0 E.U./dL (0.2 - 1.0)   18  06:57    


 


Ur Leukocyte Esterase  NEGATIVE  (NEGATIVE)   18  06:57    


 


Urine RBC  0-2 /hpf (0-5)  H  18  06:57    


 


Urine WBC  0-2 /hpf (0-5)   18  06:57    


 


Ur Epithelial Cells  OCCASIONAL /lpf (FEW)   18  06:57    


 


Uric Acid Crystals  FEW /hpf (NONE SEEN)   18  06:57    


 


Urine Bacteria  NONE SEEN /hpf (NONE SEEN)   18  06:57    














- Physical Exam


Vitals and I&O: 


 Vital Signs











Temp  99.2 F   09/15/18 16:00


 


Pulse  85   09/15/18 16:00


 


Resp  18   09/15/18 16:00


 


BP  96/66   09/15/18 16:00


 


Pulse Ox  94   09/15/18 16:00








 Intake & Output











 09/15/18 09/15/18 09/16/18





 06:59 18:59 06:59


 


Intake Total 1050 1700 


 


Balance 1050 1700 


 


Weight (lbs) 61.235 kg 61.745 kg 


 


Intake:   


 


  Intake, IV Amount 1050 100 


 


    D5-0.45NS w/20 mEq KCL 1, 1000  





    000 ml @ 90 mls/hr IV .   





    Q11H7M [facility] Rx#:[id number]   


 


    Piperacillin Sodium/ 50 100 





    Tazobact 3.375 gm In   





    Sodium Chloride 0.9% 50   





    ml @ 100 mls/hr IV Q8H   





    [facility] Rx#:[id number]   


 


  Oral  1600 


 


Other:   


 


  # Voids  3 


 


  # Bowel Movements  1 


 


  Stool Characteristics Soft Soft 





 Brown Brown 


 


  Weight Source Bedscale Bedscale 











Active Medications: 


Current Medications





Amiodarone HCl (Cordarone)  200 mg PO DAILY [facility]


   Stop: 11/10/18 08:59


   Last Admin: 09/15/18 10:10 Dose:  200 mg


Cyanocobalamin (Vitamin B12)  500 mcg PO DAILY [facility]


   Stop: 11/10/18 08:59


   Last Admin: 09/15/18 10:10 Dose:  500 mcg


Docusate Sodium (Colace)  250 mg PO HS [facility]


   Stop: 11/10/18 20:59


   Last Admin: 09/15/18 20:53 Dose:  250 mg


Gabapentin (Neurontin)  300 mg PO TID [facility]


   Stop: 11/10/18 08:59


   Last Admin: 09/15/18 20:53 Dose:  300 mg


Potassium Chloride/Dextrose/Sod Cl (D5-0.45ns W/20 Meq Kcl)  1,000 mls @ 90 mls/

hr IV .Q11H7M [facility]


   Stop: 18 09:06


   Last Admin: 09/15/18 06:06 Dose:  90 mls/hr


Piperacillin Sod/Tazobactam (Sod 3.375 gm/ Sodium Chloride)  50 mls @ 100 mls/

hr IV Q8H [facility]


   Stop: 18 16:59


   Last Infusion: 09/15/18 17:10 Dose:  Infused


Lactobacillus Rhamnosus (Culturelle 15b)  1 each PO DAILY [facility]


   Stop: 11/10/18 08:59


   Last Admin: 09/15/18 09:35 Dose:  1 each


Lorazepam (Ativan)  0.5 mg PO Q8HR PRN; Protocol


   PRN Reason: Anxiety


   Stop: 18 04:59


   Last Admin: 09/15/18 09:30 Dose:  0.5 mg


Magnesium Hydroxide (Milk Of Magnesia)  30 ml PO Q48HR [facility]


   Stop: 18 21:59


   Last Admin: 18 21:57 Dose:  Not Given


Mineral Oil (Mineral Oil 30 Ml)  30 ml PO BID [facility]


   Stop: 18 16:59


   Last Admin: 09/15/18 16:37 Dose:  30 ml


Mirtazapine (Remeron)  15 mg PO HS [facility]; Protocol


   Stop: 18 20:59


   Last Admin: 09/15/18 20:54 Dose:  15 mg


Miscellaneous (Probiotic Screen)  1 ea MC PRN PRN


   PRN Reason: PROTOCOL


   Stop: 18 11:44


Olanzapine (Zyprexa)  5 mg PO DAILY [facility]; Protocol


   Stop: 18 08:59


   Last Admin: 09/15/18 10:09 Dose:  5 mg


Olanzapine (Zyprexa)  10 mg PO HS [facility]; Protocol


   Stop: 18 20:59


   Last Admin: 09/15/18 20:54 Dose:  10 mg


Ondansetron HCl (Zofran Odt)  4 mg PO Q6HR PRN


   PRN Reason: Nausea / Vomiting


   Stop: 11/10/18 00:44


   Last Admin: 18 01:21 Dose:  4 mg


Oxybutynin Chloride (Ditropan)  5 mg PO HS [facility]


   Stop: 11/10/18 20:59


   Last Admin: 09/15/18 20:54 Dose:  5 mg


Pantoprazole Sodium (Protonix)  40 mg PO DAILY [facility]


   Stop: 11/10/18 08:59


   Last Admin: 09/15/18 09:35 Dose:  40 mg


Sodium Chloride (Nacl Tab)  1 gm PO BID [facility]


   Stop: 11/10/18 08:59


   Last Admin: 09/15/18 16:37 Dose:  1 gm


Tamsulosin HCl (Flomax)  0.4 mg PO DAILY [facility]


   Stop: 11/10/18 08:59


   Last Admin: 09/15/18 10:09 Dose:  0.4 mg








General: weak, congested, demented


HEENT: NC/AT, PERRLA


Neck: Supple, No JVD, No thyromegaly, No LAD


Lungs: wheezing, ronchi


Cardiovascular: RRR, Normal S1


Abdomen: non-tender, distended


Extremities: clear


Neurological: no change





Internal Medicine Assmt/Plan





- Assessment


Assessment: 


Noncompliance: education provided.





ALOC: on and off; observe.





Ileus: continue laxatives.





Hypokalemia: supplement.





H/O A. Fib: rate controlled.





Hypotension: better.





Nutritional Asmnt/Malnutr-PDOC





- Dietary Evaluation


Malnutrition Findings (Please click <Entered> for more info): 








Nutritional Asmnt/Malnutrition                             Start:  18 17:

41


Text:                                                      Status: Complete    

  


Freq:                                                                          

  


Protocol:                                                                      

  


 Document     18 17:41  AMY  (Rec: 18 17:57  AMY  [id number])


 Nutritional Asmnt/Malnutrition


     Patient General Information


      Nutritional Screening                      Moderate Risk


      Diagnosis                                  ileus


      Pertinent Medical Hx/Surgical Hx           COPD, PNA, CAD, s/p MI, afib,


                                                 CVA, BPH, sepsis, UTI,


                                                 psychosis bipolar


      Subjective Information                     Pt seen lying in bed, awake


                                                 and alert. Pt requested solid


                                                 food. Explained clear liquid


                                                 diet for pt. Pt was not


                                                 verbalized understanding. Per


                                                 MD note , pt has no abd


                                                 pain, had BM, refused golytely


      Current Diet Order/ Nutrition Support      clear liquid


      Pertinent Medications                      vit B12, colcae, culturelle,


                                                 mineral oil, remeron, protonix


                                                 , piperacillin, D5-0.45ns w/


                                                 20meq kcl, nacl tab


      Pertinent Labs                              K 3.0, Ca 8.4


     Nutritional Hx/Data


      Height                                     1.8 m


      Height (Calculated Centimeters)            180.3


      Current Weight (lbs)                       58.513 kg


      Weight (Calculated Kilograms)              58.5


      Weight (Calculated Grams)                  12099.4


      Ideal Body Weight                          172


      Body Mass Index (BMI)                      17.9


      Weight Status                              Underweight


     GI Symptoms


      GI Symptoms                                None


      Last BM                                    9/13 x2


      Difficult in:                              None


      Skin Integrity/Comment:                    intact


     Estimated Nutritional Goals


      BEE in Kcals:                              Using Current wt


      Calories/Kcals/Kg                          30-35


      Kcals Calculated                           2195-1960


      Protein:                                   Using Current wt


      Protein g/k.2


      Protein Calculated                         71


      Fluid: ml                                  1770-2065ml (1ml/kcal)


     Nutritional Problem


      1. Problem


       Problem                                   altered nutrition related labs


       Etiology                                  electrolytes imbalance


       Signs/Symptoms:                           K 2.9-3.0


     Intervention/Recommendation


      Comments                                   1. Advance diet as tolerated.


                                                 2. Monitor PO intake, wt, labs


                                                 and skin integrity


                                                 3. F/U as moderate risk in 3-5


                                                 days, -


     Expected Outcomes/Goals


      Expected Outcomes/Goals                    1. PO intake to meet at least


                                                 75% of nutritional needs.


                                                 2. Wt stability, skin to


                                                 remain intact, labs to


                                                 approach WNL.

## 2018-09-16 RX ADMIN — POTASSIUM CHLORIDE, DEXTROSE MONOHYDRATE AND SODIUM CHLORIDE SCH MLS/HR: 150; 5; 450 INJECTION, SOLUTION INTRAVENOUS at 18:33

## 2018-09-16 RX ADMIN — PANTOPRAZOLE SODIUM SCH MG: 40 TABLET, DELAYED RELEASE ORAL at 09:11

## 2018-09-16 RX ADMIN — LACTULOSE SCH GM: 20 SOLUTION ORAL at 18:29

## 2018-09-16 RX ADMIN — Medication SCH EACH: at 09:10

## 2018-09-16 NOTE — INTERNAL MEDICINE PROG NOTE
Internal Medicine Subjective





- Subjective


Service Date: 18


Patient seen and examined:: without staff


Patient is:: asleep, non-interactive, in bed


Per staff patient has:: no adverse event





Internal Medicine Objective





- Results


Result Diagrams: 


 09/15/18 06:43





 09/15/18 06:43


Recent Labs: 


 Laboratory Last Values











WBC  8.2 Th/cmm (4.8-10.8)   09/15/18  06:43    


 


RBC  3.77 Mil/cmm (3.80-5.80)  L  09/15/18  06:43    


 


Hgb  11.7 gm/dL (12-16)  L  09/15/18  06:43    


 


Hct  35.2 % (41.0-60)  L  09/15/18  06:43    


 


MCV  93.5 fl (80-99)   09/15/18  06:43    


 


MCH  31.0 pg (27.0-31.0)   09/15/18  06:43    


 


MCHC Differential  33.2 pg (28.0-36.0)   09/15/18  06:43    


 


RDW  14.6 % (11.5-20.0)   09/15/18  06:43    


 


Plt Count  205 Th/cmm (150-400)   09/15/18  06:43    


 


MPV  8.1 fl  09/15/18  06:43    


 


Add Manual Diff  YES   09/15/18  06:43    


 


Neutrophils %  73.9 % (40.0-80.0)   18  05:40    


 


Band Neutrophils %  3 % (0-10)   09/15/18  06:43    


 


Lymphocytes %  11.3 % (20.0-50.0)  L  18  05:40    


 


Monocytes %  13.9 % (2.0-10.0)  H  18  05:40    


 


Eosinophils %  0.7 % (0.0-5.0)   18  05:40    


 


Basophils %  0.2 % (0.0-2.0)   18  05:40    


 


Neutrophils (Manual)  57 % (40-80)   09/15/18  06:43    


 


Lymphocytes  20 % (20-50)   09/15/18  06:43    


 


Monocytes  20 % (2-10)  H  09/15/18  06:43    


 


Platelet Estimate  ADEQUATE  (NORMAL)   09/15/18  06:43    


 


Sodium  132 mEq/L (136-145)  L  09/15/18  06:43    


 


Potassium  3.6 mEq/L (3.5-5.1)   09/15/18  06:43    


 


Chloride  104 mEq/L ()   09/15/18  06:43    


 


Carbon Dioxide  23.9 mEq/L (21.0-31.0)   09/15/18  06:43    


 


Anion Gap  7.7  (7.0-16.0)   09/15/18  06:43    


 


BUN  7 mg/dL (7-25)   09/15/18  06:43    


 


Creatinine  0.6 mg/dL (0.7-1.3)  L  09/15/18  06:43    


 


Est GFR ( Amer)  > 60.0 ml/min (>90)   09/15/18  06:43    


 


Est GFR (Non-Af Amer)  > 60.0 ml/min  09/15/18  06:43    


 


BUN/Creatinine Ratio  11.7   09/15/18  06:43    


 


Glucose  116 mg/dL ()  H  09/15/18  06:43    


 


Whole Bld Lactic Acid  1.26 mmol/L (0.60-1.99)   09/10/18  05:10    


 


Calcium  8.1 mg/dL (8.6-10.3)  L  09/15/18  06:43    


 


Total Bilirubin  0.4 mg/dL (0.3-1.0)   09/15/18  06:43    


 


AST  10 U/L (13-39)  L  09/15/18  06:43    


 


ALT  10 U/L (7-52)   09/15/18  06:43    


 


Alkaline Phosphatase  67 U/L ()   09/15/18  06:43    


 


Ammonia  88 umol/L (16-53)  H  09/10/18  05:10    


 


Troponin I  < 0.01 ng/mL (0.01-0.05)  L  09/10/18  05:10    


 


Total Protein  5.5 gm/dL (6.0-8.3)  L  09/15/18  06:43    


 


Albumin  2.5 gm/dL (4.2-5.5)  L  09/15/18  06:43    


 


Globulin  3.0 gm/dL  09/15/18  06:43    


 


Albumin/Globulin Ratio  0.8  (1.0-1.8)  L  09/15/18  06:43    


 


Urine Source  CLEAN C   18  06:57    


 


Urine Color  YELLOW   18  06:57    


 


Urine Clarity  CLEAR  (CLEAR)   18  06:57    


 


Urine pH  6.0  (4.6 - 8.0)   18  06:57    


 


Ur Specific Gravity  1.025  (1.005-1.030)   18  06:57    


 


Urine Protein  TRACE mg/dL (NEGATIVE)   18  06:57    


 


Urine Glucose (UA)  NEGATIVE mg/dL (NEGATIVE)   18  06:57    


 


Urine Ketones  TRACE mg/dL (NEGATIVE)   18  06:57    


 


Urine Blood  NEGATIVE  (NEGATIVE)   18  06:57    


 


Urine Nitrate  NEGATIVE  (NEGATIVE)   18  06:57    


 


Urine Bilirubin  NEGATIVE  (NEGATIVE)   18  06:57    


 


Urine Urobilinogen  1.0 E.U./dL (0.2 - 1.0)   18  06:57    


 


Ur Leukocyte Esterase  NEGATIVE  (NEGATIVE)   18  06:57    


 


Urine RBC  0-2 /hpf (0-5)  H  18  06:57    


 


Urine WBC  0-2 /hpf (0-5)   18  06:57    


 


Ur Epithelial Cells  OCCASIONAL /lpf (FEW)   18  06:57    


 


Uric Acid Crystals  FEW /hpf (NONE SEEN)   18  06:57    


 


Urine Bacteria  NONE SEEN /hpf (NONE SEEN)   18  06:57    














- Physical Exam


Vitals and I&O: 


 Vital Signs











Temp  98.5 F   18 20:00


 


Pulse  87   18 20:00


 


Resp  19   18 20:00


 


BP  98/56   18 20:00


 


Pulse Ox  97   18 20:00








 Intake & Output











 18





 06:59 18:59 06:59


 


Intake Total 250 50 50


 


Balance 250 50 50


 


Weight (lbs) 59.534 kg 59.421 kg 


 


Intake:   


 


  Intake, IV Amount 50 50 50


 


    Piperacillin Sodium/ 50 50 50





    Tazobact 3.375 gm In   





    Sodium Chloride 0.9% 50   





    ml @ 100 mls/hr IV Q8H   





    [facility] Rx#:[id number]   


 


  Oral 200  


 


Other:   


 


  # Voids 2  


 


  # Bowel Movements 0  


 


  Stool Characteristics Soft Liquid 





 Brown  


 


  Weight Source Bedscale Bedscale 











Active Medications: 


Current Medications





Amiodarone HCl (Cordarone)  200 mg PO DAILY [facility]


   Stop: 11/10/18 08:59


   Last Admin: 18 09:12 Dose:  200 mg


Bisacodyl (Dulcolax 5 Mg Ec Tab)  20 mg PO BID [facility]


   Stop: 11/15/18 16:59


   Last Admin: 18 18:29 Dose:  20 mg


Cyanocobalamin (Vitamin B12)  500 mcg PO DAILY [facility]


   Stop: 11/10/18 08:59


   Last Admin: 18 09:12 Dose:  500 mcg


Docusate Sodium (Colace)  250 mg PO HS [facility]


   Stop: 11/10/18 20:59


   Last Admin: 18 21:29 Dose:  250 mg


Gabapentin (Neurontin)  300 mg PO TID [facility]


   Stop: 11/10/18 08:59


   Last Admin: 18 21:29 Dose:  300 mg


Potassium Chloride/Dextrose/Sod Cl (D5-0.45ns W/20 Meq Kcl)  1,000 mls @ 90 mls/

hr IV .Q11H7M [facility]


   Stop: 18 09:06


   Last Admin: 18 18:33 Dose:  90 mls/hr


Piperacillin Sod/Tazobactam (Sod 3.375 gm/ Sodium Chloride)  50 mls @ 100 mls/

hr IV Q8H [facility]


   Stop: 18 16:59


   Last Infusion: 18 19:02 Dose:  Infused


Lactobacillus Rhamnosus (Culturelle 15b)  1 each PO DAILY [facility]


   Stop: 11/10/18 08:59


   Last Admin: 18 09:10 Dose:  1 each


Lactulose (Cephulac)  15 gm PO DAILY [facility]


   Stop: 11/15/18 15:59


   Last Admin: 18 18:29 Dose:  15 gm


Lorazepam (Ativan)  0.5 mg PO Q8HR PRN; Protocol


   PRN Reason: Anxiety


   Stop: 18 04:59


   Last Admin: 09/15/18 09:30 Dose:  0.5 mg


Magnesium Hydroxide (Milk Of Magnesia)  30 ml PO Q48HR [facility]


   Stop: 18 21:59


   Last Admin: 18 21:57 Dose:  Not Given


Mirtazapine (Remeron)  15 mg PO HS [facility]; Protocol


   Stop: 18 20:59


   Last Admin: 18 21:29 Dose:  15 mg


Miscellaneous (Probiotic Screen)  1 ea MC PRN PRN


   PRN Reason: PROTOCOL


   Stop: 18 11:44


Olanzapine (Zyprexa)  5 mg PO DAILY [facility]; Protocol


   Stop: 18 08:59


   Last Admin: 18 09:11 Dose:  5 mg


Olanzapine (Zyprexa)  10 mg PO HS [facility]; Protocol


   Stop: 18 20:59


   Last Admin: 18 21:29 Dose:  10 mg


Ondansetron HCl (Zofran Odt)  4 mg PO Q6HR PRN


   PRN Reason: Nausea / Vomiting


   Stop: 11/10/18 00:44


   Last Admin: 18 01:21 Dose:  4 mg


Oxybutynin Chloride (Ditropan)  5 mg PO HS [facility]


   Stop: 11/10/18 20:59


   Last Admin: 18 21:29 Dose:  5 mg


Pantoprazole Sodium (Protonix)  40 mg PO DAILY [facility]


   Stop: 11/10/18 08:59


   Last Admin: 18 09:11 Dose:  40 mg


Sodium Chloride (Nacl Tab)  1 gm PO BID [facility]


   Stop: 11/10/18 08:59


   Last Admin: 18 18:30 Dose:  1 gm


Tamsulosin HCl (Flomax)  0.4 mg PO DAILY [facility]


   Stop: 11/10/18 08:59


   Last Admin: 18 09:10 Dose:  0.4 mg








General: weak, congested, demented


HEENT: NC/AT, PERRLA


Neck: Supple, No JVD, No thyromegaly, No LAD


Lungs: wheezing, ronchi


Cardiovascular: RRR, Normal S1


Abdomen: non-tender, distended


Extremities: clear


Neurological: no change





Internal Medicine Assmt/Plan





- Assessment


Assessment: 


Hypotension: observe closely and may need to adjust BP med.





Noncompliance: education provided.





ALOC: on and off; observe.





Ileus: continue laxatives.





Hypokalemia: supplement.





H/O A. Fib: rate controlled.





Hypotension: better.





Nutritional Asmnt/Malnutr-PDOC





- Dietary Evaluation


Malnutrition Findings (Please click <Entered> for more info): 








Nutritional Asmnt/Malnutrition                             Start:  18 17:

41


Text:                                                      Status: Complete    

  


Freq:                                                                          

  


Protocol:                                                                      

  


 Document     18 17:41  DEBORAJUAN DANIEL  (Rec: 18 17:57  DEBORAJUAN DANIEL [id number])


 Nutritional Asmnt/Malnutrition


     Patient General Information


      Nutritional Screening                      Moderate Risk


      Diagnosis                                  ileus


      Pertinent Medical Hx/Surgical Hx           COPD, PNA, CAD, s/p MI, afib,


                                                 CVA, BPH, sepsis, UTI,


                                                 psychosis bipolar


      Subjective Information                     Pt seen lying in bed, awake


                                                 and alert. Pt requested solid


                                                 food. Explained clear liquid


                                                 diet for pt. Pt was not


                                                 verbalized understanding. Per


                                                 MD note , pt has no abd


                                                 pain, had BM, refused golytely


      Current Diet Order/ Nutrition Support      clear liquid


      Pertinent Medications                      vit B12, colcae, culturelle,


                                                 mineral oil, remeron, protonix


                                                 , piperacillin, D5-0.45ns w/


                                                 20meq kcl, nacl tab


      Pertinent Labs                              K 3.0, Ca 8.4


     Nutritional Hx/Data


      Height                                     1.8 m


      Height (Calculated Centimeters)            180.3


      Current Weight (lbs)                       58.513 kg


      Weight (Calculated Kilograms)              58.5


      Weight (Calculated Grams)                  20819.4


      Ideal Body Weight                          172


      Body Mass Index (BMI)                      17.9


      Weight Status                              Underweight


     GI Symptoms


      GI Symptoms                                None


      Last BM                                    9/13 x2


      Difficult in:                              None


      Skin Integrity/Comment:                    intact


     Estimated Nutritional Goals


      BEE in Kcals:                              Using Current wt


      Calories/Kcals/Kg                          30-35


      Kcals Calculated                           1470-3564


      Protein:                                   Using Current wt


      Protein g/k.2


      Protein Calculated                         71


      Fluid: ml                                  1770-2065ml (1ml/kcal)


     Nutritional Problem


      1. Problem


       Problem                                   altered nutrition related labs


       Etiology                                  electrolytes imbalance


       Signs/Symptoms:                           K 2.9-3.0


     Intervention/Recommendation


      Comments                                   1. Advance diet as tolerated.


                                                 2. Monitor PO intake, wt, labs


                                                 and skin integrity


                                                 3. F/U as moderate risk in 3-5


                                                 days, -


     Expected Outcomes/Goals


      Expected Outcomes/Goals                    1. PO intake to meet at least


                                                 75% of nutritional needs.


                                                 2. Wt stability, skin to


                                                 remain intact, labs to


                                                 approach WNL.

## 2018-09-17 RX ADMIN — MAGNESIUM HYDROXIDE SCH: 400 SUSPENSION ORAL at 00:19

## 2018-09-17 RX ADMIN — PANTOPRAZOLE SODIUM SCH: 40 TABLET, DELAYED RELEASE ORAL at 10:01

## 2018-09-17 RX ADMIN — Medication SCH: at 10:01

## 2018-09-17 RX ADMIN — POTASSIUM CHLORIDE, DEXTROSE MONOHYDRATE AND SODIUM CHLORIDE SCH MLS/HR: 150; 5; 450 INJECTION, SOLUTION INTRAVENOUS at 05:36

## 2018-09-17 RX ADMIN — LACTULOSE SCH: 20 SOLUTION ORAL at 10:01

## 2018-09-17 RX ADMIN — POTASSIUM CHLORIDE, DEXTROSE MONOHYDRATE AND SODIUM CHLORIDE SCH MLS/HR: 150; 5; 450 INJECTION, SOLUTION INTRAVENOUS at 22:34

## 2018-09-17 NOTE — GI PROGRESS NOTE
Subjective





- Review of Systems


Service Date: 09/17/18


Events since last encounter: 





No major events





Objective





- Results


Result Diagrams: 


 09/15/18 06:43





 09/15/18 06:43


Recent Labs: 


 Laboratory Last Values











WBC  8.2 Th/cmm (4.8-10.8)   09/15/18  06:43    


 


RBC  3.77 Mil/cmm (3.80-5.80)  L  09/15/18  06:43    


 


Hgb  11.7 gm/dL (12-16)  L  09/15/18  06:43    


 


Hct  35.2 % (41.0-60)  L  09/15/18  06:43    


 


MCV  93.5 fl (80-99)   09/15/18  06:43    


 


MCH  31.0 pg (27.0-31.0)   09/15/18  06:43    


 


MCHC Differential  33.2 pg (28.0-36.0)   09/15/18  06:43    


 


RDW  14.6 % (11.5-20.0)   09/15/18  06:43    


 


Plt Count  205 Th/cmm (150-400)   09/15/18  06:43    


 


MPV  8.1 fl  09/15/18  06:43    


 


Add Manual Diff  YES   09/15/18  06:43    


 


Neutrophils %  73.9 % (40.0-80.0)   09/12/18  05:40    


 


Band Neutrophils %  3 % (0-10)   09/15/18  06:43    


 


Lymphocytes %  11.3 % (20.0-50.0)  L  09/12/18  05:40    


 


Monocytes %  13.9 % (2.0-10.0)  H  09/12/18  05:40    


 


Eosinophils %  0.7 % (0.0-5.0)   09/12/18  05:40    


 


Basophils %  0.2 % (0.0-2.0)   09/12/18  05:40    


 


Neutrophils (Manual)  57 % (40-80)   09/15/18  06:43    


 


Lymphocytes  20 % (20-50)   09/15/18  06:43    


 


Monocytes  20 % (2-10)  H  09/15/18  06:43    


 


Platelet Estimate  ADEQUATE  (NORMAL)   09/15/18  06:43    


 


Sodium  132 mEq/L (136-145)  L  09/15/18  06:43    


 


Potassium  3.6 mEq/L (3.5-5.1)   09/15/18  06:43    


 


Chloride  104 mEq/L ()   09/15/18  06:43    


 


Carbon Dioxide  23.9 mEq/L (21.0-31.0)   09/15/18  06:43    


 


Anion Gap  7.7  (7.0-16.0)   09/15/18  06:43    


 


BUN  7 mg/dL (7-25)   09/15/18  06:43    


 


Creatinine  0.6 mg/dL (0.7-1.3)  L  09/15/18  06:43    


 


Est GFR ( Amer)  > 60.0 ml/min (>90)   09/15/18  06:43    


 


Est GFR (Non-Af Amer)  > 60.0 ml/min  09/15/18  06:43    


 


BUN/Creatinine Ratio  11.7   09/15/18  06:43    


 


Glucose  116 mg/dL ()  H  09/15/18  06:43    


 


Whole Bld Lactic Acid  1.26 mmol/L (0.60-1.99)   09/10/18  05:10    


 


Calcium  8.1 mg/dL (8.6-10.3)  L  09/15/18  06:43    


 


Total Bilirubin  0.4 mg/dL (0.3-1.0)   09/15/18  06:43    


 


AST  10 U/L (13-39)  L  09/15/18  06:43    


 


ALT  10 U/L (7-52)   09/15/18  06:43    


 


Alkaline Phosphatase  67 U/L ()   09/15/18  06:43    


 


Ammonia  88 umol/L (16-53)  H  09/10/18  05:10    


 


Troponin I  < 0.01 ng/mL (0.01-0.05)  L  09/10/18  05:10    


 


Total Protein  5.5 gm/dL (6.0-8.3)  L  09/15/18  06:43    


 


Albumin  2.5 gm/dL (4.2-5.5)  L  09/15/18  06:43    


 


Globulin  3.0 gm/dL  09/15/18  06:43    


 


Albumin/Globulin Ratio  0.8  (1.0-1.8)  L  09/15/18  06:43    


 


Urine Source  CLEAN C   09/11/18  06:57    


 


Urine Color  YELLOW   09/11/18  06:57    


 


Urine Clarity  CLEAR  (CLEAR)   09/11/18  06:57    


 


Urine pH  6.0  (4.6 - 8.0)   09/11/18  06:57    


 


Ur Specific Gravity  1.025  (1.005-1.030)   09/11/18  06:57    


 


Urine Protein  TRACE mg/dL (NEGATIVE)   09/11/18  06:57    


 


Urine Glucose (UA)  NEGATIVE mg/dL (NEGATIVE)   09/11/18  06:57    


 


Urine Ketones  TRACE mg/dL (NEGATIVE)   09/11/18  06:57    


 


Urine Blood  NEGATIVE  (NEGATIVE)   09/11/18  06:57    


 


Urine Nitrate  NEGATIVE  (NEGATIVE)   09/11/18  06:57    


 


Urine Bilirubin  NEGATIVE  (NEGATIVE)   09/11/18  06:57    


 


Urine Urobilinogen  1.0 E.U./dL (0.2 - 1.0)   09/11/18  06:57    


 


Ur Leukocyte Esterase  NEGATIVE  (NEGATIVE)   09/11/18  06:57    


 


Urine RBC  0-2 /hpf (0-5)  H  09/11/18  06:57    


 


Urine WBC  0-2 /hpf (0-5)   09/11/18  06:57    


 


Ur Epithelial Cells  OCCASIONAL /lpf (FEW)   09/11/18  06:57    


 


Uric Acid Crystals  FEW /hpf (NONE SEEN)   09/11/18  06:57    


 


Urine Bacteria  NONE SEEN /hpf (NONE SEEN)   09/11/18  06:57    














- Physical Exam


Vitals and I&O: 


 Vital Signs











Temp  100.1 F   09/17/18 20:00


 


Pulse  99   09/17/18 20:00


 


Resp  20   09/17/18 20:00


 


BP  101/72   09/17/18 20:00


 


Pulse Ox  94   09/17/18 20:00








 Intake & Output











 09/17/18 09/17/18 09/18/18





 06:59 18:59 06:59


 


Intake Total 1694.5 1000 


 


Balance 1694.5 1000 


 


Weight (lbs) 59.421 kg 59.421 kg 


 


Intake:   


 


  Intake, IV Amount 1094.5 50 


 


    D5-0.45NS w/20 mEq KCL 1, 994.5  





    000 ml @ 90 mls/hr IV .   





    Q11H7M [facility] Rx#:[id number]   


 


    Piperacillin Sodium/ 100 50 





    Tazobact 3.375 gm In   





    Sodium Chloride 0.9% 50   





    ml @ 100 mls/hr IV Q8H   





    [facility] Rx#:[id number]   


 


  Oral 600 950 


 


Other:   


 


  # Voids 3 3 


 


  # Bowel Movements  1 


 


  Weight Source Bedscale Bedscale 











Active Medications: 


Current Medications





Amiodarone HCl (Cordarone)  200 mg PO DAILY MADDY


   Stop: 11/10/18 08:59


   Last Admin: 09/17/18 10:00 Dose:  Not Given


Bisacodyl (Dulcolax 5 Mg Ec Tab)  20 mg PO BID MADDY


   Stop: 11/15/18 16:59


   Last Admin: 09/17/18 16:38 Dose:  20 mg


Cyanocobalamin (Vitamin B12)  500 mcg PO DAILY MADDY


   Stop: 11/10/18 08:59


   Last Admin: 09/17/18 10:01 Dose:  Not Given


Docusate Sodium (Colace)  250 mg PO HS MADDY


   Stop: 11/10/18 20:59


   Last Admin: 09/17/18 00:21 Dose:  Not Given


Gabapentin (Neurontin)  300 mg PO TID MADDY


   Stop: 11/10/18 08:59


   Last Admin: 09/17/18 14:56 Dose:  Not Given


Potassium Chloride/Dextrose/Sod Cl (D5-0.45ns W/20 Meq Kcl)  1,000 mls @ 90 mls/

hr IV .Q11H7M MADDY


   Stop: 11/09/18 09:06


   Last Admin: 09/17/18 05:36 Dose:  90 mls/hr


Piperacillin Sod/Tazobactam (Sod 3.375 gm/ Sodium Chloride)  50 mls @ 100 mls/

hr IV Q8H MADDY


   Stop: 11/09/18 16:59


   Last Infusion: 09/17/18 17:08 Dose:  Infused


Lactobacillus Rhamnosus (Culturelle 15b)  1 each PO DAILY MADDY


   Stop: 11/10/18 08:59


   Last Admin: 09/17/18 10:01 Dose:  Not Given


Lactulose (Cephulac)  15 gm PO DAILY MADDY


   Stop: 11/15/18 15:59


   Last Admin: 09/17/18 10:01 Dose:  Not Given


Lorazepam (Ativan)  0.5 mg PO Q8HR PRN; Protocol


   PRN Reason: Anxiety


   Stop: 11/13/18 04:59


   Last Admin: 09/15/18 09:30 Dose:  0.5 mg


Magnesium Hydroxide (Milk Of Magnesia)  30 ml PO Q48HR MADDY


   Stop: 11/09/18 21:59


   Last Admin: 09/17/18 00:19 Dose:  Not Given


Mirtazapine (Remeron)  15 mg PO HS MADDY; Protocol


   Stop: 11/13/18 20:59


   Last Admin: 09/17/18 00:21 Dose:  Not Given


Miscellaneous (Probiotic Screen)  1 ea MC PRN PRN


   PRN Reason: PROTOCOL


   Stop: 11/12/18 11:44


Olanzapine (Zyprexa)  5 mg PO DAILY MADDY; Protocol


   Stop: 11/13/18 08:59


   Last Admin: 09/17/18 10:01 Dose:  Not Given


Olanzapine (Zyprexa)  10 mg PO HS MADDY; Protocol


   Stop: 11/13/18 20:59


   Last Admin: 09/17/18 00:21 Dose:  Not Given


Ondansetron HCl (Zofran Odt)  4 mg PO Q6HR PRN


   PRN Reason: Nausea / Vomiting


   Stop: 11/10/18 00:44


   Last Admin: 09/14/18 01:21 Dose:  4 mg


Oxybutynin Chloride (Ditropan)  5 mg PO HS MADDY


   Stop: 11/10/18 20:59


   Last Admin: 09/16/18 21:29 Dose:  5 mg


Pantoprazole Sodium (Protonix)  40 mg PO DAILY MADDY


   Stop: 11/10/18 08:59


   Last Admin: 09/17/18 10:01 Dose:  Not Given


Sodium Chloride (Nacl Tab)  1 gm PO BID MADDY


   Stop: 11/10/18 08:59


   Last Admin: 09/17/18 16:38 Dose:  1 gm


Tamsulosin HCl (Flomax)  0.4 mg PO DAILY MADDY


   Stop: 11/10/18 08:59


   Last Admin: 09/17/18 10:02 Dose:  Not Given








General: Alert


HEENT: Atraumatic


Neck: Supple


Cardiovascular: Regular rate, Normal S1, Normal S2, Gallops


Lungs: Clear to auscultation, Normal air movement


Abdomen: Bowel sounds





Assessment/Plan





- Assessment


Assessment: 








67 YO MALE WITH ABD DISTENSION


LIKELY FECAL IMPACTION NOW IMPROVED 


HAVING BM





1.CONT LAXATIVES


2.PT REFUSING COLOnoscopy


3.WILL NEED BARIUM ENEMA WHEN CLEANED OUT AS AN ALTERNATIVE - he was refusing 

this today as well





Overall, noncompliant gentleman and does not want any intervention.


Please call GI if patient does change his mind, or wants more aggressive workup





Thank you for allowing us to participate in the care

## 2018-09-18 RX ADMIN — PANTOPRAZOLE SODIUM SCH MG: 40 TABLET, DELAYED RELEASE ORAL at 09:14

## 2018-09-18 RX ADMIN — LACTULOSE SCH GM: 20 SOLUTION ORAL at 09:03

## 2018-09-18 RX ADMIN — Medication SCH EACH: at 09:04

## 2018-09-18 RX ADMIN — POTASSIUM CHLORIDE, DEXTROSE MONOHYDRATE AND SODIUM CHLORIDE SCH MLS/HR: 150; 5; 450 INJECTION, SOLUTION INTRAVENOUS at 06:36

## 2018-09-18 NOTE — INTERNAL MEDICINE PROG NOTE
Internal Medicine Subjective





- Subjective


Service Date: 18


Patient seen and examined:: without staff


Patient is:: asleep, non-interactive, in bed


Per staff patient has:: no adverse event





Internal Medicine Objective





- Results


Result Diagrams: 


 09/15/18 06:43





 09/15/18 06:43


Recent Labs: 


 Laboratory Last Values











WBC  8.2 Th/cmm (4.8-10.8)   09/15/18  06:43    


 


RBC  3.77 Mil/cmm (3.80-5.80)  L  09/15/18  06:43    


 


Hgb  11.7 gm/dL (12-16)  L  09/15/18  06:43    


 


Hct  35.2 % (41.0-60)  L  09/15/18  06:43    


 


MCV  93.5 fl (80-99)   09/15/18  06:43    


 


MCH  31.0 pg (27.0-31.0)   09/15/18  06:43    


 


MCHC Differential  33.2 pg (28.0-36.0)   09/15/18  06:43    


 


RDW  14.6 % (11.5-20.0)   09/15/18  06:43    


 


Plt Count  205 Th/cmm (150-400)   09/15/18  06:43    


 


MPV  8.1 fl  09/15/18  06:43    


 


Add Manual Diff  YES   09/15/18  06:43    


 


Neutrophils %  73.9 % (40.0-80.0)   18  05:40    


 


Band Neutrophils %  3 % (0-10)   09/15/18  06:43    


 


Lymphocytes %  11.3 % (20.0-50.0)  L  18  05:40    


 


Monocytes %  13.9 % (2.0-10.0)  H  18  05:40    


 


Eosinophils %  0.7 % (0.0-5.0)   18  05:40    


 


Basophils %  0.2 % (0.0-2.0)   18  05:40    


 


Neutrophils (Manual)  57 % (40-80)   09/15/18  06:43    


 


Lymphocytes  20 % (20-50)   09/15/18  06:43    


 


Monocytes  20 % (2-10)  H  09/15/18  06:43    


 


Platelet Estimate  ADEQUATE  (NORMAL)   09/15/18  06:43    


 


Sodium  132 mEq/L (136-145)  L  09/15/18  06:43    


 


Potassium  3.6 mEq/L (3.5-5.1)   09/15/18  06:43    


 


Chloride  104 mEq/L ()   09/15/18  06:43    


 


Carbon Dioxide  23.9 mEq/L (21.0-31.0)   09/15/18  06:43    


 


Anion Gap  7.7  (7.0-16.0)   09/15/18  06:43    


 


BUN  7 mg/dL (7-25)   09/15/18  06:43    


 


Creatinine  0.6 mg/dL (0.7-1.3)  L  09/15/18  06:43    


 


Est GFR ( Amer)  > 60.0 ml/min (>90)   09/15/18  06:43    


 


Est GFR (Non-Af Amer)  > 60.0 ml/min  09/15/18  06:43    


 


BUN/Creatinine Ratio  11.7   09/15/18  06:43    


 


Glucose  116 mg/dL ()  H  09/15/18  06:43    


 


Whole Bld Lactic Acid  1.26 mmol/L (0.60-1.99)   09/10/18  05:10    


 


Calcium  8.1 mg/dL (8.6-10.3)  L  09/15/18  06:43    


 


Total Bilirubin  0.4 mg/dL (0.3-1.0)   09/15/18  06:43    


 


AST  10 U/L (13-39)  L  09/15/18  06:43    


 


ALT  10 U/L (7-52)   09/15/18  06:43    


 


Alkaline Phosphatase  67 U/L ()   09/15/18  06:43    


 


Ammonia  88 umol/L (16-53)  H  09/10/18  05:10    


 


Troponin I  < 0.01 ng/mL (0.01-0.05)  L  09/10/18  05:10    


 


Total Protein  5.5 gm/dL (6.0-8.3)  L  09/15/18  06:43    


 


Albumin  2.5 gm/dL (4.2-5.5)  L  09/15/18  06:43    


 


Globulin  3.0 gm/dL  09/15/18  06:43    


 


Albumin/Globulin Ratio  0.8  (1.0-1.8)  L  09/15/18  06:43    


 


Urine Source  CLEAN C   18  06:57    


 


Urine Color  YELLOW   18  06:57    


 


Urine Clarity  CLEAR  (CLEAR)   18  06:57    


 


Urine pH  6.0  (4.6 - 8.0)   18  06:57    


 


Ur Specific Gravity  1.025  (1.005-1.030)   18  06:57    


 


Urine Protein  TRACE mg/dL (NEGATIVE)   18  06:57    


 


Urine Glucose (UA)  NEGATIVE mg/dL (NEGATIVE)   18  06:57    


 


Urine Ketones  TRACE mg/dL (NEGATIVE)   18  06:57    


 


Urine Blood  NEGATIVE  (NEGATIVE)   18  06:57    


 


Urine Nitrate  NEGATIVE  (NEGATIVE)   18  06:57    


 


Urine Bilirubin  NEGATIVE  (NEGATIVE)   18  06:57    


 


Urine Urobilinogen  1.0 E.U./dL (0.2 - 1.0)   18  06:57    


 


Ur Leukocyte Esterase  NEGATIVE  (NEGATIVE)   18  06:57    


 


Urine RBC  0-2 /hpf (0-5)  H  18  06:57    


 


Urine WBC  0-2 /hpf (0-5)   18  06:57    


 


Ur Epithelial Cells  OCCASIONAL /lpf (FEW)   18  06:57    


 


Uric Acid Crystals  FEW /hpf (NONE SEEN)   18  06:57    


 


Urine Bacteria  NONE SEEN /hpf (NONE SEEN)   18  06:57    














- Physical Exam


Vitals and I&O: 


 Vital Signs











Temp  98.9 F   18 12:00


 


Pulse  78   18 12:00


 


Resp  19   18 12:00


 


BP  104/69   18 12:00


 


Pulse Ox  98   18 12:00








 Intake & Output











 18





 18:59 06:59 18:59


 


Intake Total  773 50


 


Balance  773 50


 


Weight (lbs) 59.421 kg  


 


Intake:   


 


  Intake, IV Amount 1050 773 50


 


    D5-0.45NS w/20 mEq KCL 1, 1000 723 





    000 ml @ 90 mls/hr IV .   





    Q11H7M [facility] Rx#:[id number]   


 


    Piperacillin Sodium/ 50 50 50





    Tazobact 3.375 gm In   





    Sodium Chloride 0.9% 50   





    ml @ 100 mls/hr IV Q8H   





    [facility] Rx#:[id number]   


 


  Oral 950  


 


Other:   


 


  # Voids 3  


 


  # Bowel Movements 1  


 


  Weight Source Bedscale  











Active Medications: 


Current Medications





Amiodarone HCl (Cordarone)  200 mg PO DAILY MADDY


   Stop: 11/10/18 08:59


   Last Admin: 18 09:04 Dose:  200 mg


Bisacodyl (Dulcolax 5 Mg Ec Tab)  20 mg PO BID MADDY


   Stop: 11/15/18 16:59


   Last Admin: 18 09:03 Dose:  20 mg


Cyanocobalamin (Vitamin B12)  500 mcg PO DAILY MADDY


   Stop: 11/10/18 08:59


   Last Admin: 18 09:03 Dose:  500 mcg


Docusate Sodium (Colace)  250 mg PO HS MADDY


   Stop: 11/10/18 20:59


   Last Admin: 18 21:23 Dose:  250 mg


Gabapentin (Neurontin)  300 mg PO TID MADDY


   Stop: 11/10/18 08:59


   Last Admin: 18 09:04 Dose:  300 mg


Potassium Chloride/Dextrose/Sod Cl (D5-0.45ns W/20 Meq Kcl)  1,000 mls @ 90 mls/

hr IV .Q11H7M MADDY


   Stop: 18 09:06


   Last Admin: 18 06:36 Dose:  90 mls/hr


Piperacillin Sod/Tazobactam (Sod 3.375 gm/ Sodium Chloride)  50 mls @ 100 mls/

hr IV Q8H MADDY


   Stop: 18 16:59


   Last Infusion: 18 09:34 Dose:  Infused


Lactobacillus Rhamnosus (Culturelle 15b)  1 each PO DAILY MADDY


   Stop: 11/10/18 08:59


   Last Admin: 18 09:04 Dose:  1 each


Lactulose (Cephulac)  15 gm PO DAILY MADDY


   Stop: 11/15/18 15:59


   Last Admin: 18 09:03 Dose:  15 gm


Lorazepam (Ativan)  0.5 mg PO Q8HR PRN; Protocol


   PRN Reason: Anxiety


   Stop: 18 04:59


   Last Admin: 18 21:21 Dose:  0.5 mg


Magnesium Hydroxide (Milk Of Magnesia)  30 ml PO Q48HR [facility]


   Stop: 18 21:59


   Last Admin: 18 00:19 Dose:  Not Given


Mirtazapine (Remeron)  15 mg PO HS MADDY; Protocol


   Stop: 18 20:59


   Last Admin: 18 21:21 Dose:  15 mg


Miscellaneous (Probiotic Screen)  1 ea MC PRN PRN


   PRN Reason: PROTOCOL


   Stop: 18 11:44


Olanzapine (Zyprexa)  5 mg PO DAILY [facility]; Protocol


   Stop: 18 08:59


   Last Admin: 18 09:03 Dose:  5 mg


Olanzapine (Zyprexa)  10 mg PO HS [facility]; Protocol


   Stop: 18 20:59


   Last Admin: 18 21:23 Dose:  10 mg


Ondansetron HCl (Zofran Odt)  4 mg PO Q6HR PRN


   PRN Reason: Nausea / Vomiting


   Stop: 11/10/18 00:44


   Last Admin: 18 01:21 Dose:  4 mg


Oxybutynin Chloride (Ditropan)  5 mg PO HS [facility]


   Stop: 11/10/18 20:59


   Last Admin: 18 21:23 Dose:  5 mg


Pantoprazole Sodium (Protonix)  40 mg PO DAILY [facility]


   Stop: 11/10/18 08:59


   Last Admin: 18 09:14 Dose:  40 mg


Sodium Chloride (Nacl Tab)  1 gm PO BID [facility]


   Stop: 11/10/18 08:59


   Last Admin: 18 09:04 Dose:  1 gm


Tamsulosin HCl (Flomax)  0.4 mg PO DAILY [facility]


   Stop: 11/10/18 08:59


   Last Admin: 18 09:04 Dose:  0.4 mg








General: weak, congested, demented


HEENT: NC/AT, PERRLA


Neck: Supple, No JVD, No thyromegaly, No LAD


Lungs: wheezing, ronchi


Cardiovascular: RRR, Normal S1


Abdomen: non-tender, distended


Extremities: clear


Neurological: no change





Internal Medicine Assmt/Plan





- Assessment


Assessment: 


Noncompliance: education provided.





Hypotension: observe closely and may need to adjust BP med.





ALOC: on and off; observe.





Ileus: continue laxatives.





Hypokalemia: supplement.





H/O A. Fib: rate controlled.





Hypotension: better.





Nutritional Asmnt/Malnutr-PDOC





- Dietary Evaluation


Malnutrition Findings (Please click <Entered> for more info): 








Nutritional Asmnt/Malnutrition                             Start:  18 17:

41


Text:                                                      Status: Complete    

  


Freq:                                                                          

  


Protocol:                                                                      

  


 Document     18 17:41  LCLINDAG  (Rec: 18 17:57  LINDAG  [id number])


 Nutritional Asmnt/Malnutrition


     Patient General Information


      Nutritional Screening                      Moderate Risk


      Diagnosis                                  ileus


      Pertinent Medical Hx/Surgical Hx           COPD, PNA, CAD, s/p MI, afib,


                                                 CVA, BPH, sepsis, UTI,


                                                 psychosis bipolar


      Subjective Information                     Pt seen lying in bed, awake


                                                 and alert. Pt requested solid


                                                 food. Explained clear liquid


                                                 diet for pt. Pt was not


                                                 verbalized understanding. Per


                                                 MD note , pt has no abd


                                                 pain, had BM, refused golytely


      Current Diet Order/ Nutrition Support      clear liquid


      Pertinent Medications                      vit B12, colcae, culturelle,


                                                 mineral oil, remeron, protonix


                                                 , piperacillin, D5-0.45ns w/


                                                 20meq kcl, nacl tab


      Pertinent Labs                              K 3.0, Ca 8.4


     Nutritional Hx/Data


      Height                                     1.8 m


      Height (Calculated Centimeters)            180.3


      Current Weight (lbs)                       58.513 kg


      Weight (Calculated Kilograms)              58.5


      Weight (Calculated Grams)                  15193.4


      Ideal Body Weight                          172


      Body Mass Index (BMI)                      17.9


      Weight Status                              Underweight


     GI Symptoms


      GI Symptoms                                None


      Last BM                                    9/13 x2


      Difficult in:                              None


      Skin Integrity/Comment:                    intact


     Estimated Nutritional Goals


      BEE in Kcals:                              Using Current wt


      Calories/Kcals/Kg                          30-35


      Kcals Calculated                           2172-2540


      Protein:                                   Using Current wt


      Protein g/k.2


      Protein Calculated                         71


      Fluid: ml                                  1770-2065ml (1ml/kcal)


     Nutritional Problem


      1. Problem


       Problem                                   altered nutrition related labs


       Etiology                                  electrolytes imbalance


       Signs/Symptoms:                           K 2.9-3.0


     Intervention/Recommendation


      Comments                                   1. Advance diet as tolerated.


                                                 2. Monitor PO intake, wt, labs


                                                 and skin integrity


                                                 3. F/U as moderate risk in 3-5


                                                 days, -


     Expected Outcomes/Goals


      Expected Outcomes/Goals                    1. PO intake to meet at least


                                                 75% of nutritional needs.


                                                 2. Wt stability, skin to


                                                 remain intact, labs to


                                                 approach WNL.

## 2018-09-24 NOTE — DISCHARGE SUMMARY
DATE OF DISCHARGE:  09/18/2018



FINAL DIAGNOSES:

1.  Abdominal pain and vomiting significantly improved and basically resolved.

2.  Ileus, improved.

3.  Noncompliance with education provided.

4.  Hypokalemia, supplemented.

5.  Hypotension, resolved.

6.  Dehydration, received IV fluid.



HISTORY AND HOSPITAL COURSE:  The patient is a 68-year-old -American male

admitted due to nausea, vomiting, and abdominal pain.  The KUB revealed stool

filled dilated large bowel and the findings may be associated with fecal

impaction.  Abdominal and pelvic CT scan done in the Emergency Room revealed

possible ileus.  GI consultation was requested and the patient was kept n.p.o.,

but the patient refused any GI procedures.  Laxative was provided to the

patient.  IV fluid was of course provided and empiric antibiotics were started

as well.  The patient's condition improved significantly clinically by

conservative management.  He was eventually accepted back to nursing home.



DISCHARGE CONDITION:  Stable.



DISPOSITION:  [facility].



DISCHARGE MEDICATIONS:  Continue medication from here.



DIET:  Cardiac, soft diet.



ACTIVITY:  As tolerated with physical therapy.



FOLLOWUP:  In 1 week.





DD: 09/24/2018 08:15

DT: 09/24/2018 12:20

JOB# [id number]  [id number]

## 2023-03-06 NOTE — INTERNAL MEDICINE PROG NOTE
Internal Medicine Subjective





- Subjective


Patient is:: awake, non-interactive, in bed, agitated


Patient Complaints of:: unable to sleep


Per staff patient has:: no adverse event





Internal Medicine Objective





- Results


Result Diagrams: 


 18 05:45





 18 05:45


Recent Labs: 


 Laboratory Last Values











WBC  7.5 Th/cmm (4.8-10.8)   18  05:45    


 


RBC  3.86 Mil/cmm (3.80-5.80)   18  05:45    


 


Hgb  12.3 gm/dL (12-16)   18  05:45    


 


Hct  36.1 % (41.0-60)  L  18  05:45    


 


MCV  93.7 fl (80-99)   18  05:45    


 


MCH  31.8 pg (27.0-31.0)  H  18  05:45    


 


MCHC Differential  34.0 pg (28.0-36.0)   18  05:45    


 


RDW  14.9 % (11.5-20.0)   18  05:45    


 


Plt Count  235 Th/cmm (150-400)   18  05:45    


 


MPV  7.7 fl  18  05:45    


 


Neutrophils %  72.5 % (40.0-80.0)   18  05:45    


 


Lymphocytes %  14.5 % (20.0-50.0)  L  18  05:45    


 


Monocytes %  12.3 % (2.0-10.0)  H  18  05:45    


 


Eosinophils %  0.4 % (0.0-5.0)   18  05:45    


 


Basophils %  0.3 % (0.0-2.0)   18  05:45    


 


Sodium  139 mEq/L (136-145)   18  05:45    


 


Potassium  3.9 mEq/L (3.5-5.1)   18  05:45    


 


Chloride  104 mEq/L ()   18  05:45    


 


Carbon Dioxide  23.4 mEq/L (21.0-31.0)   18  05:45    


 


Anion Gap  15.5  (7.0-16.0)   18  05:45    


 


BUN  23 mg/dL (7-25)   18  05:45    


 


Creatinine  1.0 mg/dL (0.7-1.3)   18  05:45    


 


Est GFR ( Amer)  > 60.0 ml/min (>90)   18  05:45    


 


Est GFR (Non-Af Amer)  > 60.0 ml/min  18  05:45    


 


BUN/Creatinine Ratio  23.0   18  05:45    


 


Glucose  78 mg/dL ()   18  05:45    


 


Calcium  9.2 mg/dL (8.6-10.3)   18  05:45    


 


Valproic Acid  60.7 ug/mL (50.0-100.0)   18  20:52    














- Physical Exam


Vitals and I&O: 


 Vital Signs











Temp  97.6 F   18 14:00


 


Pulse  86   18 14:00


 


Resp  20   18 14:00


 


BP  96/56   18 14:00


 


Pulse Ox  96   18 14:00








 Intake & Output











 08/09/18 08/09/18 08/10/18





 06:59 18:59 06:59


 


Intake Total 240 960 


 


Balance 240 960 


 


Intake:   


 


  Oral 240 960 


 


Other:   


 


  # Voids 3 3 


 


  # Bowel Movements 0 1 











Active Medications: 


Current Medications





Acetaminophen (Tylenol)  650 mg PO Q4HR PRN


   PRN Reason: Mild Pain / Temp above 100


   Stop: 18 01:46


   Last Admin: 18 16:59 Dose:  650 mg


Al Hydrox/Mg Hydrox/Simethicone (Maalox)  30 ml PO Q4HR PRN


   PRN Reason: GI DISTRESS


   Stop: 18 01:46


Albuterol/Ipratropium (Duoneb Neb)  3 ml HHN Q2HRT PRN


   PRN Reason: Wheezing


   Stop: 18 08:00


Amiodarone HCl (Cordarone)  200 mg PO DAILY [facility]


   Stop: 18 08:59


   Last Admin: 18 08:59 Dose:  200 mg


Cyanocobalamin (Vitamin B12)  1,000 mcg PO DAILY [facility]


   Stop: 18 08:59


   Last Admin: 18 08:58 Dose:  1,000 mcg


Diltiazem HCl (Cardizem)  60 mg PO DAILY [facility]


   Stop: 18 08:59


   Last Admin: 18 08:59 Dose:  60 mg


Divalproex Sodium (Depakote Dr)  500 mg PO BID [facility]; Protocol


   Stop: 18 08:59


   Last Admin: 18 17:00 Dose:  500 mg


Docusate Sodium (Colace)  100 mg PO BID PRN


   PRN Reason: CONSTIPATION


   Stop: 18 08:00


   Last Admin: 18 21:10 Dose:  100 mg


Ferrous Sulfate (Iron)  325 mg PO DAILY [facility]


   Stop: 18 08:59


   Last Admin: 18 08:58 Dose:  325 mg


Gabapentin (Neurontin)  300 mg PO TID [facility]


   Stop: 18 08:59


   Last Admin: 18 21:28 Dose:  300 mg


Magnesium Hydroxide (Milk Of Magnesia)  30 ml PO HS PRN


   PRN Reason: Constipation


Mirtazapine (Remeron)  15 mg PO HS [facility]; Protocol


   Stop: 18 20:59


   Last Admin: 18 21:28 Dose:  15 mg


Multivitamins/Vitamin C (Theragran)  1 tab PO DAILY [facility]


   Stop: 18 08:59


   Last Admin: 18 08:58 Dose:  1 tab


Ondansetron HCl (Zofran Odt)  4 mg PO Q6H PRN


   PRN Reason: Nausea / Vomiting


   Stop: 18 05:59


   Last Admin: 18 14:44 Dose:  4 mg


Oxybutynin Chloride (Ditropan)  5 mg PO HS [facility]


   Stop: 18 20:59


   Last Admin: 18 21:28 Dose:  5 mg


Pantoprazole Sodium (Protonix)  40 mg PO BID [facility]


   Stop: 18 06:59


   Last Admin: 18 16:59 Dose:  40 mg


Quetiapine Fumarate (Seroquel)  200 mg PO HS [facility]; Protocol


   Stop: 10/03/18 20:59


   Last Admin: 18 21:28 Dose:  200 mg


Quetiapine Fumarate (Seroquel)  200 mg PO BID [facility]; Protocol


   Stop: 10/03/18 16:59


   Last Admin: 18 16:58 Dose:  200 mg


Sodium Chloride (Nacl Tab)  1 gm PO BID [facility]


   Stop: 18 08:59


   Last Admin: 18 16:59 Dose:  1 gm


Tamsulosin HCl (Flomax)  0.4 mg PO DAILY [facility]


   Stop: 18 08:59


   Last Admin: 18 08:58 Dose:  0.4 mg








General: weak, lethargic, congested, demented


HEENT: NC/AT, PERRLA, EOMI, anicteric sclerae, throat clear, thinning hair


Neck: Supple, No JVD, No LAD


Lungs: wheezing, ronchi


Cardiovascular: RRR, Normal S1


Abdomen: soft, non-tender, non-distended


Extremities: clear


Neurological: no change





Internal Medicine Assmt/Plan





- Assessment


Assessment: 


ALOC: on and off; observe closely.





Insomnia: on and off. Ambien PRN.





Noncompliance: education provided.





PNA: improving? pt refuses IVPB ABX.





H/O A. Fib: rate controlled.





Acute Psychosis: follow recommendation by Psychiatrist.





Agitation: sitter PRN





Leukocytosis: resolving?





s/p recent UGIB.





Nutritional Asmnt/Malnutr-PDOC





- Dietary Evaluation


Malnutrition Findings (Please click <Entered> for more info): 








Nutritional Asmnt/Malnutrition                             Start:  18 13:

17


Text:                                                      Status: Complete    

  


Freq:                                                                          

  


Protocol:                                                                      

  


 Document     18 13:17  LCHENG  (Rec: 18 13:22  [email or web address]  [id number])


 Nutritional Asmnt/Malnutrition


     Patient General Information


      Nutritional Screening                      Moderate Risk


      Diagnosis                                  psychosis


      Pertinent Medical Hx/Surgical Hx           CAD, s/p MI, afib, anemia, BPH


                                                 , COPD, PNA, psychosis,


                                                 anxiety, depression


      Subjective Information                     Pt seen sleeping in bed at


                                                 time of visit. Per EMR, PO


                                                 intake 100% of meals. Per


                                                 nurse note, pt had episode of


                                                 vomiting x 1 last night.


      Current Diet Order/ Nutrition Support      [facility] soft chopped bland


      Pertinent Medications                      vit B12, colace, iron, remeron


                                                 , theragran, protonix, nacl


                                                 tab, seroquel


      Pertinent Labs                              nutritin labs WNL


     Nutritional Hx/Data


      Height                                     1.75 m


      Height (Calculated Centimeters)            175.3


      Current Weight (lbs)                       61.235 kg


      Weight (Calculated Kilograms)              61.2


      Weight (Calculated Grams)                  36995.0


      Ideal Body Weight                          160


      Body Mass Index (BMI)                      19.9


      Weight Status                              Approriate


     GI Symptoms


      GI Symptoms                                None


      Last BM                                    


      Difficult in:                              None


      Skin Integrity/Comment:                    intact


      Current %PO                                Good (%)


     Estimated Nutritional Goals


      BEE in Kcals:                              Using Current wt


      Calories/Kcals/Kg                          25-30


      Kcals Calculated                           6955-2321


      Protein:                                   Using Current wt


      Protein g/k


      Protein Calculated                         61


      Fluid: ml                                  1525-1830ml (1ml/kcal)


     Nutritional Problem


      No current Nutrition Prob


       Problem                                   N/A


     Malnutrition Alert


      Is there a minimum of two criteria         No


       selected?                                 


       Query Text:Check all the applicable       


       criteria. A minimum of two criteria are   


       recommended for diagnosis of either       


       severe or non-severe malnutrition.        


     Malnutrition Related to Morbid Obesity


      Malnutrition related to morbid obesity     No


     Intervention/Recommendation


      Comments                                   1. Continue with [place] soft


                                                 chopped bland diet as ordered.


                                                 2. Monitor PO intake, wt, labs


                                                 and skin integrity


                                                 3. F/U as low risk in 7 days,


                                                 


     Expected Outcomes/Goals


      Expected Outcomes/Goals                    1. PO intake to meet at least


                                                 75% of nutritional needs.


                                                 2. Wt stability, skin to


                                                 remain intact, labs WNL. 10 (severe pain)

## 2023-08-13 NOTE — PROGRESS NOTES
DATE:  07/28/2018



Covering for Dr. Conner.



SUBJECTIVE:  Case was discussed with staff of the patient, reviewed records. 

This is a 68-year-old male, who was Dr. Conner's consult because of agitation,

paranoia with long history of what seems to be schizoaffective disorder.  The

patient has been confused, has GI bleed.  The patient has been extremely

agitated and irritable.  He has been aggressive.  Dr. Conner had to give Haldol,

Ativan, and Benadryl because of his agitation, it did calm him down, sometimes

aggressive with the staff.  He is on Zyprexa in the morning.  He continues to be

unpredictable and impulsive.  He has a history of schizoaffective disorder and

Dr. Conner has increased Zyprexa to 5 mg in the morning and 10 mg at bedtime,

which seems to have helped the patient a little bit.  He has no side effects

with the medication, no sedation, no nausea, and no extrapyramidal symptoms.



Thank you very much for allowing me to participate in the care of this most

interesting gentleman.





DD: 07/28/2018 14:00

DT: 07/28/2018 23:59

JOB# [id number]  [id number]